# Patient Record
Sex: FEMALE | Race: WHITE | NOT HISPANIC OR LATINO | Employment: OTHER | URBAN - METROPOLITAN AREA
[De-identification: names, ages, dates, MRNs, and addresses within clinical notes are randomized per-mention and may not be internally consistent; named-entity substitution may affect disease eponyms.]

---

## 2023-01-01 ENCOUNTER — APPOINTMENT (OUTPATIENT)
Dept: CARDIOLOGY | Facility: HOSPITAL | Age: 88
DRG: 233 | End: 2023-01-01
Payer: MEDICARE

## 2023-01-01 ENCOUNTER — APPOINTMENT (OUTPATIENT)
Dept: GENERAL RADIOLOGY | Facility: HOSPITAL | Age: 88
DRG: 233 | End: 2023-01-01
Payer: MEDICARE

## 2023-01-01 ENCOUNTER — OFFICE VISIT (OUTPATIENT)
Dept: PERIOP | Facility: HOSPITAL | Age: 88
DRG: 233 | End: 2023-01-01

## 2023-01-01 ENCOUNTER — HOSPITAL ENCOUNTER (INPATIENT)
Facility: HOSPITAL | Age: 88
LOS: 4 days | DRG: 233 | End: 2023-07-15
Attending: EMERGENCY MEDICINE | Admitting: STUDENT IN AN ORGANIZED HEALTH CARE EDUCATION/TRAINING PROGRAM
Payer: MEDICARE

## 2023-01-01 ENCOUNTER — APPOINTMENT (OUTPATIENT)
Dept: GENERAL RADIOLOGY | Facility: HOSPITAL | Age: 88
DRG: 233 | End: 2023-01-01

## 2023-01-01 VITALS
HEART RATE: 70 BPM | RESPIRATION RATE: 23 BRPM | BODY MASS INDEX: 29.25 KG/M2 | SYSTOLIC BLOOD PRESSURE: 116 MMHG | DIASTOLIC BLOOD PRESSURE: 79 MMHG | TEMPERATURE: 97.2 F | WEIGHT: 149 LBS | OXYGEN SATURATION: 90 % | HEIGHT: 60 IN

## 2023-01-01 DIAGNOSIS — I20.0 UNSTABLE ANGINA: ICD-10-CM

## 2023-01-01 DIAGNOSIS — I21.4 NSTEMI (NON-ST ELEVATED MYOCARDIAL INFARCTION): Primary | ICD-10-CM

## 2023-01-01 DIAGNOSIS — Z95.820 STATUS POST ANGIOPLASTY WITH STENT: ICD-10-CM

## 2023-01-01 DIAGNOSIS — I25.110 CORONARY ARTERY DISEASE INVOLVING NATIVE CORONARY ARTERY OF NATIVE HEART WITH UNSTABLE ANGINA PECTORIS: ICD-10-CM

## 2023-01-01 DIAGNOSIS — J95.821 ACUTE POSTOPERATIVE RESPIRATORY FAILURE: ICD-10-CM

## 2023-01-01 LAB
ABO GROUP BLD: NORMAL
ACT BLD: 125 SECONDS (ref 89–137)
ACT BLD: 131 SECONDS (ref 89–137)
ACT BLD: 137 SECONDS (ref 89–137)
ACT BLD: 299 SECONDS (ref 89–137)
ACT BLD: 347 SECONDS (ref 89–137)
ACT BLD: 377 SECONDS (ref 89–137)
ALBUMIN SERPL-MCNC: 3.3 G/DL (ref 3.5–5.2)
ALBUMIN SERPL-MCNC: 3.4 G/DL (ref 3.5–5.2)
ALBUMIN SERPL-MCNC: 3.9 G/DL (ref 3.5–5.2)
ALBUMIN SERPL-MCNC: 4 G/DL (ref 3.5–5.2)
ALBUMIN SERPL-MCNC: 4 G/DL (ref 3.5–5.2)
ALBUMIN/GLOB SERPL: 1.5 G/DL
ALBUMIN/GLOB SERPL: 3.5 G/DL
ALP SERPL-CCNC: 66 U/L (ref 39–117)
ALP SERPL-CCNC: 75 U/L (ref 39–117)
ALT SERPL W P-5'-P-CCNC: 11 U/L (ref 1–33)
ALT SERPL W P-5'-P-CCNC: 49 U/L (ref 1–33)
ANION GAP SERPL CALCULATED.3IONS-SCNC: 11 MMOL/L (ref 5–15)
ANION GAP SERPL CALCULATED.3IONS-SCNC: 12 MMOL/L (ref 5–15)
ANION GAP SERPL CALCULATED.3IONS-SCNC: 14 MMOL/L (ref 5–15)
ANION GAP SERPL CALCULATED.3IONS-SCNC: 14 MMOL/L (ref 5–15)
ANION GAP SERPL CALCULATED.3IONS-SCNC: 16 MMOL/L (ref 5–15)
ANION GAP SERPL CALCULATED.3IONS-SCNC: 9 MMOL/L (ref 5–15)
ANISOCYTOSIS BLD QL: ABNORMAL
APTT PPP: 24.2 SECONDS (ref 61–76.5)
APTT PPP: 27.9 SECONDS (ref 61–76.5)
APTT PPP: 31.6 SECONDS (ref 61–76.5)
APTT PPP: 34.6 SECONDS (ref 24–31)
APTT PPP: 40 SECONDS (ref 24–31)
APTT PPP: 54.4 SECONDS (ref 61–76.5)
APTT PPP: 54.7 SECONDS (ref 61–76.5)
APTT PPP: 69.3 SECONDS (ref 61–76.5)
APTT PPP: 81.6 SECONDS (ref 61–76.5)
ARTERIAL PATENCY WRIST A: ABNORMAL
ARTERIAL PATENCY WRIST A: POSITIVE
AST SERPL-CCNC: 171 U/L (ref 1–32)
AST SERPL-CCNC: 23 U/L (ref 1–32)
ATMOSPHERIC PRESS: ABNORMAL MM[HG]
BACTERIA SPEC AEROBE CULT: NO GROWTH
BACTERIA UR QL AUTO: ABNORMAL /HPF
BASE DEFICIT: -3.8 MEQ/LITER
BASE DEFICIT: -5.6 MEQ/LITER
BASE DEFICIT: -5.7 MEQ/LITER
BASE DEFICIT: -6.7 MEQ/LITER
BASE DEFICIT: ABNORMAL
BASE EXCESS BLDA CALC-SCNC: -0.8 MMOL/L (ref 0–3)
BASE EXCESS BLDA CALC-SCNC: -10.4 MMOL/L (ref 0–3)
BASE EXCESS BLDA CALC-SCNC: -2.9 MMOL/L (ref 0–3)
BASE EXCESS BLDA CALC-SCNC: -3.5 MMOL/L (ref 0–3)
BASE EXCESS BLDA CALC-SCNC: -4.1 MMOL/L (ref 0–3)
BASE EXCESS BLDA CALC-SCNC: -4.7 MMOL/L (ref 0–3)
BASE EXCESS BLDA CALC-SCNC: -4.7 MMOL/L (ref 0–3)
BASE EXCESS BLDA CALC-SCNC: -5.1 MMOL/L (ref 0–3)
BASE EXCESS BLDA CALC-SCNC: -5.1 MMOL/L (ref 0–3)
BASE EXCESS BLDA CALC-SCNC: -5.2 MMOL/L (ref 0–3)
BASE EXCESS BLDA CALC-SCNC: -5.3 MMOL/L (ref 0–3)
BASE EXCESS BLDA CALC-SCNC: -5.7 MMOL/L (ref 0–3)
BASE EXCESS BLDA CALC-SCNC: -6.3 MMOL/L (ref 0–3)
BASE EXCESS BLDA CALC-SCNC: -6.8 MMOL/L (ref 0–3)
BASE EXCESS BLDA CALC-SCNC: -7.3 MMOL/L (ref 0–3)
BASE EXCESS BLDA CALC-SCNC: 2.1 MMOL/L (ref 0–3)
BASE EXCESS BLDA CALC-SCNC: <0 MMOL/L (ref 0–3)
BASOPHILS # BLD AUTO: 0 10*3/MM3 (ref 0–0.2)
BASOPHILS NFR BLD AUTO: 0 % (ref 0–1.5)
BASOPHILS NFR BLD AUTO: 0.1 % (ref 0–1.5)
BASOPHILS NFR BLD AUTO: 0.1 % (ref 0–1.5)
BASOPHILS NFR BLD AUTO: 0.2 % (ref 0–1.5)
BASOPHILS NFR BLD AUTO: 0.2 % (ref 0–1.5)
BDY SITE: ABNORMAL
BH BB BLOOD EXPIRATION DATE: NORMAL
BH BB BLOOD TYPE BARCODE: 5100
BH BB BLOOD TYPE BARCODE: 6200
BH BB BLOOD TYPE BARCODE: 8400
BH BB BLOOD TYPE BARCODE: 8400
BH BB DISPENSE STATUS: NORMAL
BH BB PRODUCT CODE: NORMAL
BH BB UNIT NUMBER: NORMAL
BH CV ECHO MEAS - ACS: 0.8 CM
BH CV ECHO MEAS - AO MAX PG: 25.8 MMHG
BH CV ECHO MEAS - AO MEAN PG: 16 MMHG
BH CV ECHO MEAS - AO V2 MAX: 254 CM/SEC
BH CV ECHO MEAS - AO V2 VTI: 59.4 CM
BH CV ECHO MEAS - AVA(I,D): 0.45 CM2
BH CV ECHO MEAS - EDV(CUBED): 103.8 ML
BH CV ECHO MEAS - EDV(MOD-SP4): 66.5 ML
BH CV ECHO MEAS - EDV(MOD-SP4): 72 ML
BH CV ECHO MEAS - EF(MOD-SP4): 22.2 %
BH CV ECHO MEAS - EF(MOD-SP4): 38.5 %
BH CV ECHO MEAS - ESV(CUBED): 35.9 ML
BH CV ECHO MEAS - ESV(MOD-SP4): 40.9 ML
BH CV ECHO MEAS - ESV(MOD-SP4): 56 ML
BH CV ECHO MEAS - FS: 29.8 %
BH CV ECHO MEAS - IVS/LVPW: 1.22 CM
BH CV ECHO MEAS - IVSD: 1.1 CM
BH CV ECHO MEAS - LA DIMENSION: 4.4 CM
BH CV ECHO MEAS - LAT PEAK E' VEL: 5.7 CM/SEC
BH CV ECHO MEAS - LV DIASTOLIC VOL/BSA (35-75): 43.5 CM2
BH CV ECHO MEAS - LV DIASTOLIC VOL/BSA (35-75): 43.7 CM2
BH CV ECHO MEAS - LV MASS(C)D: 164.5 GRAMS
BH CV ECHO MEAS - LV MAX PG: 1.61 MMHG
BH CV ECHO MEAS - LV MEAN PG: 1 MMHG
BH CV ECHO MEAS - LV SYSTOLIC VOL/BSA (12-30): 26.8 CM2
BH CV ECHO MEAS - LV SYSTOLIC VOL/BSA (12-30): 34 CM2
BH CV ECHO MEAS - LV V1 MAX: 63.4 CM/SEC
BH CV ECHO MEAS - LV V1 VTI: 12.5 CM
BH CV ECHO MEAS - LVIDD: 4.7 CM
BH CV ECHO MEAS - LVIDS: 3.3 CM
BH CV ECHO MEAS - LVOT AREA: 2.14 CM2
BH CV ECHO MEAS - LVOT DIAM: 1.65 CM
BH CV ECHO MEAS - LVPWD: 0.9 CM
BH CV ECHO MEAS - MED PEAK E' VEL: 7.4 CM/SEC
BH CV ECHO MEAS - MR MAX PG: 48.2 MMHG
BH CV ECHO MEAS - MR MAX VEL: 347 CM/SEC
BH CV ECHO MEAS - MV A MAX VEL: 124 CM/SEC
BH CV ECHO MEAS - MV DEC SLOPE: 606 CM/SEC2
BH CV ECHO MEAS - MV DEC TIME: 0.17 MSEC
BH CV ECHO MEAS - MV E MAX VEL: 68.7 CM/SEC
BH CV ECHO MEAS - MV E/A: 0.55
BH CV ECHO MEAS - MV MAX PG: 5.9 MMHG
BH CV ECHO MEAS - MV MEAN PG: 2 MMHG
BH CV ECHO MEAS - MV P1/2T: 49.3 MSEC
BH CV ECHO MEAS - MV V2 VTI: 24.5 CM
BH CV ECHO MEAS - MVA(P1/2T): 4.5 CM2
BH CV ECHO MEAS - MVA(VTI): 1.09 CM2
BH CV ECHO MEAS - PA V2 MAX: 150 CM/SEC
BH CV ECHO MEAS - QP/QS: 1.1
BH CV ECHO MEAS - RAP SYSTOLE: 3 MMHG
BH CV ECHO MEAS - RV MAX PG: 3.2 MMHG
BH CV ECHO MEAS - RV V1 MAX: 89.4 CM/SEC
BH CV ECHO MEAS - RV V1 VTI: 19.1 CM
BH CV ECHO MEAS - RVOT DIAM: 1.4 CM
BH CV ECHO MEAS - RVSP: 39.7 MMHG
BH CV ECHO MEAS - SI(MOD-SP4): 16.7 ML/M2
BH CV ECHO MEAS - SI(MOD-SP4): 9.7 ML/M2
BH CV ECHO MEAS - SV(LVOT): 26.7 ML
BH CV ECHO MEAS - SV(MOD-SP4): 16 ML
BH CV ECHO MEAS - SV(MOD-SP4): 25.6 ML
BH CV ECHO MEAS - SV(RVOT): 29.4 ML
BH CV ECHO MEAS - TAPSE (>1.6): 2.25 CM
BH CV ECHO MEAS - TR MAX PG: 36.7 MMHG
BH CV ECHO MEAS - TR MAX VEL: 303 CM/SEC
BH CV ECHO MEASUREMENTS AVERAGE E/E' RATIO: 10.49
BH CV XLRA MEAS - DIST GSV CALF DIST LEFT: 0.21 CM
BH CV XLRA MEAS - DIST GSV CALF DIST RIGHT: 0.17 CM
BH CV XLRA MEAS - DIST GSV THIGH DIST LEFT: 0.31 CM
BH CV XLRA MEAS - DIST GSV THIGH DIST RIGHT: 0.34 CM
BH CV XLRA MEAS - MID GSV CALF LEFT: 0.22 CM
BH CV XLRA MEAS - MID GSV CALF RIGHT: 0.17 CM
BH CV XLRA MEAS - MID GSV THIGH  LEFT: 0.34 CM
BH CV XLRA MEAS - MID GSV THIGH  RIGHT: 0.35 CM
BH CV XLRA MEAS - PROX GSV CALF DIST LEFT: 0.24 CM
BH CV XLRA MEAS - PROX GSV CALF DIST RIGHT: 0.27 CM
BH CV XLRA MEAS - PROX GSV THIGH  LEFT: 0.42 CM
BH CV XLRA MEAS - PROX GSV THIGH  RIGHT: 0.41 CM
BH CV XLRA MEAS LEFT DIST CCA EDV: 19.3 CM/SEC
BH CV XLRA MEAS LEFT DIST CCA PSV: 70.7 CM/SEC
BH CV XLRA MEAS LEFT DIST ICA EDV: -27.3 CM/SEC
BH CV XLRA MEAS LEFT DIST ICA PSV: -102 CM/SEC
BH CV XLRA MEAS LEFT ICA/CCA RATIO: -1.44
BH CV XLRA MEAS LEFT PROX CCA EDV: 10.8 CM/SEC
BH CV XLRA MEAS LEFT PROX CCA PSV: 67.8 CM/SEC
BH CV XLRA MEAS LEFT PROX ECA PSV: -65.9 CM/SEC
BH CV XLRA MEAS LEFT PROX ICA EDV: -19.3 CM/SEC
BH CV XLRA MEAS LEFT PROX ICA PSV: -63.2 CM/SEC
BH CV XLRA MEAS LEFT PROX SCLA PSV: 87.8 CM/SEC
BH CV XLRA MEAS LEFT VERTEBRAL A PSV: 39.6 CM/SEC
BH CV XLRA MEAS RIGHT DIST CCA EDV: 12.6 CM/SEC
BH CV XLRA MEAS RIGHT DIST CCA PSV: 75.2 CM/SEC
BH CV XLRA MEAS RIGHT DIST ICA EDV: -29.2 CM/SEC
BH CV XLRA MEAS RIGHT DIST ICA PSV: -95.1 CM/SEC
BH CV XLRA MEAS RIGHT ICA/CCA RATIO: -1.26
BH CV XLRA MEAS RIGHT PROX CCA EDV: 18.7 CM/SEC
BH CV XLRA MEAS RIGHT PROX CCA PSV: 56 CM/SEC
BH CV XLRA MEAS RIGHT PROX ECA PSV: -102 CM/SEC
BH CV XLRA MEAS RIGHT PROX ICA EDV: -16.7 CM/SEC
BH CV XLRA MEAS RIGHT PROX ICA PSV: -70.3 CM/SEC
BH CV XLRA MEAS RIGHT PROX SCLA PSV: 77.6 CM/SEC
BH CV XLRA MEAS RIGHT VERTEBRAL A PSV: -39.2 CM/SEC
BILIRUB SERPL-MCNC: 0.2 MG/DL (ref 0–1.2)
BILIRUB SERPL-MCNC: 0.8 MG/DL (ref 0–1.2)
BILIRUB UR QL STRIP: NEGATIVE
BLD GP AB SCN SERPL QL: NEGATIVE
BUN SERPL-MCNC: 15 MG/DL (ref 8–23)
BUN SERPL-MCNC: 17 MG/DL (ref 8–23)
BUN SERPL-MCNC: 17 MG/DL (ref 8–23)
BUN SERPL-MCNC: 18 MG/DL (ref 8–23)
BUN SERPL-MCNC: 19 MG/DL (ref 8–23)
BUN SERPL-MCNC: 20 MG/DL (ref 8–23)
BUN SERPL-MCNC: 32 MG/DL (ref 8–23)
BUN SERPL-MCNC: 36 MG/DL (ref 8–23)
BUN/CREAT SERPL: 19.8 (ref 7–25)
BUN/CREAT SERPL: 20.2 (ref 7–25)
BUN/CREAT SERPL: 21.7 (ref 7–25)
BUN/CREAT SERPL: 22.4 (ref 7–25)
BUN/CREAT SERPL: 22.4 (ref 7–25)
BUN/CREAT SERPL: 23.9 (ref 7–25)
BUN/CREAT SERPL: 24.1 (ref 7–25)
BUN/CREAT SERPL: 24.4 (ref 7–25)
BUN/CREAT SERPL: 26 (ref 7–25)
BUN/CREAT SERPL: 40 (ref 7–25)
BUN/CREAT SERPL: 41.9 (ref 7–25)
CA-I BLDA-SCNC: 1 MMOL/L (ref 1.12–1.32)
CA-I BLDA-SCNC: 1.01 MMOL/L (ref 1.12–1.32)
CA-I BLDA-SCNC: 1.05 MMOL/L (ref 1.12–1.32)
CA-I BLDA-SCNC: 1.06 MMOL/L (ref 1.12–1.32)
CA-I BLDA-SCNC: 1.16 MMOL/L (ref 1.12–1.32)
CA-I BLDA-SCNC: 1.24 MMOL/L (ref 1.15–1.33)
CA-I BLDA-SCNC: 1.25 MMOL/L (ref 1.15–1.33)
CA-I BLDA-SCNC: 1.26 MMOL/L (ref 1.15–1.33)
CA-I BLDA-SCNC: 1.29 MMOL/L (ref 1.15–1.33)
CA-I BLDA-SCNC: 1.32 MMOL/L (ref 1.15–1.33)
CA-I BLDA-SCNC: 1.32 MMOL/L (ref 1.15–1.33)
CA-I BLDA-SCNC: 1.36 MMOL/L (ref 1.15–1.33)
CA-I BLDA-SCNC: 1.41 MMOL/L (ref 1.15–1.33)
CA-I SERPL ISE-MCNC: 1.15 MMOL/L (ref 1.2–1.3)
CA-I SERPL ISE-MCNC: 1.22 MMOL/L (ref 1.2–1.3)
CA-I SERPL ISE-MCNC: 1.25 MMOL/L (ref 1.2–1.3)
CA-I SERPL ISE-MCNC: 1.26 MMOL/L (ref 1.2–1.3)
CA-I SERPL ISE-MCNC: 1.28 MMOL/L (ref 1.2–1.3)
CA-I SERPL ISE-MCNC: 1.31 MMOL/L (ref 1.2–1.3)
CALCIUM SPEC-SCNC: 8.3 MG/DL (ref 8.6–10.5)
CALCIUM SPEC-SCNC: 8.4 MG/DL (ref 8.6–10.5)
CALCIUM SPEC-SCNC: 8.5 MG/DL (ref 8.6–10.5)
CALCIUM SPEC-SCNC: 8.5 MG/DL (ref 8.6–10.5)
CALCIUM SPEC-SCNC: 8.7 MG/DL (ref 8.6–10.5)
CALCIUM SPEC-SCNC: 8.8 MG/DL (ref 8.6–10.5)
CALCIUM SPEC-SCNC: 9.1 MG/DL (ref 8.6–10.5)
CALCIUM SPEC-SCNC: 9.3 MG/DL (ref 8.6–10.5)
CHLORIDE SERPL-SCNC: 103 MMOL/L (ref 98–107)
CHLORIDE SERPL-SCNC: 106 MMOL/L (ref 98–107)
CHLORIDE SERPL-SCNC: 115 MMOL/L (ref 98–107)
CHLORIDE SERPL-SCNC: 115 MMOL/L (ref 98–107)
CHLORIDE SERPL-SCNC: 116 MMOL/L (ref 98–107)
CHLORIDE SERPL-SCNC: 117 MMOL/L (ref 98–107)
CHLORIDE SERPL-SCNC: 117 MMOL/L (ref 98–107)
CHOLEST SERPL-MCNC: 194 MG/DL (ref 0–200)
CLARITY UR: CLEAR
CLOSE TME COLL+ADP + EPINEP PNL BLD: 96 % (ref 86–100)
CLOSE TME COLL+ADP + EPINEP PNL BLD: 97 % (ref 86–100)
CO2 BLDA-SCNC: 17 MMOL/L (ref 23–27)
CO2 BLDA-SCNC: 19.3 MMOL/L (ref 22–29)
CO2 BLDA-SCNC: 19.6 MMOL/L (ref 22–29)
CO2 BLDA-SCNC: 19.7 MMOL/L (ref 22–29)
CO2 BLDA-SCNC: 20 MMOL/L (ref 23–27)
CO2 BLDA-SCNC: 20.3 MMOL/L (ref 22–29)
CO2 BLDA-SCNC: 20.8 MMOL/L (ref 22–29)
CO2 BLDA-SCNC: 21 MMOL/L (ref 23–27)
CO2 BLDA-SCNC: 21.1 MMOL/L (ref 22–29)
CO2 BLDA-SCNC: 21.1 MMOL/L (ref 22–29)
CO2 BLDA-SCNC: 21.2 MMOL/L (ref 22–29)
CO2 BLDA-SCNC: 21.3 MMOL/L (ref 22–29)
CO2 BLDA-SCNC: 22 MMOL/L (ref 23–27)
CO2 BLDA-SCNC: 22.2 MMOL/L (ref 22–29)
CO2 BLDA-SCNC: 23.3 MMOL/L (ref 22–29)
CO2 BLDA-SCNC: 26 MMOL/L (ref 23–27)
CO2 BLDA-SCNC: 26.1 MMOL/L (ref 22–29)
CO2 SERPL-SCNC: 17 MMOL/L (ref 22–29)
CO2 SERPL-SCNC: 18 MMOL/L (ref 22–29)
CO2 SERPL-SCNC: 19 MMOL/L (ref 22–29)
CO2 SERPL-SCNC: 19 MMOL/L (ref 22–29)
CO2 SERPL-SCNC: 21 MMOL/L (ref 22–29)
CO2 SERPL-SCNC: 22 MMOL/L (ref 22–29)
CO2 SERPL-SCNC: 22 MMOL/L (ref 22–29)
CO2 SERPL-SCNC: 23 MMOL/L (ref 22–29)
CO2 SERPL-SCNC: 25 MMOL/L (ref 22–29)
COLOR UR: YELLOW
CREAT SERPL-MCNC: 0.69 MG/DL (ref 0.57–1)
CREAT SERPL-MCNC: 0.71 MG/DL (ref 0.57–1)
CREAT SERPL-MCNC: 0.73 MG/DL (ref 0.57–1)
CREAT SERPL-MCNC: 0.76 MG/DL (ref 0.57–1)
CREAT SERPL-MCNC: 0.79 MG/DL (ref 0.57–1)
CREAT SERPL-MCNC: 0.8 MG/DL (ref 0.57–1)
CREAT SERPL-MCNC: 0.82 MG/DL (ref 0.57–1)
CREAT SERPL-MCNC: 0.85 MG/DL (ref 0.57–1)
CREAT SERPL-MCNC: 0.86 MG/DL (ref 0.57–1)
CREAT SERPL-MCNC: 0.91 MG/DL (ref 0.57–1)
CREAT SERPL-MCNC: 0.94 MG/DL (ref 0.57–1)
CROSSMATCH INTERPRETATION: NORMAL
D DIMER PPP FEU-MCNC: 0.77 MG/L (FEU) (ref 0–0.87)
D-LACTATE SERPL-SCNC: 1.8 MMOL/L (ref 0.5–2)
D-LACTATE SERPL-SCNC: 2.2 MMOL/L (ref 0.5–2)
D-LACTATE SERPL-SCNC: 2.4 MMOL/L (ref 0.5–2)
D-LACTATE SERPL-SCNC: 3 MMOL/L (ref 0.5–2)
D-LACTATE SERPL-SCNC: 3 MMOL/L (ref 0.5–2)
D-LACTATE SERPL-SCNC: 3.4 MMOL/L (ref 0.5–2)
D-LACTATE SERPL-SCNC: 3.6 MMOL/L (ref 0.2–2)
DEPRECATED RDW RBC AUTO: 42 FL (ref 37–54)
DEPRECATED RDW RBC AUTO: 42.9 FL (ref 37–54)
DEPRECATED RDW RBC AUTO: 43.8 FL (ref 37–54)
DEPRECATED RDW RBC AUTO: 44.6 FL (ref 37–54)
DEPRECATED RDW RBC AUTO: 44.6 FL (ref 37–54)
DEPRECATED RDW RBC AUTO: 45.1 FL (ref 37–54)
DEPRECATED RDW RBC AUTO: 45.9 FL (ref 37–54)
DEPRECATED RDW RBC AUTO: 46.8 FL (ref 37–54)
DEPRECATED RDW RBC AUTO: 47.3 FL (ref 37–54)
DEPRECATED RDW RBC AUTO: 47.7 FL (ref 37–54)
DEPRECATED RDW RBC AUTO: 48.6 FL (ref 37–54)
DEPRECATED RDW RBC AUTO: 48.6 FL (ref 37–54)
DEPRECATED RDW RBC AUTO: 49 FL (ref 37–54)
DEPRECATED RDW RBC AUTO: 55.1 FL (ref 37–54)
EGFRCR SERPLBLD CKD-EPI 2021: 58.8 ML/MIN/1.73
EGFRCR SERPLBLD CKD-EPI 2021: 61.2 ML/MIN/1.73
EGFRCR SERPLBLD CKD-EPI 2021: 65.5 ML/MIN/1.73
EGFRCR SERPLBLD CKD-EPI 2021: 66.4 ML/MIN/1.73
EGFRCR SERPLBLD CKD-EPI 2021: 69.3 ML/MIN/1.73
EGFRCR SERPLBLD CKD-EPI 2021: 71.4 ML/MIN/1.73
EGFRCR SERPLBLD CKD-EPI 2021: 72.5 ML/MIN/1.73
EGFRCR SERPLBLD CKD-EPI 2021: 75.9 ML/MIN/1.73
EGFRCR SERPLBLD CKD-EPI 2021: 79.7 ML/MIN/1.73
EGFRCR SERPLBLD CKD-EPI 2021: 82.4 ML/MIN/1.73
EGFRCR SERPLBLD CKD-EPI 2021: 84.1 ML/MIN/1.73
EOSINOPHIL # BLD AUTO: 0 10*3/MM3 (ref 0–0.4)
EOSINOPHIL # BLD AUTO: 0.1 10*3/MM3 (ref 0–0.4)
EOSINOPHIL # BLD AUTO: 0.1 10*3/MM3 (ref 0–0.4)
EOSINOPHIL NFR BLD AUTO: 0 % (ref 0.3–6.2)
EOSINOPHIL NFR BLD AUTO: 0.7 % (ref 0.3–6.2)
EOSINOPHIL NFR BLD AUTO: 1.1 % (ref 0.3–6.2)
ERYTHROCYTE [DISTWIDTH] IN BLOOD BY AUTOMATED COUNT: 13.1 % (ref 12.3–15.4)
ERYTHROCYTE [DISTWIDTH] IN BLOOD BY AUTOMATED COUNT: 13.1 % (ref 12.3–15.4)
ERYTHROCYTE [DISTWIDTH] IN BLOOD BY AUTOMATED COUNT: 13.2 % (ref 12.3–15.4)
ERYTHROCYTE [DISTWIDTH] IN BLOOD BY AUTOMATED COUNT: 13.2 % (ref 12.3–15.4)
ERYTHROCYTE [DISTWIDTH] IN BLOOD BY AUTOMATED COUNT: 13.4 % (ref 12.3–15.4)
ERYTHROCYTE [DISTWIDTH] IN BLOOD BY AUTOMATED COUNT: 14 % (ref 12.3–15.4)
ERYTHROCYTE [DISTWIDTH] IN BLOOD BY AUTOMATED COUNT: 14.2 % (ref 12.3–15.4)
ERYTHROCYTE [DISTWIDTH] IN BLOOD BY AUTOMATED COUNT: 14.4 % (ref 12.3–15.4)
ERYTHROCYTE [DISTWIDTH] IN BLOOD BY AUTOMATED COUNT: 14.6 % (ref 12.3–15.4)
ERYTHROCYTE [DISTWIDTH] IN BLOOD BY AUTOMATED COUNT: 14.9 % (ref 12.3–15.4)
ERYTHROCYTE [DISTWIDTH] IN BLOOD BY AUTOMATED COUNT: 14.9 % (ref 12.3–15.4)
ERYTHROCYTE [DISTWIDTH] IN BLOOD BY AUTOMATED COUNT: 15 % (ref 12.3–15.4)
ERYTHROCYTE [DISTWIDTH] IN BLOOD BY AUTOMATED COUNT: 15.2 % (ref 12.3–15.4)
ERYTHROCYTE [DISTWIDTH] IN BLOOD BY AUTOMATED COUNT: 15.9 % (ref 12.3–15.4)
FIBRINOGEN PPP-MCNC: 167 MG/DL (ref 210–450)
FIBRINOGEN PPP-MCNC: 188 MG/DL (ref 210–450)
GEN 5 2HR TROPONIN T REFLEX: 285 NG/L
GLOBULIN UR ELPH-MCNC: 1.1 GM/DL
GLOBULIN UR ELPH-MCNC: 2.3 GM/DL
GLUCOSE BLDC GLUCOMTR-MCNC: 107 MG/DL (ref 74–100)
GLUCOSE BLDC GLUCOMTR-MCNC: 117 MG/DL (ref 70–105)
GLUCOSE BLDC GLUCOMTR-MCNC: 121 MG/DL (ref 70–105)
GLUCOSE BLDC GLUCOMTR-MCNC: 124 MG/DL (ref 74–100)
GLUCOSE BLDC GLUCOMTR-MCNC: 124 MG/DL (ref 74–100)
GLUCOSE BLDC GLUCOMTR-MCNC: 125 MG/DL (ref 74–100)
GLUCOSE BLDC GLUCOMTR-MCNC: 125 MG/DL (ref 74–100)
GLUCOSE BLDC GLUCOMTR-MCNC: 128 MG/DL (ref 70–105)
GLUCOSE BLDC GLUCOMTR-MCNC: 128 MG/DL (ref 74–100)
GLUCOSE BLDC GLUCOMTR-MCNC: 128 MG/DL (ref 74–100)
GLUCOSE BLDC GLUCOMTR-MCNC: 129 MG/DL (ref 70–105)
GLUCOSE BLDC GLUCOMTR-MCNC: 135 MG/DL (ref 70–105)
GLUCOSE BLDC GLUCOMTR-MCNC: 137 MG/DL (ref 74–100)
GLUCOSE BLDC GLUCOMTR-MCNC: 137 MG/DL (ref 74–100)
GLUCOSE BLDC GLUCOMTR-MCNC: 143 MG/DL (ref 74–100)
GLUCOSE BLDC GLUCOMTR-MCNC: 143 MG/DL (ref 74–100)
GLUCOSE BLDC GLUCOMTR-MCNC: 146 MG/DL (ref 70–105)
GLUCOSE BLDC GLUCOMTR-MCNC: 149 MG/DL (ref 70–105)
GLUCOSE BLDC GLUCOMTR-MCNC: 150 MG/DL (ref 74–100)
GLUCOSE BLDC GLUCOMTR-MCNC: 150 MG/DL (ref 74–100)
GLUCOSE BLDC GLUCOMTR-MCNC: 157 MG/DL (ref 70–105)
GLUCOSE BLDC GLUCOMTR-MCNC: 158 MG/DL (ref 74–100)
GLUCOSE BLDC GLUCOMTR-MCNC: 159 MG/DL (ref 70–105)
GLUCOSE BLDC GLUCOMTR-MCNC: 160 MG/DL (ref 70–105)
GLUCOSE BLDC GLUCOMTR-MCNC: 160 MG/DL (ref 70–105)
GLUCOSE BLDC GLUCOMTR-MCNC: 164 MG/DL (ref 70–105)
GLUCOSE BLDC GLUCOMTR-MCNC: 167 MG/DL (ref 74–100)
GLUCOSE BLDC GLUCOMTR-MCNC: 167 MG/DL (ref 74–100)
GLUCOSE BLDC GLUCOMTR-MCNC: 175 MG/DL (ref 70–105)
GLUCOSE BLDC GLUCOMTR-MCNC: 185 MG/DL (ref 70–105)
GLUCOSE BLDC GLUCOMTR-MCNC: 214 MG/DL (ref 70–105)
GLUCOSE BLDC GLUCOMTR-MCNC: 99 MG/DL (ref 70–105)
GLUCOSE SERPL-MCNC: 118 MG/DL (ref 65–99)
GLUCOSE SERPL-MCNC: 133 MG/DL (ref 65–99)
GLUCOSE SERPL-MCNC: 135 MG/DL (ref 65–99)
GLUCOSE SERPL-MCNC: 137 MG/DL (ref 65–99)
GLUCOSE SERPL-MCNC: 144 MG/DL (ref 65–99)
GLUCOSE SERPL-MCNC: 152 MG/DL (ref 65–99)
GLUCOSE SERPL-MCNC: 158 MG/DL (ref 65–99)
GLUCOSE SERPL-MCNC: 167 MG/DL (ref 65–99)
GLUCOSE SERPL-MCNC: 169 MG/DL (ref 65–99)
GLUCOSE SERPL-MCNC: 170 MG/DL (ref 65–99)
GLUCOSE SERPL-MCNC: 230 MG/DL (ref 65–99)
GLUCOSE UR STRIP-MCNC: NEGATIVE MG/DL
HBA1C MFR BLD: 5.8 % (ref 4.8–5.6)
HCO3 BLDA-SCNC: 15.8 MMOL/L (ref 22–26)
HCO3 BLDA-SCNC: 18.2 MMOL/L (ref 21–28)
HCO3 BLDA-SCNC: 18.3 MMOL/L (ref 21–28)
HCO3 BLDA-SCNC: 18.6 MMOL/L (ref 21–28)
HCO3 BLDA-SCNC: 18.6 MMOL/L (ref 22–26)
HCO3 BLDA-SCNC: 18.7 MMOL/L (ref 21–28)
HCO3 BLDA-SCNC: 18.7 MMOL/L (ref 21–28)
HCO3 BLDA-SCNC: 19.3 MMOL/L (ref 21–28)
HCO3 BLDA-SCNC: 19.5 MMOL/L (ref 22–26)
HCO3 BLDA-SCNC: 19.7 MMOL/L (ref 21–28)
HCO3 BLDA-SCNC: 20.1 MMOL/L (ref 21–28)
HCO3 BLDA-SCNC: 20.2 MMOL/L (ref 21–28)
HCO3 BLDA-SCNC: 20.2 MMOL/L (ref 21–28)
HCO3 BLDA-SCNC: 20.5 MMOL/L (ref 22–26)
HCO3 BLDA-SCNC: 21.4 MMOL/L (ref 21–28)
HCO3 BLDA-SCNC: 24.6 MMOL/L (ref 22–26)
HCO3 BLDA-SCNC: 25 MMOL/L (ref 21–28)
HCO3 MIXED: 19.8 MMOL/L (ref 21–29)
HCO3 MIXED: 20.1 MMOL/L (ref 21–29)
HCO3 MIXED: 20.1 MMOL/L (ref 21–29)
HCO3 MIXED: 22 MMOL/L (ref 21–29)
HCT VFR BLD AUTO: 17.1 % (ref 34–46.6)
HCT VFR BLD AUTO: 24.3 % (ref 34–46.6)
HCT VFR BLD AUTO: 27.1 % (ref 34–46.6)
HCT VFR BLD AUTO: 31.2 % (ref 34–46.6)
HCT VFR BLD AUTO: 31.9 % (ref 34–46.6)
HCT VFR BLD AUTO: 32.1 % (ref 34–46.6)
HCT VFR BLD AUTO: 32.7 % (ref 34–46.6)
HCT VFR BLD AUTO: 34.7 % (ref 34–46.6)
HCT VFR BLD AUTO: 36.1 % (ref 34–46.6)
HCT VFR BLD AUTO: 37.8 % (ref 34–46.6)
HCT VFR BLD AUTO: 38.8 % (ref 34–46.6)
HCT VFR BLD AUTO: 39.7 % (ref 34–46.6)
HCT VFR BLD AUTO: 40.2 % (ref 34–46.6)
HCT VFR BLD AUTO: 40.9 % (ref 34–46.6)
HCT VFR BLDA CALC: 17 % (ref 38–51)
HCT VFR BLDA CALC: 24 % (ref 38–51)
HCT VFR BLDA CALC: 24 % (ref 38–51)
HCT VFR BLDA CALC: 26 % (ref 38–51)
HCT VFR BLDA CALC: 27 % (ref 38–51)
HCT VFR BLDA CALC: 29 % (ref 38–51)
HCT VFR BLDA CALC: 30 % (ref 38–51)
HCT VFR BLDA CALC: 31 % (ref 38–51)
HCT VFR BLDA CALC: 34 % (ref 38–51)
HDLC SERPL-MCNC: 54 MG/DL (ref 40–60)
HEMODILUTION: NO
HGB BLD-MCNC: 10.2 G/DL (ref 12–15.9)
HGB BLD-MCNC: 10.2 G/DL (ref 12–15.9)
HGB BLD-MCNC: 10.6 G/DL (ref 12–15.9)
HGB BLD-MCNC: 10.8 G/DL (ref 12–15.9)
HGB BLD-MCNC: 11.2 G/DL (ref 12–15.9)
HGB BLD-MCNC: 11.7 G/DL (ref 12–15.9)
HGB BLD-MCNC: 12.3 G/DL (ref 12–15.9)
HGB BLD-MCNC: 12.8 G/DL (ref 12–15.9)
HGB BLD-MCNC: 13.1 G/DL (ref 12–15.9)
HGB BLD-MCNC: 13.2 G/DL (ref 12–15.9)
HGB BLD-MCNC: 13.5 G/DL (ref 12–15.9)
HGB BLD-MCNC: 5.2 G/DL (ref 12–15.9)
HGB BLD-MCNC: 7.8 G/DL (ref 12–15.9)
HGB BLD-MCNC: 8.6 G/DL (ref 12–15.9)
HGB BLDA-MCNC: 10.1 G/DL (ref 12–17)
HGB BLDA-MCNC: 10.1 G/DL (ref 12–17)
HGB BLDA-MCNC: 10.2 G/DL (ref 12–17)
HGB BLDA-MCNC: 10.2 G/DL (ref 12–17)
HGB BLDA-MCNC: 10.3 G/DL (ref 12–17)
HGB BLDA-MCNC: 10.5 G/DL (ref 12–17)
HGB BLDA-MCNC: 11.5 G/DL (ref 12–17)
HGB BLDA-MCNC: 5.9 G/DL (ref 12–17)
HGB BLDA-MCNC: 8.2 G/DL (ref 12–17)
HGB BLDA-MCNC: 8.2 G/DL (ref 12–17)
HGB BLDA-MCNC: 8.8 G/DL (ref 12–17)
HGB BLDA-MCNC: 9.2 G/DL (ref 12–17)
HGB BLDA-MCNC: 9.9 G/DL (ref 12–17)
HGB UR QL STRIP.AUTO: NEGATIVE
HYALINE CASTS UR QL AUTO: ABNORMAL /LPF
INHALED O2 CONCENTRATION: 21 %
INHALED O2 CONCENTRATION: 32 %
INHALED O2 CONCENTRATION: 32 %
INHALED O2 CONCENTRATION: 40 %
INHALED O2 CONCENTRATION: 70 %
INR PPP: 0.97 (ref 0.93–1.1)
INR PPP: 1 (ref 0.93–1.1)
INR PPP: 1.19 (ref 0.93–1.1)
INR PPP: 1.23 (ref 0.93–1.1)
INR PPP: 1.4 (ref 0.93–1.1)
KETONES UR QL STRIP: NEGATIVE
LDLC SERPL CALC-MCNC: 123 MG/DL (ref 0–100)
LDLC/HDLC SERPL: 2.24 {RATIO}
LEFT ATRIUM VOLUME INDEX: 24.9 ML/M2
LEUKOCYTE ESTERASE UR QL STRIP.AUTO: ABNORMAL
LYMPHOCYTES # BLD AUTO: 0.8 10*3/MM3 (ref 0.7–3.1)
LYMPHOCYTES # BLD AUTO: 1.1 10*3/MM3 (ref 0.7–3.1)
LYMPHOCYTES # BLD AUTO: 1.2 10*3/MM3 (ref 0.7–3.1)
LYMPHOCYTES # BLD AUTO: 1.3 10*3/MM3 (ref 0.7–3.1)
LYMPHOCYTES # BLD AUTO: 1.3 10*3/MM3 (ref 0.7–3.1)
LYMPHOCYTES # BLD MANUAL: 1.2 10*3/MM3 (ref 0.7–3.1)
LYMPHOCYTES NFR BLD AUTO: 13.4 % (ref 19.6–45.3)
LYMPHOCYTES NFR BLD AUTO: 5.6 % (ref 19.6–45.3)
LYMPHOCYTES NFR BLD AUTO: 6 % (ref 19.6–45.3)
LYMPHOCYTES NFR BLD AUTO: 6.1 % (ref 19.6–45.3)
LYMPHOCYTES NFR BLD AUTO: 6.3 % (ref 19.6–45.3)
LYMPHOCYTES NFR BLD MANUAL: 3 % (ref 5–12)
MAGNESIUM SERPL-MCNC: 1.4 MG/DL (ref 1.6–2.4)
MAGNESIUM SERPL-MCNC: 1.8 MG/DL (ref 1.6–2.4)
MAGNESIUM SERPL-MCNC: 1.8 MG/DL (ref 1.6–2.4)
MAGNESIUM SERPL-MCNC: 1.9 MG/DL (ref 1.6–2.4)
MAGNESIUM SERPL-MCNC: 2 MG/DL (ref 1.6–2.4)
MAGNESIUM SERPL-MCNC: 2.2 MG/DL (ref 1.6–2.4)
MAGNESIUM SERPL-MCNC: 2.3 MG/DL (ref 1.6–2.4)
MAGNESIUM SERPL-MCNC: 2.3 MG/DL (ref 1.6–2.4)
MAGNESIUM SERPL-MCNC: 3 MG/DL (ref 1.6–2.4)
MCH RBC QN AUTO: 28.4 PG (ref 26.6–33)
MCH RBC QN AUTO: 28.5 PG (ref 26.6–33)
MCH RBC QN AUTO: 28.5 PG (ref 26.6–33)
MCH RBC QN AUTO: 28.9 PG (ref 26.6–33)
MCH RBC QN AUTO: 29.1 PG (ref 26.6–33)
MCH RBC QN AUTO: 29.1 PG (ref 26.6–33)
MCH RBC QN AUTO: 29.2 PG (ref 26.6–33)
MCH RBC QN AUTO: 29.2 PG (ref 26.6–33)
MCH RBC QN AUTO: 29.5 PG (ref 26.6–33)
MCH RBC QN AUTO: 29.8 PG (ref 26.6–33)
MCH RBC QN AUTO: 29.8 PG (ref 26.6–33)
MCH RBC QN AUTO: 30 PG (ref 26.6–33)
MCH RBC QN AUTO: 30.1 PG (ref 26.6–33)
MCH RBC QN AUTO: 30.1 PG (ref 26.6–33)
MCHC RBC AUTO-ENTMCNC: 30.6 G/DL (ref 31.5–35.7)
MCHC RBC AUTO-ENTMCNC: 31.5 G/DL (ref 31.5–35.7)
MCHC RBC AUTO-ENTMCNC: 32 G/DL (ref 31.5–35.7)
MCHC RBC AUTO-ENTMCNC: 32.1 G/DL (ref 31.5–35.7)
MCHC RBC AUTO-ENTMCNC: 32.3 G/DL (ref 31.5–35.7)
MCHC RBC AUTO-ENTMCNC: 32.4 G/DL (ref 31.5–35.7)
MCHC RBC AUTO-ENTMCNC: 32.5 G/DL (ref 31.5–35.7)
MCHC RBC AUTO-ENTMCNC: 32.6 G/DL (ref 31.5–35.7)
MCHC RBC AUTO-ENTMCNC: 32.8 G/DL (ref 31.5–35.7)
MCHC RBC AUTO-ENTMCNC: 32.9 G/DL (ref 31.5–35.7)
MCHC RBC AUTO-ENTMCNC: 33 G/DL (ref 31.5–35.7)
MCHC RBC AUTO-ENTMCNC: 33 G/DL (ref 31.5–35.7)
MCHC RBC AUTO-ENTMCNC: 33.1 G/DL (ref 31.5–35.7)
MCHC RBC AUTO-ENTMCNC: 33.2 G/DL (ref 31.5–35.7)
MCV RBC AUTO: 88.5 FL (ref 79–97)
MCV RBC AUTO: 89.1 FL (ref 79–97)
MCV RBC AUTO: 89.9 FL (ref 79–97)
MCV RBC AUTO: 90.1 FL (ref 79–97)
MCV RBC AUTO: 90.2 FL (ref 79–97)
MCV RBC AUTO: 90.3 FL (ref 79–97)
MCV RBC AUTO: 90.7 FL (ref 79–97)
MCV RBC AUTO: 90.9 FL (ref 79–97)
MCV RBC AUTO: 91.2 FL (ref 79–97)
MCV RBC AUTO: 91.8 FL (ref 79–97)
MCV RBC AUTO: 91.9 FL (ref 79–97)
MCV RBC AUTO: 93.1 FL (ref 79–97)
MODALITY: ABNORMAL
MONOCYTES # BLD AUTO: 0.6 10*3/MM3 (ref 0.1–0.9)
MONOCYTES # BLD AUTO: 0.7 10*3/MM3 (ref 0.1–0.9)
MONOCYTES # BLD AUTO: 1.1 10*3/MM3 (ref 0.1–0.9)
MONOCYTES # BLD AUTO: 1.5 10*3/MM3 (ref 0.1–0.9)
MONOCYTES # BLD AUTO: 1.7 10*3/MM3 (ref 0.1–0.9)
MONOCYTES # BLD: 0.3 10*3/MM3 (ref 0.1–0.9)
MONOCYTES NFR BLD AUTO: 4.7 % (ref 5–12)
MONOCYTES NFR BLD AUTO: 6 % (ref 5–12)
MONOCYTES NFR BLD AUTO: 7 % (ref 5–12)
MONOCYTES NFR BLD AUTO: 7.2 % (ref 5–12)
MONOCYTES NFR BLD AUTO: 8 % (ref 5–12)
MRSA DNA SPEC QL NAA+PROBE: NORMAL
NEUTROPHILS # BLD AUTO: 8.5 10*3/MM3 (ref 1.7–7)
NEUTROPHILS NFR BLD AUTO: 11.7 10*3/MM3 (ref 1.7–7)
NEUTROPHILS NFR BLD AUTO: 16.2 10*3/MM3 (ref 1.7–7)
NEUTROPHILS NFR BLD AUTO: 18.2 10*3/MM3 (ref 1.7–7)
NEUTROPHILS NFR BLD AUTO: 18.6 10*3/MM3 (ref 1.7–7)
NEUTROPHILS NFR BLD AUTO: 7.4 10*3/MM3 (ref 1.7–7)
NEUTROPHILS NFR BLD AUTO: 78.3 % (ref 42.7–76)
NEUTROPHILS NFR BLD AUTO: 85.8 % (ref 42.7–76)
NEUTROPHILS NFR BLD AUTO: 87.1 % (ref 42.7–76)
NEUTROPHILS NFR BLD AUTO: 88 % (ref 42.7–76)
NEUTROPHILS NFR BLD AUTO: 88.1 % (ref 42.7–76)
NEUTROPHILS NFR BLD MANUAL: 79 % (ref 42.7–76)
NEUTS BAND NFR BLD MANUAL: 6 % (ref 0–5)
NITRITE UR QL STRIP: NEGATIVE
NRBC BLD AUTO-RTO: 0 /100 WBC (ref 0–0.2)
NRBC BLD AUTO-RTO: 0.1 /100 WBC (ref 0–0.2)
NRBC BLD AUTO-RTO: 0.2 /100 WBC (ref 0–0.2)
NRBC SPEC MANUAL: 3 /100 WBC (ref 0–0.2)
NT-PROBNP SERPL-MCNC: ABNORMAL PG/ML (ref 0–1800)
O2 SATURATION MIXED: 67.5 %
O2 SATURATION MIXED: 74.6 %
O2 SATURATION MIXED: 75.8 %
O2 SATURATION MIXED: 79.2 %
OSMOLALITY UR: 493 MOSM/KG (ref 300–800)
PCO2 BLDA: 26.7 MM HG (ref 35–48)
PCO2 BLDA: 28.9 MM HG (ref 35–48)
PCO2 BLDA: 29.8 MM HG (ref 35–48)
PCO2 BLDA: 31.2 MM HG (ref 35–48)
PCO2 BLDA: 31.9 MM HG (ref 35–48)
PCO2 BLDA: 32.7 MM HG (ref 35–48)
PCO2 BLDA: 33.1 MM HG (ref 35–48)
PCO2 BLDA: 34.8 MM HG (ref 35–48)
PCO2 BLDA: 35.3 MM HG (ref 35–48)
PCO2 BLDA: 35.7 MM HG (ref 35–48)
PCO2 BLDA: 35.8 MM HG (ref 35–48)
PCO2 BLDA: 37.2 MM HG (ref 35–45)
PCO2 BLDA: 41.8 MM HG (ref 35–45)
PCO2 BLDA: 44.5 MM HG (ref 35–45)
PCO2 BLDA: 45.2 MM HG (ref 35–45)
PCO2 BLDA: 46.6 MM HG (ref 35–45)
PCO2 BLDA: 62.7 MM HG (ref 35–48)
PCO2 MIXED: 37.1 MMHG (ref 35–51)
PCO2 MIXED: 37.2 MMHG (ref 35–51)
PCO2 MIXED: 40.8 MMHG (ref 35–51)
PCO2 MIXED: 41.1 MMHG (ref 35–51)
PEEP RESPIRATORY: 5 CM[H2O]
PEEP RESPIRATORY: 8 CM[H2O]
PH BLDA: 7.08 PH UNITS (ref 7.35–7.45)
PH BLDA: 7.24 PH UNITS (ref 7.35–7.45)
PH BLDA: 7.24 PH UNITS (ref 7.35–7.45)
PH BLDA: 7.25 PH UNITS (ref 7.35–7.45)
PH BLDA: 7.26 PH UNITS (ref 7.35–7.45)
PH BLDA: 7.31 PH UNITS (ref 7.35–7.45)
PH BLDA: 7.33 PH UNITS (ref 7.35–7.45)
PH BLDA: 7.35 PH UNITS (ref 7.35–7.45)
PH BLDA: 7.35 PH UNITS (ref 7.35–7.45)
PH BLDA: 7.36 PH UNITS (ref 7.35–7.45)
PH BLDA: 7.38 PH UNITS (ref 7.35–7.45)
PH BLDA: 7.39 PH UNITS (ref 7.35–7.45)
PH BLDA: 7.4 PH UNITS (ref 7.35–7.45)
PH BLDA: 7.41 PH UNITS (ref 7.35–7.45)
PH BLDA: 7.45 PH UNITS (ref 7.35–7.45)
PH BLDA: 7.49 PH UNITS (ref 7.35–7.45)
PH BLDA: 7.51 PH UNITS (ref 7.35–7.45)
PH MIXED: 7.29 PH UNITS (ref 7.32–7.45)
PH MIXED: 7.34 PH UNITS (ref 7.32–7.45)
PH UR STRIP.AUTO: 5.5 [PH] (ref 5–8)
PHOSPHATE SERPL-MCNC: 2.9 MG/DL (ref 2.5–4.5)
PHOSPHATE SERPL-MCNC: 3.2 MG/DL (ref 2.5–4.5)
PHOSPHATE SERPL-MCNC: 3.3 MG/DL (ref 2.5–4.5)
PHOSPHATE SERPL-MCNC: 3.4 MG/DL (ref 2.5–4.5)
PLATELET # BLD AUTO: 128 10*3/MM3 (ref 140–450)
PLATELET # BLD AUTO: 138 10*3/MM3 (ref 140–450)
PLATELET # BLD AUTO: 138 10*3/MM3 (ref 140–450)
PLATELET # BLD AUTO: 148 10*3/MM3 (ref 140–450)
PLATELET # BLD AUTO: 163 10*3/MM3 (ref 140–450)
PLATELET # BLD AUTO: 177 10*3/MM3 (ref 140–450)
PLATELET # BLD AUTO: 181 10*3/MM3 (ref 140–450)
PLATELET # BLD AUTO: 187 10*3/MM3 (ref 140–450)
PLATELET # BLD AUTO: 187 10*3/MM3 (ref 140–450)
PLATELET # BLD AUTO: 195 10*3/MM3 (ref 140–450)
PLATELET # BLD AUTO: 201 10*3/MM3 (ref 140–450)
PLATELET # BLD AUTO: 215 10*3/MM3 (ref 140–450)
PLATELET # BLD AUTO: 63 10*3/MM3 (ref 140–450)
PLATELET # BLD AUTO: 67 10*3/MM3 (ref 140–450)
PMV BLD AUTO: 7.6 FL (ref 6–12)
PMV BLD AUTO: 7.7 FL (ref 6–12)
PMV BLD AUTO: 7.7 FL (ref 6–12)
PMV BLD AUTO: 8 FL (ref 6–12)
PMV BLD AUTO: 8 FL (ref 6–12)
PMV BLD AUTO: 8.1 FL (ref 6–12)
PMV BLD AUTO: 8.2 FL (ref 6–12)
PMV BLD AUTO: 8.2 FL (ref 6–12)
PMV BLD AUTO: 8.5 FL (ref 6–12)
PMV BLD AUTO: 8.5 FL (ref 6–12)
PMV BLD AUTO: 8.7 FL (ref 6–12)
PMV BLD AUTO: 9.1 FL (ref 6–12)
PMV BLD AUTO: 9.2 FL (ref 6–12)
PMV BLD AUTO: 9.3 FL (ref 6–12)
PO2 BLDA: 101 MM HG (ref 83–108)
PO2 BLDA: 116 MM HG (ref 83–108)
PO2 BLDA: 118.6 MM HG (ref 83–108)
PO2 BLDA: 118.6 MM HG (ref 83–108)
PO2 BLDA: 120.7 MM HG (ref 83–108)
PO2 BLDA: 122.1 MM HG (ref 83–108)
PO2 BLDA: 123.7 MM HG (ref 83–108)
PO2 BLDA: 125.9 MM HG (ref 83–108)
PO2 BLDA: 130.2 MM HG (ref 83–108)
PO2 BLDA: 132.2 MM HG (ref 83–108)
PO2 BLDA: 262 MM HG (ref 80–105)
PO2 BLDA: 272.5 MM HG (ref 83–108)
PO2 BLDA: 295 MM HG (ref 80–105)
PO2 BLDA: 327 MM HG (ref 80–105)
PO2 BLDA: 334 MM HG (ref 80–105)
PO2 BLDA: 529 MM HG (ref 80–105)
PO2 BLDA: 85.4 MM HG (ref 83–108)
PO2 MIXED: 36.9 MMHG
PO2 MIXED: 41.7 MMHG
PO2 MIXED: 43.1 MMHG
PO2 MIXED: 48.3 MMHG
POTASSIUM BLDA-SCNC: 3 MMOL/L (ref 3.5–4.9)
POTASSIUM BLDA-SCNC: 3.1 MMOL/L (ref 3.5–4.5)
POTASSIUM BLDA-SCNC: 3.2 MMOL/L (ref 3.5–4.9)
POTASSIUM BLDA-SCNC: 3.4 MMOL/L (ref 3.5–4.9)
POTASSIUM BLDA-SCNC: 3.9 MMOL/L (ref 3.5–4.5)
POTASSIUM BLDA-SCNC: 3.9 MMOL/L (ref 3.5–4.9)
POTASSIUM BLDA-SCNC: 3.9 MMOL/L (ref 3.5–4.9)
POTASSIUM BLDA-SCNC: 4 MMOL/L (ref 3.5–4.5)
POTASSIUM BLDA-SCNC: 4 MMOL/L (ref 3.5–4.5)
POTASSIUM BLDA-SCNC: 4.1 MMOL/L (ref 3.5–4.5)
POTASSIUM BLDA-SCNC: 4.1 MMOL/L (ref 3.5–4.5)
POTASSIUM BLDA-SCNC: 4.5 MMOL/L (ref 3.5–4.5)
POTASSIUM BLDA-SCNC: 4.6 MMOL/L (ref 3.5–4.5)
POTASSIUM SERPL-SCNC: 3.2 MMOL/L (ref 3.5–5.2)
POTASSIUM SERPL-SCNC: 3.2 MMOL/L (ref 3.5–5.2)
POTASSIUM SERPL-SCNC: 3.6 MMOL/L (ref 3.5–5.2)
POTASSIUM SERPL-SCNC: 3.9 MMOL/L (ref 3.5–5.2)
POTASSIUM SERPL-SCNC: 4 MMOL/L (ref 3.5–5.2)
POTASSIUM SERPL-SCNC: 4 MMOL/L (ref 3.5–5.2)
POTASSIUM SERPL-SCNC: 4.2 MMOL/L (ref 3.5–5.2)
POTASSIUM SERPL-SCNC: 4.2 MMOL/L (ref 3.5–5.2)
POTASSIUM SERPL-SCNC: 4.3 MMOL/L (ref 3.5–5.2)
POTASSIUM SERPL-SCNC: 4.4 MMOL/L (ref 3.5–5.2)
PROT SERPL-MCNC: 5 G/DL (ref 6–8.5)
PROT SERPL-MCNC: 5.7 G/DL (ref 6–8.5)
PROT UR QL STRIP: NEGATIVE
PROTHROMBIN TIME: 10.4 SECONDS (ref 9.6–11.7)
PROTHROMBIN TIME: 10.7 SECONDS (ref 9.6–11.7)
PROTHROMBIN TIME: 12.6 SECONDS (ref 9.6–11.7)
PROTHROMBIN TIME: 13 SECONDS (ref 9.6–11.7)
PROTHROMBIN TIME: 14.7 SECONDS (ref 9.6–11.7)
PSV: 7 CMH2O
PSV: 7 CMH2O
QT INTERVAL: 362 MS
QT INTERVAL: 429 MS
QT INTERVAL: 449 MS
QT INTERVAL: 460 MS
RBC # BLD AUTO: 1.84 10*6/MM3 (ref 3.77–5.28)
RBC # BLD AUTO: 2.67 10*6/MM3 (ref 3.77–5.28)
RBC # BLD AUTO: 3.01 10*6/MM3 (ref 3.77–5.28)
RBC # BLD AUTO: 3.47 10*6/MM3 (ref 3.77–5.28)
RBC # BLD AUTO: 3.6 10*6/MM3 (ref 3.77–5.28)
RBC # BLD AUTO: 3.63 10*6/MM3 (ref 3.77–5.28)
RBC # BLD AUTO: 3.69 10*6/MM3 (ref 3.77–5.28)
RBC # BLD AUTO: 3.89 10*6/MM3 (ref 3.77–5.28)
RBC # BLD AUTO: 4 10*6/MM3 (ref 3.77–5.28)
RBC # BLD AUTO: 4.12 10*6/MM3 (ref 3.77–5.28)
RBC # BLD AUTO: 4.31 10*6/MM3 (ref 3.77–5.28)
RBC # BLD AUTO: 4.38 10*6/MM3 (ref 3.77–5.28)
RBC # BLD AUTO: 4.38 10*6/MM3 (ref 3.77–5.28)
RBC # BLD AUTO: 4.48 10*6/MM3 (ref 3.77–5.28)
RBC # UR STRIP: ABNORMAL /HPF
REF LAB TEST METHOD: ABNORMAL
RESPIRATORY RATE: 12
RESPIRATORY RATE: 14
RESPIRATORY RATE: 14
RH BLD: POSITIVE
SAO2 % BLDCOA: 100 % (ref 95–98)
SAO2 % BLDCOA: 94.3 % (ref 94–98)
SAO2 % BLDCOA: 97.4 % (ref 94–98)
SAO2 % BLDCOA: 98.3 % (ref 94–98)
SAO2 % BLDCOA: 98.4 % (ref 94–98)
SAO2 % BLDCOA: 98.6 % (ref 94–98)
SAO2 % BLDCOA: 98.7 % (ref 94–98)
SAO2 % BLDCOA: 98.9 % (ref 94–98)
SAO2 % BLDCOA: 98.9 % (ref 94–98)
SAO2 % BLDCOA: 99 % (ref 94–98)
SAO2 % BLDCOA: 99 % (ref 94–98)
SAO2 % BLDCOA: 99.2 % (ref 94–98)
SAO2 % BLDCOA: 99.9 % (ref 94–98)
SARS-COV-2 RNA RESP QL NAA+PROBE: NOT DETECTED
SCAN SLIDE: NORMAL
SET MECH RESP RATE: 12
SET MECH RESP RATE: 12
SET MECH RESP RATE: 14
SINUS: 2.7 CM
SMALL PLATELETS BLD QL SMEAR: ABNORMAL
SODIUM BLD-SCNC: 141 MMOL/L (ref 138–146)
SODIUM BLD-SCNC: 142 MMOL/L (ref 138–146)
SODIUM BLD-SCNC: 142 MMOL/L (ref 138–146)
SODIUM BLD-SCNC: 143 MMOL/L (ref 138–146)
SODIUM BLD-SCNC: 143 MMOL/L (ref 138–146)
SODIUM BLD-SCNC: 144 MMOL/L (ref 138–146)
SODIUM BLD-SCNC: 145 MMOL/L (ref 138–146)
SODIUM BLD-SCNC: 146 MMOL/L (ref 138–146)
SODIUM BLD-SCNC: 147 MMOL/L (ref 138–146)
SODIUM BLD-SCNC: 147 MMOL/L (ref 138–146)
SODIUM BLD-SCNC: 148 MMOL/L (ref 138–146)
SODIUM SERPL-SCNC: 137 MMOL/L (ref 136–145)
SODIUM SERPL-SCNC: 140 MMOL/L (ref 136–145)
SODIUM SERPL-SCNC: 144 MMOL/L (ref 136–145)
SODIUM SERPL-SCNC: 146 MMOL/L (ref 136–145)
SODIUM SERPL-SCNC: 147 MMOL/L (ref 136–145)
SODIUM SERPL-SCNC: 148 MMOL/L (ref 136–145)
SODIUM UR-SCNC: 76 MMOL/L
SP GR UR STRIP: 1.02 (ref 1–1.03)
SQUAMOUS #/AREA URNS HPF: ABNORMAL /HPF
T&S EXPIRATION DATE: NORMAL
TOTAL RATE: 25 BREATHS/MINUTE
TOTAL RATE: 25 BREATHS/MINUTE
TOTAL RATE: 30 BREATHS/MINUTE
TRIGL SERPL-MCNC: 95 MG/DL (ref 0–150)
TROPONIN T DELTA: 106 NG/L
TROPONIN T SERPL HS-MCNC: 179 NG/L
TSH SERPL DL<=0.05 MIU/L-ACNC: 1.76 UIU/ML (ref 0.27–4.2)
TSH SERPL DL<=0.05 MIU/L-ACNC: 2.67 UIU/ML (ref 0.27–4.2)
UNIT  ABO: NORMAL
UNIT  RH: NORMAL
UROBILINOGEN UR QL STRIP: ABNORMAL
VARIANT LYMPHS NFR BLD MANUAL: 12 % (ref 19.6–45.3)
VENTILATOR MODE: ABNORMAL
VLDLC SERPL-MCNC: 17 MG/DL (ref 5–40)
VT ON VENT VENT: 400 ML
VT ON VENT VENT: 500 ML
VT ON VENT VENT: 500 ML
VT ON VENT VENT: 550 ML
VT ON VENT VENT: 550 ML
VT ON VENT VENT: 600 ML
VT ON VENT VENT: 650 ML
WBC # UR STRIP: ABNORMAL /HPF
WBC MORPH BLD: NORMAL
WBC NRBC COR # BLD: 10 10*3/MM3 (ref 3.4–10.8)
WBC NRBC COR # BLD: 10.1 10*3/MM3 (ref 3.4–10.8)
WBC NRBC COR # BLD: 12.4 10*3/MM3 (ref 3.4–10.8)
WBC NRBC COR # BLD: 12.8 10*3/MM3 (ref 3.4–10.8)
WBC NRBC COR # BLD: 13.3 10*3/MM3 (ref 3.4–10.8)
WBC NRBC COR # BLD: 13.9 10*3/MM3 (ref 3.4–10.8)
WBC NRBC COR # BLD: 18 10*3/MM3 (ref 3.4–10.8)
WBC NRBC COR # BLD: 18.4 10*3/MM3 (ref 3.4–10.8)
WBC NRBC COR # BLD: 20.8 10*3/MM3 (ref 3.4–10.8)
WBC NRBC COR # BLD: 21.7 10*3/MM3 (ref 3.4–10.8)
WBC NRBC COR # BLD: 7.3 10*3/MM3 (ref 3.4–10.8)
WBC NRBC COR # BLD: 8.3 10*3/MM3 (ref 3.4–10.8)
WBC NRBC COR # BLD: 8.5 10*3/MM3 (ref 3.4–10.8)
WBC NRBC COR # BLD: 9.4 10*3/MM3 (ref 3.4–10.8)

## 2023-01-01 PROCEDURE — 80048 BASIC METABOLIC PNL TOTAL CA: CPT | Performed by: INTERNAL MEDICINE

## 2023-01-01 PROCEDURE — 25510000001 IOPAMIDOL PER 1 ML: Performed by: INTERNAL MEDICINE

## 2023-01-01 PROCEDURE — 86900 BLOOD TYPING SEROLOGIC ABO: CPT

## 2023-01-01 PROCEDURE — 93970 EXTREMITY STUDY: CPT

## 2023-01-01 PROCEDURE — 85730 THROMBOPLASTIN TIME PARTIAL: CPT

## 2023-01-01 PROCEDURE — 97166 OT EVAL MOD COMPLEX 45 MIN: CPT

## 2023-01-01 PROCEDURE — 94002 VENT MGMT INPAT INIT DAY: CPT

## 2023-01-01 PROCEDURE — 85025 COMPLETE CBC W/AUTO DIFF WBC: CPT | Performed by: INTERNAL MEDICINE

## 2023-01-01 PROCEDURE — 93005 ELECTROCARDIOGRAM TRACING: CPT

## 2023-01-01 PROCEDURE — 85027 COMPLETE CBC AUTOMATED: CPT | Performed by: INTERNAL MEDICINE

## 2023-01-01 PROCEDURE — 93005 ELECTROCARDIOGRAM TRACING: CPT | Performed by: INTERNAL MEDICINE

## 2023-01-01 PROCEDURE — B2151ZZ FLUOROSCOPY OF LEFT HEART USING LOW OSMOLAR CONTRAST: ICD-10-PCS | Performed by: INTERNAL MEDICINE

## 2023-01-01 PROCEDURE — 85018 HEMOGLOBIN: CPT

## 2023-01-01 PROCEDURE — 82803 BLOOD GASES ANY COMBINATION: CPT

## 2023-01-01 PROCEDURE — 97162 PT EVAL MOD COMPLEX 30 MIN: CPT | Performed by: PHYSICAL THERAPIST

## 2023-01-01 PROCEDURE — P9041 ALBUMIN (HUMAN),5%, 50ML: HCPCS

## 2023-01-01 PROCEDURE — C1713 ANCHOR/SCREW BN/BN,TIS/BN: HCPCS

## 2023-01-01 PROCEDURE — 84100 ASSAY OF PHOSPHORUS: CPT

## 2023-01-01 PROCEDURE — 94799 UNLISTED PULMONARY SVC/PX: CPT

## 2023-01-01 PROCEDURE — C1887 CATHETER, GUIDING: HCPCS

## 2023-01-01 PROCEDURE — 80051 ELECTROLYTE PANEL: CPT

## 2023-01-01 PROCEDURE — 99285 EMERGENCY DEPT VISIT HI MDM: CPT

## 2023-01-01 PROCEDURE — 82330 ASSAY OF CALCIUM: CPT | Performed by: INTERNAL MEDICINE

## 2023-01-01 PROCEDURE — 25010000002 ATROPINE SULFATE

## 2023-01-01 PROCEDURE — 97168 OT RE-EVAL EST PLAN CARE: CPT

## 2023-01-01 PROCEDURE — 87641 MR-STAPH DNA AMP PROBE: CPT

## 2023-01-01 PROCEDURE — 83735 ASSAY OF MAGNESIUM: CPT | Performed by: INTERNAL MEDICINE

## 2023-01-01 PROCEDURE — 97530 THERAPEUTIC ACTIVITIES: CPT

## 2023-01-01 PROCEDURE — 82330 ASSAY OF CALCIUM: CPT | Performed by: STUDENT IN AN ORGANIZED HEALTH CARE EDUCATION/TRAINING PROGRAM

## 2023-01-01 PROCEDURE — C1751 CATH, INF, PER/CENT/MIDLINE: HCPCS

## 2023-01-01 PROCEDURE — 25010000002 MAGNESIUM SULFATE IN D5W 1G/100ML (PREMIX) 1-5 GM/100ML-% SOLUTION

## 2023-01-01 PROCEDURE — 85027 COMPLETE CBC AUTOMATED: CPT | Performed by: STUDENT IN AN ORGANIZED HEALTH CARE EDUCATION/TRAINING PROGRAM

## 2023-01-01 PROCEDURE — 5A1935Z RESPIRATORY VENTILATION, LESS THAN 24 CONSECUTIVE HOURS: ICD-10-PCS

## 2023-01-01 PROCEDURE — 83735 ASSAY OF MAGNESIUM: CPT

## 2023-01-01 PROCEDURE — 93308 TTE F-UP OR LMTD: CPT

## 2023-01-01 PROCEDURE — 99255 IP/OBS CONSLTJ NEW/EST HI 80: CPT | Performed by: INTERNAL MEDICINE

## 2023-01-01 PROCEDURE — 36430 TRANSFUSION BLD/BLD COMPNT: CPT

## 2023-01-01 PROCEDURE — 86850 RBC ANTIBODY SCREEN: CPT

## 2023-01-01 PROCEDURE — 85025 COMPLETE CBC W/AUTO DIFF WBC: CPT | Performed by: EMERGENCY MEDICINE

## 2023-01-01 PROCEDURE — 84295 ASSAY OF SERUM SODIUM: CPT

## 2023-01-01 PROCEDURE — 86901 BLOOD TYPING SEROLOGIC RH(D): CPT

## 2023-01-01 PROCEDURE — 94003 VENT MGMT INPAT SUBQ DAY: CPT

## 2023-01-01 PROCEDURE — 33533 CABG ARTERIAL SINGLE: CPT

## 2023-01-01 PROCEDURE — 85379 FIBRIN DEGRADATION QUANT: CPT | Performed by: EMERGENCY MEDICINE

## 2023-01-01 PROCEDURE — 25010000002 HEPARIN (PORCINE) PER 1000 UNITS

## 2023-01-01 PROCEDURE — 82310 ASSAY OF CALCIUM: CPT

## 2023-01-01 PROCEDURE — 02100Z9 BYPASS CORONARY ARTERY, ONE ARTERY FROM LEFT INTERNAL MAMMARY, OPEN APPROACH: ICD-10-PCS

## 2023-01-01 PROCEDURE — C1729 CATH, DRAINAGE: HCPCS

## 2023-01-01 PROCEDURE — 0 LIDOCAINE 1 % SOLUTION: Performed by: INTERNAL MEDICINE

## 2023-01-01 PROCEDURE — 93010 ELECTROCARDIOGRAM REPORT: CPT | Performed by: INTERNAL MEDICINE

## 2023-01-01 PROCEDURE — 93458 L HRT ARTERY/VENTRICLE ANGIO: CPT | Performed by: INTERNAL MEDICINE

## 2023-01-01 PROCEDURE — 82330 ASSAY OF CALCIUM: CPT

## 2023-01-01 PROCEDURE — 85025 COMPLETE CBC W/AUTO DIFF WBC: CPT

## 2023-01-01 PROCEDURE — 85576 BLOOD PLATELET AGGREGATION: CPT

## 2023-01-01 PROCEDURE — 99152 MOD SED SAME PHYS/QHP 5/>YRS: CPT | Performed by: INTERNAL MEDICINE

## 2023-01-01 PROCEDURE — 93306 TTE W/DOPPLER COMPLETE: CPT

## 2023-01-01 PROCEDURE — 0 POTASSIUM CHLORIDE PER 2 MEQ

## 2023-01-01 PROCEDURE — 80053 COMPREHEN METABOLIC PANEL: CPT

## 2023-01-01 PROCEDURE — 84300 ASSAY OF URINE SODIUM: CPT | Performed by: INTERNAL MEDICINE

## 2023-01-01 PROCEDURE — 36600 WITHDRAWAL OF ARTERIAL BLOOD: CPT

## 2023-01-01 PROCEDURE — 36415 COLL VENOUS BLD VENIPUNCTURE: CPT | Performed by: STUDENT IN AN ORGANIZED HEALTH CARE EDUCATION/TRAINING PROGRAM

## 2023-01-01 PROCEDURE — 85347 COAGULATION TIME ACTIVATED: CPT

## 2023-01-01 PROCEDURE — 25010000002 ALBUMIN HUMAN 5% PER 50 ML

## 2023-01-01 PROCEDURE — 85610 PROTHROMBIN TIME: CPT

## 2023-01-01 PROCEDURE — 25010000002 HEPARIN (PORCINE) 25000-0.45 UT/250ML-% SOLUTION: Performed by: STUDENT IN AN ORGANIZED HEALTH CARE EDUCATION/TRAINING PROGRAM

## 2023-01-01 PROCEDURE — 87635 SARS-COV-2 COVID-19 AMP PRB: CPT

## 2023-01-01 PROCEDURE — 0 MILRINONE LACTATE IN DEXTROSE 20-5 MG/100ML-% SOLUTION

## 2023-01-01 PROCEDURE — 94761 N-INVAS EAR/PLS OXIMETRY MLT: CPT

## 2023-01-01 PROCEDURE — 84100 ASSAY OF PHOSPHORUS: CPT | Performed by: INTERNAL MEDICINE

## 2023-01-01 PROCEDURE — 33508 ENDOSCOPIC VEIN HARVEST: CPT

## 2023-01-01 PROCEDURE — 83605 ASSAY OF LACTIC ACID: CPT

## 2023-01-01 PROCEDURE — 06BP4ZZ EXCISION OF RIGHT SAPHENOUS VEIN, PERCUTANEOUS ENDOSCOPIC APPROACH: ICD-10-PCS

## 2023-01-01 PROCEDURE — 25010000002 MAGNESIUM SULFATE 2 GM/50ML SOLUTION

## 2023-01-01 PROCEDURE — 97535 SELF CARE MNGMENT TRAINING: CPT

## 2023-01-01 PROCEDURE — 99233 SBSQ HOSP IP/OBS HIGH 50: CPT | Performed by: INTERNAL MEDICINE

## 2023-01-01 PROCEDURE — 84443 ASSAY THYROID STIM HORMONE: CPT | Performed by: STUDENT IN AN ORGANIZED HEALTH CARE EDUCATION/TRAINING PROGRAM

## 2023-01-01 PROCEDURE — 93880 EXTRACRANIAL BILAT STUDY: CPT

## 2023-01-01 PROCEDURE — 97164 PT RE-EVAL EST PLAN CARE: CPT

## 2023-01-01 PROCEDURE — 83935 ASSAY OF URINE OSMOLALITY: CPT | Performed by: INTERNAL MEDICINE

## 2023-01-01 PROCEDURE — A4648 IMPLANTABLE TISSUE MARKER: HCPCS

## 2023-01-01 PROCEDURE — 82948 REAGENT STRIP/BLOOD GLUCOSE: CPT

## 2023-01-01 PROCEDURE — 86923 COMPATIBILITY TEST ELECTRIC: CPT

## 2023-01-01 PROCEDURE — 36415 COLL VENOUS BLD VENIPUNCTURE: CPT | Performed by: INTERNAL MEDICINE

## 2023-01-01 PROCEDURE — 84132 ASSAY OF SERUM POTASSIUM: CPT

## 2023-01-01 PROCEDURE — 0 VASOPRESSIN 0.2 UNIT/ML SOLUTION

## 2023-01-01 PROCEDURE — 82947 ASSAY GLUCOSE BLOOD QUANT: CPT

## 2023-01-01 PROCEDURE — 25010000002 HEPARIN (PORCINE) 25000-0.45 UT/250ML-% SOLUTION: Performed by: INTERNAL MEDICINE

## 2023-01-01 PROCEDURE — 81001 URINALYSIS AUTO W/SCOPE: CPT

## 2023-01-01 PROCEDURE — 85730 THROMBOPLASTIN TIME PARTIAL: CPT | Performed by: INTERNAL MEDICINE

## 2023-01-01 PROCEDURE — P9035 PLATELET PHERES LEUKOREDUCED: HCPCS

## 2023-01-01 PROCEDURE — 0 POTASSIUM CHLORIDE PER 2 MEQ: Performed by: NURSE PRACTITIONER

## 2023-01-01 PROCEDURE — 85384 FIBRINOGEN ACTIVITY: CPT

## 2023-01-01 PROCEDURE — 25010000002 PAPAVERINE PER 60 MG

## 2023-01-01 PROCEDURE — 25010000002 MIDAZOLAM PER 1 MG: Performed by: INTERNAL MEDICINE

## 2023-01-01 PROCEDURE — 25010000002 ACETAMINOPHEN 10 MG/ML SOLUTION: Performed by: NURSE PRACTITIONER

## 2023-01-01 PROCEDURE — 33508 ENDOSCOPIC VEIN HARVEST: CPT | Performed by: PHYSICIAN ASSISTANT

## 2023-01-01 PROCEDURE — 25010000002 ACETAMINOPHEN 10 MG/ML SOLUTION

## 2023-01-01 PROCEDURE — 85027 COMPLETE CBC AUTOMATED: CPT

## 2023-01-01 PROCEDURE — 84443 ASSAY THYROID STIM HORMONE: CPT | Performed by: INTERNAL MEDICINE

## 2023-01-01 PROCEDURE — 85007 BL SMEAR W/DIFF WBC COUNT: CPT

## 2023-01-01 PROCEDURE — 80061 LIPID PANEL: CPT | Performed by: STUDENT IN AN ORGANIZED HEALTH CARE EDUCATION/TRAINING PROGRAM

## 2023-01-01 PROCEDURE — 93308 TTE F-UP OR LMTD: CPT | Performed by: INTERNAL MEDICINE

## 2023-01-01 PROCEDURE — 25010000002 CALCIUM GLUCONATE 2-0.675 GM/100ML-% SOLUTION: Performed by: NURSE PRACTITIONER

## 2023-01-01 PROCEDURE — 25010000002 LORAZEPAM PER 2 MG

## 2023-01-01 PROCEDURE — 31500 INSERT EMERGENCY AIRWAY: CPT | Performed by: NURSE PRACTITIONER

## 2023-01-01 PROCEDURE — 85730 THROMBOPLASTIN TIME PARTIAL: CPT | Performed by: EMERGENCY MEDICINE

## 2023-01-01 PROCEDURE — P9100 PATHOGEN TEST FOR PLATELETS: HCPCS

## 2023-01-01 PROCEDURE — 86927 PLASMA FRESH FROZEN: CPT

## 2023-01-01 PROCEDURE — 25010000002 MORPHINE PER 10 MG: Performed by: NURSE PRACTITIONER

## 2023-01-01 PROCEDURE — 71045 X-RAY EXAM CHEST 1 VIEW: CPT

## 2023-01-01 PROCEDURE — 25010000002 FUROSEMIDE PER 20 MG: Performed by: INTERNAL MEDICINE

## 2023-01-01 PROCEDURE — 85610 PROTHROMBIN TIME: CPT | Performed by: INTERNAL MEDICINE

## 2023-01-01 PROCEDURE — P9016 RBC LEUKOCYTES REDUCED: HCPCS

## 2023-01-01 PROCEDURE — 25010000002 MORPHINE PER 10 MG

## 2023-01-01 PROCEDURE — 25010000002 ONDANSETRON PER 1 MG

## 2023-01-01 PROCEDURE — B2111ZZ FLUOROSCOPY OF MULTIPLE CORONARY ARTERIES USING LOW OSMOLAR CONTRAST: ICD-10-PCS | Performed by: INTERNAL MEDICINE

## 2023-01-01 PROCEDURE — 25010000002 PROPOFOL 10 MG/ML EMULSION

## 2023-01-01 PROCEDURE — 85610 PROTHROMBIN TIME: CPT | Performed by: EMERGENCY MEDICINE

## 2023-01-01 PROCEDURE — C1894 INTRO/SHEATH, NON-LASER: HCPCS | Performed by: INTERNAL MEDICINE

## 2023-01-01 PROCEDURE — 33518 CABG ARTERY-VEIN TWO: CPT

## 2023-01-01 PROCEDURE — C1769 GUIDE WIRE: HCPCS | Performed by: INTERNAL MEDICINE

## 2023-01-01 PROCEDURE — P9012 CRYOPRECIPITATE EACH UNIT: HCPCS

## 2023-01-01 PROCEDURE — 25010000002 KETOROLAC TROMETHAMINE PER 15 MG

## 2023-01-01 PROCEDURE — 25010000002 CEFAZOLIN PER 500 MG

## 2023-01-01 PROCEDURE — 33518 CABG ARTERY-VEIN TWO: CPT | Performed by: PHYSICIAN ASSISTANT

## 2023-01-01 PROCEDURE — 25010000002 CALCIUM GLUCONATE 2-0.675 GM/100ML-% SOLUTION

## 2023-01-01 PROCEDURE — 25010000002 ENOXAPARIN PER 10 MG

## 2023-01-01 PROCEDURE — 99024 POSTOP FOLLOW-UP VISIT: CPT | Performed by: THORACIC SURGERY (CARDIOTHORACIC VASCULAR SURGERY)

## 2023-01-01 PROCEDURE — 25010000002 CALCIUM GLUCONATE-NACL 1-0.675 GM/50ML-% SOLUTION

## 2023-01-01 PROCEDURE — 25010000002 FUROSEMIDE PER 20 MG

## 2023-01-01 PROCEDURE — 25010000002 EPINEPHRINE 1 MG/ML SOLUTION 30 ML VIAL

## 2023-01-01 PROCEDURE — 83735 ASSAY OF MAGNESIUM: CPT | Performed by: STUDENT IN AN ORGANIZED HEALTH CARE EDUCATION/TRAINING PROGRAM

## 2023-01-01 PROCEDURE — 83880 ASSAY OF NATRIURETIC PEPTIDE: CPT

## 2023-01-01 PROCEDURE — 87086 URINE CULTURE/COLONY COUNT: CPT

## 2023-01-01 PROCEDURE — 80048 BASIC METABOLIC PNL TOTAL CA: CPT

## 2023-01-01 PROCEDURE — 4A023N7 MEASUREMENT OF CARDIAC SAMPLING AND PRESSURE, LEFT HEART, PERCUTANEOUS APPROACH: ICD-10-PCS | Performed by: INTERNAL MEDICINE

## 2023-01-01 PROCEDURE — 85730 THROMBOPLASTIN TIME PARTIAL: CPT | Performed by: STUDENT IN AN ORGANIZED HEALTH CARE EDUCATION/TRAINING PROGRAM

## 2023-01-01 PROCEDURE — 021109W BYPASS CORONARY ARTERY, TWO ARTERIES FROM AORTA WITH AUTOLOGOUS VENOUS TISSUE, OPEN APPROACH: ICD-10-PCS

## 2023-01-01 PROCEDURE — 80069 RENAL FUNCTION PANEL: CPT

## 2023-01-01 PROCEDURE — 25010000002 AMIODARONE IN DEXTROSE 5% 150-4.21 MG/100ML-% SOLUTION

## 2023-01-01 PROCEDURE — P9017 PLASMA 1 DONOR FRZ W/IN 8 HR: HCPCS

## 2023-01-01 PROCEDURE — 93306 TTE W/DOPPLER COMPLETE: CPT | Performed by: INTERNAL MEDICINE

## 2023-01-01 PROCEDURE — 84484 ASSAY OF TROPONIN QUANT: CPT | Performed by: EMERGENCY MEDICINE

## 2023-01-01 PROCEDURE — 85014 HEMATOCRIT: CPT

## 2023-01-01 PROCEDURE — 84484 ASSAY OF TROPONIN QUANT: CPT | Performed by: STUDENT IN AN ORGANIZED HEALTH CARE EDUCATION/TRAINING PROGRAM

## 2023-01-01 PROCEDURE — 0BH17EZ INSERTION OF ENDOTRACHEAL AIRWAY INTO TRACHEA, VIA NATURAL OR ARTIFICIAL OPENING: ICD-10-PCS

## 2023-01-01 PROCEDURE — 33533 CABG ARTERIAL SINGLE: CPT | Performed by: PHYSICIAN ASSISTANT

## 2023-01-01 PROCEDURE — 83036 HEMOGLOBIN GLYCOSYLATED A1C: CPT | Performed by: STUDENT IN AN ORGANIZED HEALTH CARE EDUCATION/TRAINING PROGRAM

## 2023-01-01 PROCEDURE — 80053 COMPREHEN METABOLIC PANEL: CPT | Performed by: STUDENT IN AN ORGANIZED HEALTH CARE EDUCATION/TRAINING PROGRAM

## 2023-01-01 RX ORDER — SODIUM CHLORIDE 9 MG/ML
40 INJECTION, SOLUTION INTRAVENOUS AS NEEDED
Status: DISCONTINUED | OUTPATIENT
Start: 2023-01-01 | End: 2023-01-01

## 2023-01-01 RX ORDER — METOPROLOL SUCCINATE 25 MG/1
12.5 TABLET, EXTENDED RELEASE ORAL
Status: DISCONTINUED | OUTPATIENT
Start: 2023-01-01 | End: 2023-01-01

## 2023-01-01 RX ORDER — LORAZEPAM 0.5 MG/1
0.5 TABLET ORAL
Status: DISCONTINUED | OUTPATIENT
Start: 2023-01-01 | End: 2023-01-01 | Stop reason: HOSPADM

## 2023-01-01 RX ORDER — MAGNESIUM SULFATE HEPTAHYDRATE 40 MG/ML
INJECTION, SOLUTION INTRAVENOUS
Status: COMPLETED
Start: 2023-01-01 | End: 2023-01-01

## 2023-01-01 RX ORDER — ONDANSETRON 2 MG/ML
4 INJECTION INTRAMUSCULAR; INTRAVENOUS EVERY 6 HOURS PRN
Status: DISCONTINUED | OUTPATIENT
Start: 2023-01-01 | End: 2023-01-01

## 2023-01-01 RX ORDER — ACETAMINOPHEN 10 MG/ML
1000 INJECTION, SOLUTION INTRAVENOUS ONCE
Status: COMPLETED | OUTPATIENT
Start: 2023-01-01 | End: 2023-01-01

## 2023-01-01 RX ORDER — ACETAMINOPHEN 160 MG/5ML
650 SOLUTION ORAL EVERY 4 HOURS PRN
Status: DISCONTINUED | OUTPATIENT
Start: 2023-01-01 | End: 2023-01-01

## 2023-01-01 RX ORDER — LORAZEPAM 2 MG/ML
1 INJECTION INTRAMUSCULAR
Status: DISCONTINUED | OUTPATIENT
Start: 2023-01-01 | End: 2023-01-01 | Stop reason: HOSPADM

## 2023-01-01 RX ORDER — METOPROLOL SUCCINATE 25 MG/1
12.5 TABLET, EXTENDED RELEASE ORAL ONCE
Status: COMPLETED | OUTPATIENT
Start: 2023-01-01 | End: 2023-01-01

## 2023-01-01 RX ORDER — LORAZEPAM 2 MG/ML
1 CONCENTRATE ORAL
Status: DISCONTINUED | OUTPATIENT
Start: 2023-01-01 | End: 2023-01-01 | Stop reason: HOSPADM

## 2023-01-01 RX ORDER — ONDANSETRON 4 MG/1
4 TABLET, FILM COATED ORAL EVERY 6 HOURS PRN
Status: DISCONTINUED | OUTPATIENT
Start: 2023-01-01 | End: 2023-01-01 | Stop reason: SDUPTHER

## 2023-01-01 RX ORDER — CHLORHEXIDINE GLUCONATE 500 MG/1
1 CLOTH TOPICAL EVERY 12 HOURS PRN
Status: DISCONTINUED | OUTPATIENT
Start: 2023-01-01 | End: 2023-01-01

## 2023-01-01 RX ORDER — LORAZEPAM 1 MG/1
2 TABLET ORAL
Status: DISCONTINUED | OUTPATIENT
Start: 2023-01-01 | End: 2023-01-01 | Stop reason: HOSPADM

## 2023-01-01 RX ORDER — LORAZEPAM 1 MG/1
1 TABLET ORAL
Status: DISCONTINUED | OUTPATIENT
Start: 2023-01-01 | End: 2023-01-01 | Stop reason: HOSPADM

## 2023-01-01 RX ORDER — SODIUM CHLORIDE 450 MG/100ML
75 INJECTION, SOLUTION INTRAVENOUS CONTINUOUS
Status: DISCONTINUED | OUTPATIENT
Start: 2023-01-01 | End: 2023-01-01

## 2023-01-01 RX ORDER — BISACODYL 5 MG/1
10 TABLET, DELAYED RELEASE ORAL DAILY PRN
Status: DISCONTINUED | OUTPATIENT
Start: 2023-01-01 | End: 2023-01-01

## 2023-01-01 RX ORDER — ALBUMIN, HUMAN INJ 5% 5 %
1500 SOLUTION INTRAVENOUS AS NEEDED
Status: DISCONTINUED | OUTPATIENT
Start: 2023-01-01 | End: 2023-01-01

## 2023-01-01 RX ORDER — ACETAMINOPHEN 160 MG/5ML
650 SOLUTION ORAL EVERY 4 HOURS
Status: DISCONTINUED | OUTPATIENT
Start: 2023-01-01 | End: 2023-01-01

## 2023-01-01 RX ORDER — CALCIUM GLUCONATE 20 MG/ML
2000 INJECTION, SOLUTION INTRAVENOUS ONCE
Status: COMPLETED | OUTPATIENT
Start: 2023-01-01 | End: 2023-01-01

## 2023-01-01 RX ORDER — SODIUM BICARBONATE 650 MG/1
650 TABLET ORAL 3 TIMES DAILY
Status: DISCONTINUED | OUTPATIENT
Start: 2023-01-01 | End: 2023-01-01

## 2023-01-01 RX ORDER — LORAZEPAM 2 MG/ML
0.5 INJECTION INTRAMUSCULAR
Status: DISCONTINUED | OUTPATIENT
Start: 2023-01-01 | End: 2023-01-01 | Stop reason: HOSPADM

## 2023-01-01 RX ORDER — ALBUMIN (HUMAN) 12.5 G/50ML
12.5 SOLUTION INTRAVENOUS ONCE
Status: DISCONTINUED | OUTPATIENT
Start: 2023-01-01 | End: 2023-01-01

## 2023-01-01 RX ORDER — MILRINONE LACTATE 0.2 MG/ML
0.25 INJECTION, SOLUTION INTRAVENOUS
Status: DISCONTINUED | OUTPATIENT
Start: 2023-01-01 | End: 2023-01-01

## 2023-01-01 RX ORDER — LORAZEPAM 2 MG/ML
0.5 CONCENTRATE ORAL
Status: DISCONTINUED | OUTPATIENT
Start: 2023-01-01 | End: 2023-01-01 | Stop reason: HOSPADM

## 2023-01-01 RX ORDER — ACETAMINOPHEN 650 MG/1
650 SUPPOSITORY RECTAL EVERY 4 HOURS PRN
Status: DISCONTINUED | OUTPATIENT
Start: 2023-01-01 | End: 2023-01-01

## 2023-01-01 RX ORDER — ACETAMINOPHEN 325 MG/1
650 TABLET ORAL EVERY 4 HOURS PRN
Status: DISCONTINUED | OUTPATIENT
Start: 2023-01-01 | End: 2023-01-01

## 2023-01-01 RX ORDER — LORAZEPAM 2 MG/ML
2 CONCENTRATE ORAL
Status: DISCONTINUED | OUTPATIENT
Start: 2023-01-01 | End: 2023-01-01 | Stop reason: HOSPADM

## 2023-01-01 RX ORDER — ACETAMINOPHEN 160 MG/5ML
650 SOLUTION ORAL EVERY 4 HOURS PRN
Status: DISCONTINUED | OUTPATIENT
Start: 2023-01-01 | End: 2023-01-01 | Stop reason: HOSPADM

## 2023-01-01 RX ORDER — NALOXONE HCL 0.4 MG/ML
0.4 VIAL (ML) INJECTION
Status: DISCONTINUED | OUTPATIENT
Start: 2023-01-01 | End: 2023-01-01

## 2023-01-01 RX ORDER — METOPROLOL SUCCINATE 25 MG/1
25 TABLET, EXTENDED RELEASE ORAL
Status: DISCONTINUED | OUTPATIENT
Start: 2023-01-01 | End: 2023-01-01

## 2023-01-01 RX ORDER — LORAZEPAM 2 MG/ML
INJECTION INTRAMUSCULAR
Status: COMPLETED
Start: 2023-01-01 | End: 2023-01-01

## 2023-01-01 RX ORDER — OXYCODONE HYDROCHLORIDE 5 MG/1
10 TABLET ORAL EVERY 4 HOURS PRN
Status: DISCONTINUED | OUTPATIENT
Start: 2023-01-01 | End: 2023-01-01

## 2023-01-01 RX ORDER — SODIUM CHLORIDE 0.9 % (FLUSH) 0.9 %
10 SYRINGE (ML) INJECTION EVERY 12 HOURS SCHEDULED
Status: DISCONTINUED | OUTPATIENT
Start: 2023-01-01 | End: 2023-01-01

## 2023-01-01 RX ORDER — NITROGLYCERIN 20 MG/100ML
10-50 INJECTION INTRAVENOUS
Status: DISCONTINUED | OUTPATIENT
Start: 2023-01-01 | End: 2023-01-01

## 2023-01-01 RX ORDER — MEPERIDINE HYDROCHLORIDE 25 MG/ML
25 INJECTION INTRAMUSCULAR; INTRAVENOUS; SUBCUTANEOUS EVERY 4 HOURS PRN
Status: DISCONTINUED | OUTPATIENT
Start: 2023-01-01 | End: 2023-01-01

## 2023-01-01 RX ORDER — KETOROLAC TROMETHAMINE 15 MG/ML
15 INJECTION, SOLUTION INTRAMUSCULAR; INTRAVENOUS ONCE
Status: COMPLETED | OUTPATIENT
Start: 2023-01-01 | End: 2023-01-01

## 2023-01-01 RX ORDER — LORAZEPAM 2 MG/ML
2 INJECTION INTRAMUSCULAR
Status: DISCONTINUED | OUTPATIENT
Start: 2023-01-01 | End: 2023-01-01 | Stop reason: HOSPADM

## 2023-01-01 RX ORDER — CALCIUM GLUCONATE 20 MG/ML
1000 INJECTION, SOLUTION INTRAVENOUS ONCE
Status: COMPLETED | OUTPATIENT
Start: 2023-01-01 | End: 2023-01-01

## 2023-01-01 RX ORDER — SODIUM CHLORIDE 9 MG/ML
250 INJECTION, SOLUTION INTRAVENOUS ONCE AS NEEDED
Status: DISCONTINUED | OUTPATIENT
Start: 2023-01-01 | End: 2023-01-01

## 2023-01-01 RX ORDER — ATORVASTATIN CALCIUM 40 MG/1
80 TABLET, FILM COATED ORAL DAILY
Status: DISCONTINUED | OUTPATIENT
Start: 2023-01-01 | End: 2023-01-01

## 2023-01-01 RX ORDER — ENOXAPARIN SODIUM 100 MG/ML
40 INJECTION SUBCUTANEOUS DAILY
Status: DISCONTINUED | OUTPATIENT
Start: 2023-01-01 | End: 2023-01-01

## 2023-01-01 RX ORDER — FUROSEMIDE 10 MG/ML
20 INJECTION INTRAMUSCULAR; INTRAVENOUS ONCE
Status: COMPLETED | OUTPATIENT
Start: 2023-01-01 | End: 2023-01-01

## 2023-01-01 RX ORDER — CHLORHEXIDINE GLUCONATE 0.12 MG/ML
15 RINSE ORAL EVERY 12 HOURS
Status: DISCONTINUED | OUTPATIENT
Start: 2023-01-01 | End: 2023-01-01

## 2023-01-01 RX ORDER — POTASSIUM CHLORIDE 29.8 MG/ML
20 INJECTION INTRAVENOUS ONCE
Status: COMPLETED | OUTPATIENT
Start: 2023-01-01 | End: 2023-01-01

## 2023-01-01 RX ORDER — FUROSEMIDE 10 MG/ML
40 INJECTION INTRAMUSCULAR; INTRAVENOUS ONCE
Status: CANCELLED | OUTPATIENT
Start: 2023-01-01

## 2023-01-01 RX ORDER — LIDOCAINE HYDROCHLORIDE 10 MG/ML
INJECTION, SOLUTION INFILTRATION; PERINEURAL
Status: DISCONTINUED | OUTPATIENT
Start: 2023-01-01 | End: 2023-01-01 | Stop reason: HOSPADM

## 2023-01-01 RX ORDER — FUROSEMIDE 10 MG/ML
40 INJECTION INTRAMUSCULAR; INTRAVENOUS ONCE
Status: COMPLETED | OUTPATIENT
Start: 2023-01-01 | End: 2023-01-01

## 2023-01-01 RX ORDER — ALBUMIN, HUMAN INJ 5% 5 %
250 SOLUTION INTRAVENOUS ONCE
Status: COMPLETED | OUTPATIENT
Start: 2023-01-01 | End: 2023-01-01

## 2023-01-01 RX ORDER — SODIUM CHLORIDE, SODIUM LACTATE, POTASSIUM CHLORIDE, CALCIUM CHLORIDE 600; 310; 30; 20 MG/100ML; MG/100ML; MG/100ML; MG/100ML
50 INJECTION, SOLUTION INTRAVENOUS CONTINUOUS
Status: DISCONTINUED | OUTPATIENT
Start: 2023-01-01 | End: 2023-01-01

## 2023-01-01 RX ORDER — DEXMEDETOMIDINE HYDROCHLORIDE 4 UG/ML
.2-1.5 INJECTION, SOLUTION INTRAVENOUS
Status: DISCONTINUED | OUTPATIENT
Start: 2023-01-01 | End: 2023-01-01

## 2023-01-01 RX ORDER — PANTOPRAZOLE SODIUM 40 MG/1
40 TABLET, DELAYED RELEASE ORAL
Status: DISCONTINUED | OUTPATIENT
Start: 2023-01-01 | End: 2023-01-01

## 2023-01-01 RX ORDER — DEXTROSE MONOHYDRATE 25 G/50ML
10-50 INJECTION, SOLUTION INTRAVENOUS
Status: DISCONTINUED | OUTPATIENT
Start: 2023-01-01 | End: 2023-01-01

## 2023-01-01 RX ORDER — POTASSIUM CHLORIDE 29.8 MG/ML
INJECTION INTRAVENOUS
Status: COMPLETED
Start: 2023-01-01 | End: 2023-01-01

## 2023-01-01 RX ORDER — MIDAZOLAM HYDROCHLORIDE 1 MG/ML
INJECTION INTRAMUSCULAR; INTRAVENOUS
Status: DISCONTINUED | OUTPATIENT
Start: 2023-01-01 | End: 2023-01-01 | Stop reason: HOSPADM

## 2023-01-01 RX ORDER — MAGNESIUM HYDROXIDE 1200 MG/15ML
LIQUID ORAL AS NEEDED
Status: DISCONTINUED | OUTPATIENT
Start: 2023-01-01 | End: 2023-01-01 | Stop reason: HOSPADM

## 2023-01-01 RX ORDER — ONDANSETRON 4 MG/1
4 TABLET, FILM COATED ORAL EVERY 6 HOURS PRN
Status: DISCONTINUED | OUTPATIENT
Start: 2023-01-01 | End: 2023-01-01

## 2023-01-01 RX ORDER — NICOTINE POLACRILEX 4 MG
15 LOZENGE BUCCAL
Status: DISCONTINUED | OUTPATIENT
Start: 2023-01-01 | End: 2023-01-01

## 2023-01-01 RX ORDER — ONDANSETRON 2 MG/ML
4 INJECTION INTRAMUSCULAR; INTRAVENOUS EVERY 6 HOURS PRN
Status: DISCONTINUED | OUTPATIENT
Start: 2023-01-01 | End: 2023-01-01 | Stop reason: SDUPTHER

## 2023-01-01 RX ORDER — ASPIRIN 81 MG/1
81 TABLET ORAL DAILY
Status: DISCONTINUED | OUTPATIENT
Start: 2023-01-01 | End: 2023-01-01

## 2023-01-01 RX ORDER — SODIUM CHLORIDE 0.9 % (FLUSH) 0.9 %
10 SYRINGE (ML) INJECTION AS NEEDED
Status: DISCONTINUED | OUTPATIENT
Start: 2023-01-01 | End: 2023-01-01

## 2023-01-01 RX ORDER — NOREPINEPHRINE BITARTRATE 0.03 MG/ML
.02-.3 INJECTION, SOLUTION INTRAVENOUS CONTINUOUS PRN
Status: DISCONTINUED | OUTPATIENT
Start: 2023-01-01 | End: 2023-01-01

## 2023-01-01 RX ORDER — TRAMADOL HYDROCHLORIDE 50 MG/1
25 TABLET ORAL EVERY 6 HOURS PRN
Status: DISCONTINUED | OUTPATIENT
Start: 2023-01-01 | End: 2023-01-01

## 2023-01-01 RX ORDER — ACETAMINOPHEN 650 MG/1
650 SUPPOSITORY RECTAL EVERY 4 HOURS PRN
Status: DISCONTINUED | OUTPATIENT
Start: 2023-01-01 | End: 2023-01-01 | Stop reason: HOSPADM

## 2023-01-01 RX ORDER — MAGNESIUM SULFATE HEPTAHYDRATE 40 MG/ML
2 INJECTION, SOLUTION INTRAVENOUS
Status: DISPENSED | OUTPATIENT
Start: 2023-01-01 | End: 2023-01-01

## 2023-01-01 RX ORDER — ASPIRIN 81 MG/1
81 TABLET, CHEWABLE ORAL DAILY
Status: DISCONTINUED | OUTPATIENT
Start: 2023-01-01 | End: 2023-01-01

## 2023-01-01 RX ORDER — CYCLOBENZAPRINE HCL 10 MG
10 TABLET ORAL EVERY 8 HOURS PRN
Status: DISCONTINUED | OUTPATIENT
Start: 2023-01-01 | End: 2023-01-01

## 2023-01-01 RX ORDER — ALBUMIN, HUMAN INJ 5% 5 %
12.5 SOLUTION INTRAVENOUS AS NEEDED
Status: COMPLETED | OUTPATIENT
Start: 2023-01-01 | End: 2023-01-01

## 2023-01-01 RX ORDER — ATORVASTATIN CALCIUM 40 MG/1
40 TABLET, FILM COATED ORAL NIGHTLY
Status: DISCONTINUED | OUTPATIENT
Start: 2023-01-01 | End: 2023-01-01

## 2023-01-01 RX ORDER — NITROGLYCERIN 0.4 MG/1
0.4 TABLET SUBLINGUAL
Status: DISCONTINUED | OUTPATIENT
Start: 2023-01-01 | End: 2023-01-01

## 2023-01-01 RX ORDER — SODIUM CHLORIDE 0.9 % (FLUSH) 0.9 %
3-10 SYRINGE (ML) INJECTION AS NEEDED
Status: DISCONTINUED | OUTPATIENT
Start: 2023-01-01 | End: 2023-01-01

## 2023-01-01 RX ORDER — SODIUM CHLORIDE 0.9 % (FLUSH) 0.9 %
3 SYRINGE (ML) INJECTION EVERY 12 HOURS SCHEDULED
Status: DISCONTINUED | OUTPATIENT
Start: 2023-01-01 | End: 2023-01-01

## 2023-01-01 RX ORDER — AMOXICILLIN 250 MG
2 CAPSULE ORAL NIGHTLY
Status: DISCONTINUED | OUTPATIENT
Start: 2023-01-01 | End: 2023-01-01

## 2023-01-01 RX ORDER — CARBOXYMETHYLCELLULOSE SODIUM 10 MG/ML
1 GEL OPHTHALMIC
Status: DISCONTINUED | OUTPATIENT
Start: 2023-01-01 | End: 2023-01-01 | Stop reason: HOSPADM

## 2023-01-01 RX ORDER — PANTOPRAZOLE SODIUM 40 MG/10ML
40 INJECTION, POWDER, LYOPHILIZED, FOR SOLUTION INTRAVENOUS ONCE
Status: COMPLETED | OUTPATIENT
Start: 2023-01-01 | End: 2023-01-01

## 2023-01-01 RX ORDER — DIPHENHYDRAMINE HCL 25 MG
25 CAPSULE ORAL EVERY 6 HOURS PRN
Status: DISCONTINUED | OUTPATIENT
Start: 2023-01-01 | End: 2023-01-01

## 2023-01-01 RX ORDER — ALBUMIN, HUMAN INJ 5% 5 %
250 SOLUTION INTRAVENOUS ONCE
Status: DISCONTINUED | OUTPATIENT
Start: 2023-01-01 | End: 2023-01-01

## 2023-01-01 RX ORDER — MAGNESIUM SULFATE 1 G/100ML
1 INJECTION INTRAVENOUS EVERY 8 HOURS
Status: DISPENSED | OUTPATIENT
Start: 2023-01-01 | End: 2023-01-01

## 2023-01-01 RX ORDER — ACETAMINOPHEN 325 MG/1
650 TABLET ORAL EVERY 6 HOURS PRN
COMMUNITY

## 2023-01-01 RX ORDER — DIPHENOXYLATE HYDROCHLORIDE AND ATROPINE SULFATE 2.5; .025 MG/1; MG/1
1 TABLET ORAL
Status: DISCONTINUED | OUTPATIENT
Start: 2023-01-01 | End: 2023-01-01 | Stop reason: HOSPADM

## 2023-01-01 RX ORDER — ALBUMIN (HUMAN) 12.5 G/50ML
50 SOLUTION INTRAVENOUS ONCE
Status: DISCONTINUED | OUTPATIENT
Start: 2023-01-01 | End: 2023-01-01

## 2023-01-01 RX ORDER — LORAZEPAM 2 MG/ML
1 INJECTION INTRAMUSCULAR ONCE
Status: COMPLETED | OUTPATIENT
Start: 2023-01-01 | End: 2023-01-01

## 2023-01-01 RX ORDER — DIPHENHYDRAMINE HCL 25 MG
25 CAPSULE ORAL NIGHTLY PRN
Status: DISCONTINUED | OUTPATIENT
Start: 2023-01-01 | End: 2023-01-01

## 2023-01-01 RX ORDER — BISACODYL 10 MG
10 SUPPOSITORY, RECTAL RECTAL DAILY PRN
Status: DISCONTINUED | OUTPATIENT
Start: 2023-01-01 | End: 2023-01-01

## 2023-01-01 RX ORDER — IPRATROPIUM BROMIDE AND ALBUTEROL SULFATE 2.5; .5 MG/3ML; MG/3ML
3 SOLUTION RESPIRATORY (INHALATION) EVERY 4 HOURS PRN
Status: DISCONTINUED | OUTPATIENT
Start: 2023-01-01 | End: 2023-01-01

## 2023-01-01 RX ORDER — LORAZEPAM 2 MG/ML
1 INJECTION INTRAMUSCULAR ONCE
Status: DISCONTINUED | OUTPATIENT
Start: 2023-01-01 | End: 2023-01-01

## 2023-01-01 RX ORDER — HEPARIN SODIUM 10000 [USP'U]/100ML
12 INJECTION, SOLUTION INTRAVENOUS
Status: DISCONTINUED | OUTPATIENT
Start: 2023-01-01 | End: 2023-01-01

## 2023-01-01 RX ORDER — MORPHINE SULFATE 2 MG/ML
1 INJECTION, SOLUTION INTRAMUSCULAR; INTRAVENOUS EVERY 4 HOURS PRN
Status: DISCONTINUED | OUTPATIENT
Start: 2023-01-01 | End: 2023-01-01

## 2023-01-01 RX ORDER — HYDROCODONE BITARTRATE AND ACETAMINOPHEN 10; 325 MG/1; MG/1
1 TABLET ORAL EVERY 4 HOURS PRN
Status: DISCONTINUED | OUTPATIENT
Start: 2023-01-01 | End: 2023-01-01

## 2023-01-01 RX ORDER — ASPIRIN 325 MG
325 TABLET ORAL ONCE
Status: COMPLETED | OUTPATIENT
Start: 2023-01-01 | End: 2023-01-01

## 2023-01-01 RX ORDER — POLYETHYLENE GLYCOL 3350 17 G/17G
17 POWDER, FOR SOLUTION ORAL DAILY PRN
Status: DISCONTINUED | OUTPATIENT
Start: 2023-01-01 | End: 2023-01-01

## 2023-01-01 RX ORDER — ACETAMINOPHEN 325 MG/1
650 TABLET ORAL EVERY 4 HOURS
Status: DISCONTINUED | OUTPATIENT
Start: 2023-01-01 | End: 2023-01-01

## 2023-01-01 RX ORDER — MORPHINE SULFATE 20 MG/ML
10 SOLUTION ORAL
Status: DISCONTINUED | OUTPATIENT
Start: 2023-01-01 | End: 2023-01-01 | Stop reason: HOSPADM

## 2023-01-01 RX ORDER — ACETAMINOPHEN 10 MG/ML
1000 INJECTION, SOLUTION INTRAVENOUS EVERY 8 HOURS
Status: COMPLETED | OUTPATIENT
Start: 2023-01-01 | End: 2023-01-01

## 2023-01-01 RX ORDER — SODIUM CHLORIDE 9 MG/ML
125 INJECTION, SOLUTION INTRAVENOUS CONTINUOUS
Status: DISCONTINUED | OUTPATIENT
Start: 2023-01-01 | End: 2023-01-01

## 2023-01-01 RX ORDER — ACETAMINOPHEN 325 MG/1
650 TABLET ORAL EVERY 4 HOURS PRN
Status: DISCONTINUED | OUTPATIENT
Start: 2023-01-01 | End: 2023-01-01 | Stop reason: HOSPADM

## 2023-01-01 RX ORDER — CHLORHEXIDINE GLUCONATE 0.12 MG/ML
15 RINSE ORAL ONCE
Status: COMPLETED | OUTPATIENT
Start: 2023-01-01 | End: 2023-01-01

## 2023-01-01 RX ORDER — SODIUM CHLORIDE 9 MG/ML
INJECTION, SOLUTION INTRAVENOUS AS NEEDED
Status: DISCONTINUED | OUTPATIENT
Start: 2023-01-01 | End: 2023-01-01 | Stop reason: HOSPADM

## 2023-01-01 RX ORDER — SODIUM BICARBONATE 42 MG/ML
5 INJECTION, SOLUTION INTRAVENOUS ONCE
Status: DISCONTINUED | OUTPATIENT
Start: 2023-01-01 | End: 2023-01-01

## 2023-01-01 RX ORDER — ACETAMINOPHEN 650 MG/1
650 SUPPOSITORY RECTAL EVERY 4 HOURS
Status: DISCONTINUED | OUTPATIENT
Start: 2023-01-01 | End: 2023-01-01

## 2023-01-01 RX ORDER — IBUPROFEN 600 MG/1
1 TABLET ORAL
Status: DISCONTINUED | OUTPATIENT
Start: 2023-01-01 | End: 2023-01-01

## 2023-01-01 RX ADMIN — CALCIUM GLUCONATE 1000 MG: 20 INJECTION, SOLUTION INTRAVENOUS at 09:26

## 2023-01-01 RX ADMIN — DEXMEDETOMIDINE HYDROCHLORIDE 1.5 MCG/KG/HR: 4 INJECTION, SOLUTION INTRAVENOUS at 03:50

## 2023-01-01 RX ADMIN — SODIUM CHLORIDE, POTASSIUM CHLORIDE, SODIUM LACTATE AND CALCIUM CHLORIDE 50 ML/HR: 600; 310; 30; 20 INJECTION, SOLUTION INTRAVENOUS at 03:50

## 2023-01-01 RX ADMIN — MAGNESIUM SULFATE HEPTAHYDRATE 2 G: 2 INJECTION, SOLUTION INTRAVENOUS at 14:08

## 2023-01-01 RX ADMIN — ALBUMIN (HUMAN) 12.5 G: 12.5 INJECTION, SOLUTION INTRAVENOUS at 14:41

## 2023-01-01 RX ADMIN — Medication 10 ML: at 20:07

## 2023-01-01 RX ADMIN — ALBUMIN (HUMAN) 250 ML: 12.5 INJECTION, SOLUTION INTRAVENOUS at 22:27

## 2023-01-01 RX ADMIN — TRAMADOL HYDROCHLORIDE 25 MG: 50 TABLET, COATED ORAL at 14:27

## 2023-01-01 RX ADMIN — CALCIUM GLUCONATE 2000 MG: 20 INJECTION, SOLUTION INTRAVENOUS at 16:13

## 2023-01-01 RX ADMIN — Medication 50 MEQ: at 17:29

## 2023-01-01 RX ADMIN — HEPARIN SODIUM 12 UNITS/KG/HR: 10000 INJECTION, SOLUTION INTRAVENOUS at 03:51

## 2023-01-01 RX ADMIN — EPINEPHRINE 0.02 MCG/KG/MIN: 1 INJECTION INTRAMUSCULAR; INTRAVENOUS; SUBCUTANEOUS at 19:31

## 2023-01-01 RX ADMIN — SODIUM BICARBONATE 50 MEQ: 84 INJECTION, SOLUTION INTRAVENOUS at 15:24

## 2023-01-01 RX ADMIN — METOPROLOL TARTRATE 12.5 MG: 25 TABLET, FILM COATED ORAL at 05:19

## 2023-01-01 RX ADMIN — ACETAMINOPHEN 650 MG: 325 TABLET, FILM COATED ORAL at 18:27

## 2023-01-01 RX ADMIN — SODIUM CHLORIDE, POTASSIUM CHLORIDE, SODIUM LACTATE AND CALCIUM CHLORIDE 50 ML/HR: 600; 310; 30; 20 INJECTION, SOLUTION INTRAVENOUS at 19:03

## 2023-01-01 RX ADMIN — DEXMEDETOMIDINE HYDROCHLORIDE 0.2 MCG/KG/HR: 4 INJECTION, SOLUTION INTRAVENOUS at 22:55

## 2023-01-01 RX ADMIN — SODIUM BICARBONATE 650 MG: 650 TABLET ORAL at 16:11

## 2023-01-01 RX ADMIN — SODIUM BICARBONATE 50 MEQ: 84 INJECTION, SOLUTION INTRAVENOUS at 17:29

## 2023-01-01 RX ADMIN — VASOPRESSIN 0.04 UNITS/MIN: 0.2 INJECTION INTRAVENOUS at 23:44

## 2023-01-01 RX ADMIN — CHLORHEXIDINE GLUCONATE 15 ML: 1.2 RINSE ORAL at 20:30

## 2023-01-01 RX ADMIN — ONDANSETRON 4 MG: 2 INJECTION INTRAMUSCULAR; INTRAVENOUS at 11:43

## 2023-01-01 RX ADMIN — SODIUM CHLORIDE, POTASSIUM CHLORIDE, SODIUM LACTATE AND CALCIUM CHLORIDE 50 ML/HR: 600; 310; 30; 20 INJECTION, SOLUTION INTRAVENOUS at 20:50

## 2023-01-01 RX ADMIN — ALBUMIN (HUMAN) 12.5 G: 12.5 INJECTION, SOLUTION INTRAVENOUS at 16:07

## 2023-01-01 RX ADMIN — ONDANSETRON 4 MG: 2 INJECTION INTRAMUSCULAR; INTRAVENOUS at 19:15

## 2023-01-01 RX ADMIN — ATROPINE SULFATE: 0.1 INJECTION PARENTERAL at 22:30

## 2023-01-01 RX ADMIN — ALBUMIN (HUMAN) 250 ML: 12.5 INJECTION, SOLUTION INTRAVENOUS at 19:28

## 2023-01-01 RX ADMIN — MUPIROCIN: 20 OINTMENT TOPICAL at 22:27

## 2023-01-01 RX ADMIN — POTASSIUM CHLORIDE 20 MEQ: 29.8 INJECTION, SOLUTION INTRAVENOUS at 01:32

## 2023-01-01 RX ADMIN — Medication 10 ML: at 18:00

## 2023-01-01 RX ADMIN — CHLORHEXIDINE GLUCONATE 15 ML: 1.2 SOLUTION ORAL at 05:19

## 2023-01-01 RX ADMIN — METOPROLOL SUCCINATE 12.5 MG: 25 TABLET, EXTENDED RELEASE ORAL at 08:26

## 2023-01-01 RX ADMIN — MUPIROCIN: 20 OINTMENT TOPICAL at 09:26

## 2023-01-01 RX ADMIN — MAGNESIUM SULFATE IN DEXTROSE 1 G: 10 INJECTION, SOLUTION INTRAVENOUS at 00:22

## 2023-01-01 RX ADMIN — MAGNESIUM SULFATE IN DEXTROSE 1 G: 10 INJECTION, SOLUTION INTRAVENOUS at 06:32

## 2023-01-01 RX ADMIN — LORAZEPAM 1 MG: 2 INJECTION INTRAMUSCULAR at 22:55

## 2023-01-01 RX ADMIN — VASOPRESSIN 0.04 UNITS/MIN: 0.2 INJECTION INTRAVENOUS at 15:50

## 2023-01-01 RX ADMIN — SENNOSIDES AND DOCUSATE SODIUM 2 TABLET: 50; 8.6 TABLET ORAL at 20:58

## 2023-01-01 RX ADMIN — LORAZEPAM 1 MG: 2 INJECTION, SOLUTION INTRAMUSCULAR; INTRAVENOUS at 00:15

## 2023-01-01 RX ADMIN — ASPIRIN 325 MG ORAL TABLET 325 MG: 325 PILL ORAL at 20:30

## 2023-01-01 RX ADMIN — METOPROLOL SUCCINATE 25 MG: 25 TABLET, EXTENDED RELEASE ORAL at 09:27

## 2023-01-01 RX ADMIN — MAGNESIUM SULFATE HEPTAHYDRATE 2 G: 2 INJECTION, SOLUTION INTRAVENOUS at 13:37

## 2023-01-01 RX ADMIN — NITROGLYCERIN 1 INCH: 20 OINTMENT TOPICAL at 02:07

## 2023-01-01 RX ADMIN — ALBUMIN (HUMAN) 12.5 G: 12.5 INJECTION, SOLUTION INTRAVENOUS at 13:08

## 2023-01-01 RX ADMIN — METOPROLOL SUCCINATE 12.5 MG: 25 TABLET, EXTENDED RELEASE ORAL at 03:51

## 2023-01-01 RX ADMIN — ASPIRIN 81 MG CHEWABLE TABLET 81 MG: 81 TABLET CHEWABLE at 09:28

## 2023-01-01 RX ADMIN — ATORVASTATIN CALCIUM 80 MG: 40 TABLET, FILM COATED ORAL at 09:28

## 2023-01-01 RX ADMIN — TRAMADOL HYDROCHLORIDE 25 MG: 50 TABLET, COATED ORAL at 20:58

## 2023-01-01 RX ADMIN — SODIUM CHLORIDE 1000 ML: 9 INJECTION, SOLUTION INTRAVENOUS at 19:00

## 2023-01-01 RX ADMIN — MORPHINE SULFATE 4 MG: 4 INJECTION, SOLUTION INTRAMUSCULAR; INTRAVENOUS at 15:38

## 2023-01-01 RX ADMIN — MORPHINE SULFATE 1 MG: 4 INJECTION, SOLUTION INTRAMUSCULAR; INTRAVENOUS at 20:30

## 2023-01-01 RX ADMIN — MILRINONE LACTATE 0.38 MCG/KG/MIN: 0.2 INJECTION, SOLUTION INTRAVENOUS at 09:51

## 2023-01-01 RX ADMIN — LORAZEPAM 2 MG: 2 INJECTION INTRAMUSCULAR; INTRAVENOUS at 23:25

## 2023-01-01 RX ADMIN — CEFAZOLIN 2 G: 2 INJECTION, POWDER, FOR SOLUTION INTRAMUSCULAR; INTRAVENOUS at 02:32

## 2023-01-01 RX ADMIN — Medication 10 ML: at 21:33

## 2023-01-01 RX ADMIN — POTASSIUM CHLORIDE 20 MEQ: 29.8 INJECTION, SOLUTION INTRAVENOUS at 00:22

## 2023-01-01 RX ADMIN — DEXMEDETOMIDINE HYDROCHLORIDE 1.5 MCG/KG/HR: 4 INJECTION, SOLUTION INTRAVENOUS at 22:59

## 2023-01-01 RX ADMIN — CEFAZOLIN 2 G: 2 INJECTION, POWDER, FOR SOLUTION INTRAMUSCULAR; INTRAVENOUS at 19:50

## 2023-01-01 RX ADMIN — VASOPRESSIN 0.04 UNITS/MIN: 0.2 INJECTION INTRAVENOUS at 05:44

## 2023-01-01 RX ADMIN — LORAZEPAM 2 MG: 2 INJECTION INTRAMUSCULAR at 23:25

## 2023-01-01 RX ADMIN — ALBUMIN (HUMAN) 250 ML: 12.5 INJECTION, SOLUTION INTRAVENOUS at 22:45

## 2023-01-01 RX ADMIN — Medication 0.3 MCG/KG/MIN: at 22:45

## 2023-01-01 RX ADMIN — POTASSIUM CHLORIDE 20 MEQ: 29.8 INJECTION, SOLUTION INTRAVENOUS at 13:14

## 2023-01-01 RX ADMIN — LORAZEPAM 1 MG: 2 INJECTION INTRAMUSCULAR; INTRAVENOUS at 22:55

## 2023-01-01 RX ADMIN — CHLORHEXIDINE GLUCONATE 15 ML: 1.2 RINSE ORAL at 20:59

## 2023-01-01 RX ADMIN — CEFAZOLIN 2 G: 2 INJECTION, POWDER, FOR SOLUTION INTRAMUSCULAR; INTRAVENOUS at 11:12

## 2023-01-01 RX ADMIN — MUPIROCIN 1 APPLICATION: 20 OINTMENT TOPICAL at 19:33

## 2023-01-01 RX ADMIN — POTASSIUM CHLORIDE 20 MEQ: 29.8 INJECTION, SOLUTION INTRAVENOUS at 11:13

## 2023-01-01 RX ADMIN — AMIODARONE HYDROCHLORIDE 150 MG: 1.5 INJECTION, SOLUTION INTRAVENOUS at 22:15

## 2023-01-01 RX ADMIN — CEFAZOLIN 2 G: 2 INJECTION, POWDER, FOR SOLUTION INTRAMUSCULAR; INTRAVENOUS at 18:27

## 2023-01-01 RX ADMIN — PANTOPRAZOLE SODIUM 40 MG: 40 INJECTION, POWDER, LYOPHILIZED, FOR SOLUTION INTRAVENOUS at 17:39

## 2023-01-01 RX ADMIN — FUROSEMIDE 20 MG: 20 INJECTION, SOLUTION INTRAMUSCULAR; INTRAVENOUS at 01:31

## 2023-01-01 RX ADMIN — SODIUM BICARBONATE 50 MEQ: 84 INJECTION, SOLUTION INTRAVENOUS at 09:26

## 2023-01-01 RX ADMIN — MUPIROCIN 1 APPLICATION: 20 OINTMENT TOPICAL at 05:19

## 2023-01-01 RX ADMIN — ENOXAPARIN SODIUM 40 MG: 100 INJECTION SUBCUTANEOUS at 16:11

## 2023-01-01 RX ADMIN — POTASSIUM CHLORIDE 20 MEQ: 29.8 INJECTION, SOLUTION INTRAVENOUS at 20:59

## 2023-01-01 RX ADMIN — ASPIRIN 81 MG CHEWABLE TABLET 81 MG: 81 TABLET CHEWABLE at 03:51

## 2023-01-01 RX ADMIN — ATORVASTATIN CALCIUM 40 MG: 40 TABLET, FILM COATED ORAL at 20:58

## 2023-01-01 RX ADMIN — SODIUM CHLORIDE 125 ML/HR: 9 INJECTION, SOLUTION INTRAVENOUS at 03:35

## 2023-01-01 RX ADMIN — ATORVASTATIN CALCIUM 80 MG: 40 TABLET, FILM COATED ORAL at 03:51

## 2023-01-01 RX ADMIN — SODIUM BICARBONATE 650 MG: 650 TABLET ORAL at 20:58

## 2023-01-01 RX ADMIN — Medication 10 ML: at 08:28

## 2023-01-01 RX ADMIN — ALBUMIN (HUMAN) 250 ML: 12.5 INJECTION, SOLUTION INTRAVENOUS at 13:26

## 2023-01-01 RX ADMIN — VASOPRESSIN 0.04 UNITS/MIN: 0.2 INJECTION INTRAVENOUS at 22:10

## 2023-01-01 RX ADMIN — HEPARIN SODIUM 12 UNITS/KG/HR: 10000 INJECTION, SOLUTION INTRAVENOUS at 21:56

## 2023-01-01 RX ADMIN — Medication 10 ML: at 20:05

## 2023-01-01 RX ADMIN — ASPIRIN 81 MG: 81 TABLET, COATED ORAL at 09:26

## 2023-01-01 RX ADMIN — ALBUMIN (HUMAN) 12.5 G: 12.5 INJECTION, SOLUTION INTRAVENOUS at 15:17

## 2023-01-01 RX ADMIN — FUROSEMIDE 40 MG: 10 INJECTION, SOLUTION INTRAMUSCULAR; INTRAVENOUS at 16:59

## 2023-01-01 RX ADMIN — MORPHINE SULFATE 4 MG: 4 INJECTION INTRAVENOUS at 01:01

## 2023-01-01 RX ADMIN — HEPARIN SODIUM 16 UNITS/KG/HR: 10000 INJECTION, SOLUTION INTRAVENOUS at 12:08

## 2023-01-01 RX ADMIN — HYDROCODONE BITARTRATE AND ACETAMINOPHEN 1 TABLET: 10; 325 TABLET ORAL at 20:30

## 2023-01-01 RX ADMIN — KETOROLAC TROMETHAMINE 15 MG: 15 INJECTION, SOLUTION INTRAMUSCULAR; INTRAVENOUS at 16:27

## 2023-01-01 RX ADMIN — CHLORHEXIDINE GLUCONATE 15 ML: 1.2 RINSE ORAL at 09:26

## 2023-01-01 RX ADMIN — VASOPRESSIN 0.04 UNITS/MIN: 0.2 INJECTION INTRAVENOUS at 15:52

## 2023-01-01 RX ADMIN — POTASSIUM CHLORIDE 20 MEQ: 29.8 INJECTION, SOLUTION INTRAVENOUS at 16:08

## 2023-01-01 RX ADMIN — MUPIROCIN 1 APPLICATION: 20 OINTMENT TOPICAL at 21:11

## 2023-01-01 RX ADMIN — ACETAMINOPHEN 1000 MG: 1000 INJECTION INTRAVENOUS at 02:32

## 2023-01-01 RX ADMIN — ACETAMINOPHEN 1000 MG: 1000 INJECTION INTRAVENOUS at 20:57

## 2023-01-01 RX ADMIN — PROPOFOL INJECTABLE EMULSION 5 MCG/KG/MIN: 10 INJECTION, EMULSION INTRAVENOUS at 23:57

## 2023-01-01 RX ADMIN — CALCIUM GLUCONATE 2000 MG: 20 INJECTION, SOLUTION INTRAVENOUS at 06:32

## 2023-01-01 RX ADMIN — ACETAMINOPHEN 1000 MG: 1000 INJECTION INTRAVENOUS at 10:36

## 2023-01-01 RX ADMIN — POTASSIUM CHLORIDE 20 MEQ: 29.8 INJECTION INTRAVENOUS at 13:14

## 2023-01-01 RX ADMIN — SODIUM BICARBONATE 50 MEQ: 84 INJECTION, SOLUTION INTRAVENOUS at 13:26

## 2023-01-01 RX ADMIN — CALCIUM GLUCONATE 2000 MG: 20 INJECTION, SOLUTION INTRAVENOUS at 13:26

## 2023-01-01 RX ADMIN — SODIUM CHLORIDE 125 ML/HR: 4.5 INJECTION, SOLUTION INTRAVENOUS at 07:01

## 2023-01-01 RX ADMIN — SODIUM CHLORIDE 100 ML/HR: 9 INJECTION, SOLUTION INTRAVENOUS at 17:23

## 2023-01-01 RX ADMIN — ACETAMINOPHEN 1000 MG: 1000 INJECTION INTRAVENOUS at 17:56

## 2023-07-11 PROBLEM — I21.4 NSTEMI (NON-ST ELEVATED MYOCARDIAL INFARCTION): Status: ACTIVE | Noted: 2023-01-01

## 2023-07-11 PROBLEM — Z95.820 STATUS POST ANGIOPLASTY WITH STENT: Status: ACTIVE | Noted: 2023-01-01

## 2023-07-11 PROBLEM — I20.0 UNSTABLE ANGINA: Status: ACTIVE | Noted: 2023-01-01

## 2023-07-11 NOTE — SIGNIFICANT NOTE
07/11/23 1322   OTHER   Discipline occupational therapist   Rehab Time/Intention   Session Not Performed unable to evaluate, medical status change  (awaiting full cardiology workup and stabilization before safe to mobiolize with therapy.)   Recommendation   OT - Next Appointment 07/12/23        CHIEF COMPLAINT  Surgical Followup (excision of neck lipoma)      Date of Service: 04/19/2022      HISTORY OF PRESENT ILLNESS   Otilio Christianson is a 70 year old male who presents in the clinic today in follow up after excision of 5cm lipoma on right neck performed on 04/05/2022 with Dr. JUAN Rivera.     Patient feeling well.  Denies pain or incisional drainage. No concerns.    I have reviewed the patient's medications and allergies, past medical, surgical, social and family history, updating these as appropriate.  See Histories section of the EMR for a display of this information.    PHYSICAL EXAMINATION   Vitals:   Visit Vitals  /68   Pulse (!) 48   Temp 96.8 °F (36 °C) (Tympanic)   Resp 14   Ht 5' 11\" (1.803 m)   Wt 97 kg (213 lb 13.5 oz)   BMI 29.83 kg/m²     General: He is alert and oriented, in no acute distress.  Psych: normal affect, appropriate decision making  Respiratory:  Non-labored respirations  Incision: clean and dry, no erythema, mild swelling and ecchymosis  ASSESSMENT   1. Follow-up examination, following other surgery        PLAN   1. Pathology was reviewed with the patient which showed consistent with lipoma.    2. Answered all questions.  No need for further follow up.  Patient to call with any questions or concerns.  Patient verbalized understanding and agreement with plan of care.      Micheline WAGGONER, WCC, OMS

## 2023-07-11 NOTE — SIGNIFICANT NOTE
07/11/23 1424   OTHER   Discipline physical therapist   Rehab Time/Intention   Session Not Performed unable to evaluate, medical status change;other (see comments)  (non stemi w/ markedly elevated troponin; cardiac cath pending; pt not yet stable for PT.)   Recommendation   PT - Next Appointment 07/12/23

## 2023-07-11 NOTE — ED PROVIDER NOTES
Subjective   History of Present Illness  87-year-old female with history of previous cardiac stenting in 2011 complains of moderate substernal chest burning/pressure onset at rest at 7 PM accompanied by nausea and dyspnea.  Patient was given aspirin 324 mg p.o. by EMS and 1 sublingual nitroglycerin with significant improvement in pain.  Patient states pain now is negligible.  Patient did not follow-up with anyone after her stent and takes no medicine    Review of Systems   Respiratory:  Positive for shortness of breath.    Cardiovascular:  Positive for chest pain.   Gastrointestinal:  Positive for nausea.   All other systems reviewed and are negative.    No past medical history on file.    No Known Allergies    No past surgical history on file.    No family history on file.    Social History     Socioeconomic History    Marital status:            Objective   Physical Exam  Constitutional:       Appearance: Normal appearance. She is well-developed.   HENT:      Head: Normocephalic and atraumatic.      Mouth/Throat:      Mouth: Mucous membranes are moist.      Pharynx: Oropharynx is clear.   Eyes:      Conjunctiva/sclera: Conjunctivae normal.      Pupils: Pupils are equal, round, and reactive to light.   Cardiovascular:      Rate and Rhythm: Normal rate and regular rhythm.      Heart sounds: Murmur heard.   Pulmonary:      Effort: Pulmonary effort is normal.      Breath sounds: Normal breath sounds.   Abdominal:      General: Bowel sounds are normal. There is no distension.      Palpations: Abdomen is soft.      Tenderness: There is no abdominal tenderness.   Musculoskeletal:         General: Normal range of motion.   Skin:     General: Skin is warm and dry.      Capillary Refill: Capillary refill takes less than 2 seconds.   Neurological:      General: No focal deficit present.      Mental Status: She is alert and oriented to person, place, and time.   Psychiatric:         Mood and Affect: Mood normal.          Behavior: Behavior normal.       Procedures           ED Course                                           Medical Decision Making  87-year-old female pain-free on reevaluation with non-STEMI.  Patient counseled to notify staff if she has any recurrence of her chest pain.  Will place on IV heparin, patient has Nitropaste, and cardiac consultation, case discussed Dr. Galicia agrees with plan.    Problems Addressed:  NSTEMI (non-ST elevated myocardial infarction): complicated acute illness or injury    Amount and/or Complexity of Data Reviewed  Labs: ordered.     Details: Initial troponin 179  Radiology: ordered.  ECG/medicine tests: ordered.     Details: EKG interpretation: Normal sinus rhythm, rate 97, no STEMI seen    Risk  Prescription drug management.  Decision regarding hospitalization.        Final diagnoses:   NSTEMI (non-ST elevated myocardial infarction)       ED Disposition  ED Disposition       ED Disposition   Decision to Admit    Condition   --    Comment   Level of Care: Telemetry [5]   Admitting Physician: ADDIS GALICIA [[id number]]   Bed Request Comments: PCU                 No follow-up provider specified.       Medication List      No changes were made to your prescriptions during this visit.            Fred Jackson MD  07/11/23 0234

## 2023-07-11 NOTE — PROGRESS NOTES
[facility] note  87-year-old female presents with chest pain and non-ST elevation MI.  Troponin were mildly elevated.  Known coronary artery disease as PCI in 2011.  Cardiac cath revealed three-vessel coronary artery disease amenable for bypass.  I had discussed the case with Dr. Vizcarra and we will complete the recommendation tomorrow possible CABG

## 2023-07-11 NOTE — CASE MANAGEMENT/SOCIAL WORK
Discharge Planning Assessment   Jr     Patient Name: Dary Fabian  MRN: [id number]  Today's Date: 7/11/2023    Admit Date: 7/11/2023    Plan: DC Plan: Anticipate Routine Home with daughter. Watch for AC needs.   Discharge Needs Assessment       Row Name 07/11/23 1402       Living Environment    People in Home child(tevin), adult    Name(s) of People in Home Lives with José Manuel Pinto    Current Living Arrangements apartment    Potentially Unsafe Housing Conditions none    Primary Care Provided by self    Provides Primary Care For no one    Family Caregiver if Needed child(tevin), adult    Family Caregiver Names Daughter Millie    Quality of Family Relationships helpful;involved;supportive    Able to Return to Prior Arrangements yes       Resource/Environmental Concerns    Resource/Environmental Concerns none    Transportation Concerns none       Food Insecurity    Within the past 12 months, you worried that your food would run out before you got the money to buy more. Never true    Within the past 12 months, the food you bought just didn't last and you didn't have money to get more. Never true       Transition Planning    Patient/Family Anticipates Transition to home with family    Patient/Family Anticipated Services at Transition none    Transportation Anticipated family or friend will provide       Discharge Needs Assessment    Readmission Within the Last 30 Days no previous admission in last 30 days    Equipment Currently Used at Home wheelchair;rollator;shower chair    Concerns to be Addressed discharge planning    Anticipated Changes Related to Illness none    Equipment Needed After Discharge other (see comments)  CM will continue to monitor for needs    Provided Post Acute Provider List? N/A    Provided Post Acute Provider Quality & Resource List? N/A                   Discharge Plan       Row Name 07/11/23 1404       Plan    Plan DC Plan: Anticipate Routine Home with daughter. Watch for AC needs.     Patient/Family in Agreement with Plan yes    Provided Post Acute Provider List? N/A    Provided Post Acute Provider Quality & Resource List? N/A    Plan Comments CM spoke with patient at bedside to discuss admission assessment and discharge planning. Patient confirms pharmacy. Patient states she does not have a PCP but does not want assistance to obtain one and would prefer to choose her own. Patient confirms she is agreeable to enrolling in meds to bed program. CM enrolled patient and updated pharmacy in [employer]. Patient denies any difficulty affording medications at this time. Patient denies any additional needs for services or DME at this time. Patient daughter asked if there was a Medicare book that offered free services. CM advised patient and daughter to contact Medicare  to inquire about benefits that may be available as this CM does not have those resources. Patient's daughter will drive her home when ready for DC.CM reviewed chart documentation to obtain clinical updates. Plan for Cardiac Cath today 7/11/23. CM will continue to follow for any further needs and adjust discharge plan accordingly. DC Barriers: Pending procedure, pending echocardiogram, and watch for AC plan.                  Continued Care and Services - Admitted Since 7/11/2023    Coordination has not been started for this encounter.       Expected Discharge Date and Time       Expected Discharge Date Expected Discharge Time    Jul 12, 2023            Demographic Summary       Row Name 07/11/23 1401       General Information    Admission Type inpatient    Arrived From emergency department;home    Required Notices Provided Important Message from Medicare    Referral Source admission list    Reason for Consult discharge planning    Preferred Language English       Contact Information    Permission Granted to Share Info With                    Functional Status       Row Name 07/11/23 1402       Functional Status    Usual  Activity Tolerance good    Current Activity Tolerance good       Physical Activity    On average, how many days per week do you engage in moderate to strenuous exercise (like a brisk walk)? 0 days    On average, how many minutes do you engage in exercise at this level? 0 min    Number of minutes of exercise per week 0       Functional Status, IADL    Medications independent    Meal Preparation independent    Housekeeping independent    Laundry independent    Shopping independent       Mental Status    General Appearance WDL WDL       Mental Status Summary    Recent Changes in Mental Status/Cognitive Functioning no changes       Employment/    Employment Status retired    Current or Previous Occupation not applicable           Current or Previous  Service none                Tiffanie Teresa RN     Office Phone: [phone]  Office Cell:     [phone]

## 2023-07-11 NOTE — H&P (VIEW-ONLY)
Referring Provider: Ventura William MD  Reason for Consultation: NSTEMI vs ACS    Patient Care Team:  Provider, No Known as PCP - General    Chief complaint: chest pain    Subjective .     History of present illness:  Dary Fabian is a 87 y.o. female with a history of CAD status post stent 2011 and hypertension who presents with chest pain.  Patient states she began having chest pain yesterday while sitting in her chair reading.  She describes it as a burning sensation with no radiation to the arms or jaws.  She also has associated nausea but denies vomiting.  She reports that the symptoms feel similar to the previous MI she had in 2011 that required a stent.  She denies any current chest pain, shortness of breath, dizziness or lightheadedness.  She states Dr. Matta used to be her cardiologist but she has not seen him in quite some time.    She was started on a heparin drip for elevated troponin of 179 and 285.  EKG shows normal sinus rhythm.  Telemetry reveals sinus rhythm occasionally transitioning into A-fib.    ROS  + Chest pain, nausea    Since I have last seen, the patient has been without any chest discomfort, shortness of breath, palpitations, dizziness or syncope.  Denies having any headache, abdominal pain, vomiting, diarrhea, constipation, loss of weight or loss of appetite.  Denies having any excessive bruising, hematuria or blood in the stool.    Review of all systems negative except as indicated      History  History reviewed. No pertinent past medical history.    No past surgical history on file.    History reviewed. No pertinent family history.          Medications Prior to Admission   Medication Sig Dispense Refill Last Dose    acetaminophen (TYLENOL) 325 MG tablet Take 2 tablets by mouth Every 6 (Six) Hours As Needed for Mild Pain.            Patient has no known allergies.    Scheduled Meds:aspirin, 81 mg, Oral, Daily  atorvastatin, 80 mg, Oral, Daily  [START ON 7/12/2023] metoprolol  "succinate XL, 25 mg, Oral, Q24H  sodium chloride, 10 mL, Intravenous, Q12H      Continuous Infusions:heparin, 12 Units/kg/hr, Last Rate: 13 Units/kg/hr (07/11/23 1148)  lactated ringers, 50 mL/hr, Last Rate: 50 mL/hr (07/11/23 0350)      PRN Meds:.  Calcium Replacement - Follow Nurse / BPA Driven Protocol    heparin    heparin    Magnesium Standard Dose Replacement - Follow Nurse / BPA Driven Protocol    Morphine    nitroglycerin    ondansetron **OR** ondansetron    Phosphorus Replacement - Follow Nurse / BPA Driven Protocol    Potassium Replacement - Follow Nurse / BPA Driven Protocol    sodium chloride    sodium chloride    Objective     VITAL SIGNS  Vitals:    07/11/23 0714 07/11/23 0733 07/11/23 0826 07/11/23 1124   BP: 157/88 157/88 138/80 141/75   BP Location: Left arm   Left arm   Patient Position: Lying   Lying   Pulse: 98  69 69   Resp: 27   15   Temp: 98.1 °F (36.7 °C)   98.7 °F (37.1 °C)   TempSrc: Oral   Oral   SpO2: 96%   98%   Weight:  56.7 kg (125 lb)     Height:  152.4 cm (60\")         Flowsheet Rows      Flowsheet Row First Filed Value   Admission Height 152.4 cm (60\") Documented at 07/11/2023 0053   Admission Weight 56.7 kg (125 lb) Documented at 07/11/2023 0053              Intake/Output Summary (Last 24 hours) at 7/11/2023 1208  Last data filed at 7/11/2023 1124  Gross per 24 hour   Intake 0 ml   Output --   Net 0 ml        TELEMETRY: Sinus rhythm    Physical Exam:  The patient is alert, oriented and in no distress.  Vital signs as noted above.  Head and neck revealed no carotid bruits or jugular venous distention.  No thyromegaly or lymphadenopathy is present  Lungs clear.  No wheezing.  Breath sounds are normal bilaterally.  Heart normal first and second heart sounds.  Positive murmur at right sternal border.  No precordial rub is present.  No gallop is present.  Abdomen soft and nontender.  No organomegaly is present.  Extremities with good peripheral pulses without any pedal edema.  Skin warm " and dry.  Musculoskeletal system is grossly normal  CNS grossly normal    Reviewed and updated.  Results Review:   I reviewed the patient's new clinical results.  Lab Results (last 24 hours)       Procedure Component Value Units Date/Time    aPTT [[id number]]  (Abnormal) Collected: 07/11/23 0959    Specimen: Blood Updated: 07/11/23 1044     PTT 54.4 seconds     High Sensitivity Troponin T 2Hr [[id number]]  (Abnormal) Collected: 07/11/23 0422    Specimen: Blood Updated: 07/11/23 0529     HS Troponin T 285 ng/L      Troponin T Delta 106 ng/L     Narrative:      High Sensitive Troponin T Reference Range:  <10.0 ng/L- Negative Female for AMI  <15.0 ng/L- Negative Male for AMI  >=10 - Abnormal Female indicating possible myocardial injury.  >=15 - Abnormal Male indicating possible myocardial injury.   Clinicians would have to utilize clinical acumen, EKG, Troponin, and serial changes to determine if it is an Acute Myocardial Infarction or myocardial injury due to an underlying chronic condition.         Calcium, Ionized [[id number]]  (Abnormal) Collected: 07/11/23 0422    Specimen: Blood Updated: 07/11/23 0526     Ionized Calcium 1.15 mmol/L     Basic Metabolic Panel [[id number]]  (Abnormal) Collected: 07/11/23 0422    Specimen: Blood Updated: 07/11/23 0458     Glucose 133 mg/dL      BUN 32 mg/dL      Creatinine 0.80 mg/dL      Sodium 140 mmol/L      Potassium 4.3 mmol/L      Comment: Slight hemolysis detected by analyzer. Results may be affected.        Chloride 106 mmol/L      CO2 22.0 mmol/L      Calcium 8.5 mg/dL      BUN/Creatinine Ratio 40.0     Anion Gap 12.0 mmol/L      eGFR 71.4 mL/min/1.73     Narrative:      GFR Normal >60  Chronic Kidney Disease <60  Kidney Failure <15    The GFR formula is only valid for adults with stable renal function between ages 18 and 70.    Hemoglobin A1c [[id number]]  (Abnormal) Collected: 07/11/23 0422    Specimen: Blood Updated: 07/11/23 0456     Hemoglobin A1C 5.80 %     CBC (No Diff)  [[id number]]  (Normal) Collected: 07/11/23 0422    Specimen: Blood Updated: 07/11/23 0428     WBC 10.10 10*3/mm3      RBC 4.38 10*6/mm3      Hemoglobin 13.1 g/dL      Hematocrit 40.2 %      MCV 91.8 fL      MCH 29.8 pg      MCHC 32.5 g/dL      RDW 13.1 %      RDW-SD 42.0 fl      MPV 7.6 fL      Platelets 215 10*3/mm3     TSH [[id number]]  (Normal) Collected: 07/11/23 0128    Specimen: Blood Updated: 07/11/23 0410     TSH 2.670 uIU/mL     Lipid Panel [[id number]]  (Abnormal) Collected: 07/11/23 0128    Specimen: Blood Updated: 07/11/23 0405     Total Cholesterol 194 mg/dL      Triglycerides 95 mg/dL      HDL Cholesterol 54 mg/dL      LDL Cholesterol  123 mg/dL      VLDL Cholesterol 17 mg/dL      LDL/HDL Ratio 2.24    Narrative:      Cholesterol Reference Ranges  (U.S. Department of Health and Human Services ATP III Classifications)    Desirable          <200 mg/dL  Borderline High    200-239 mg/dL  High Risk          >240 mg/dL      Triglyceride Reference Ranges  (U.S. Department of Health and Human Services ATP III Classifications)    Normal           <150 mg/dL  Borderline High  150-199 mg/dL  High             200-499 mg/dL  Very High        >500 mg/dL    HDL Reference Ranges  (U.S. Department of Health and Human Services ATP III Classifications)    Low     <40 mg/dl (major risk factor for CHD)  High    >60 mg/dl ('negative' risk factor for CHD)        LDL Reference Ranges  (U.S. Department of Health and Human Services ATP III Classifications)    Optimal          <100 mg/dL  Near Optimal     100-129 mg/dL  Borderline High  130-159 mg/dL  High             160-189 mg/dL  Very High        >189 mg/dL    Magnesium [[id number]]  (Normal) Collected: 07/11/23 0128    Specimen: Blood Updated: 07/11/23 0405     Magnesium 1.8 mg/dL     High Sensitivity Troponin T [[id number]]  (Abnormal) Collected: 07/11/23 0128    Specimen: Blood Updated: 07/11/23 0204     HS Troponin T 179 ng/L     Narrative:      High Sensitive Troponin T  Reference Range:  <10.0 ng/L- Negative Female for AMI  <15.0 ng/L- Negative Male for AMI  >=10 - Abnormal Female indicating possible myocardial injury.  >=15 - Abnormal Male indicating possible myocardial injury.   Clinicians would have to utilize clinical acumen, EKG, Troponin, and serial changes to determine if it is an Acute Myocardial Infarction or myocardial injury due to an underlying chronic condition.         Comprehensive Metabolic Panel [[id number]]  (Abnormal) Collected: 07/11/23 0128    Specimen: Blood Updated: 07/11/23 0203     Glucose 152 mg/dL      BUN 36 mg/dL      Creatinine 0.86 mg/dL      Sodium 140 mmol/L      Potassium 4.2 mmol/L      Chloride 106 mmol/L      CO2 22.0 mmol/L      Calcium 8.4 mg/dL      Total Protein 5.7 g/dL      Albumin 3.4 g/dL      ALT (SGPT) 11 U/L      AST (SGOT) 23 U/L      Alkaline Phosphatase 66 U/L      Total Bilirubin 0.2 mg/dL      Globulin 2.3 gm/dL      A/G Ratio 1.5 g/dL      BUN/Creatinine Ratio 41.9     Anion Gap 12.0 mmol/L      eGFR 65.5 mL/min/1.73     Narrative:      GFR Normal >60  Chronic Kidney Disease <60  Kidney Failure <15    The GFR formula is only valid for adults with stable renal function between ages 18 and 70.    Protime-INR [[id number]]  (Normal) Collected: 07/11/23 0128    Specimen: Blood Updated: 07/11/23 0145     Protime 10.4 Seconds      INR 0.97    aPTT [[id number]]  (Abnormal) Collected: 07/11/23 0128    Specimen: Blood Updated: 07/11/23 0145     PTT 24.2 seconds     D-dimer, Quantitative [[id number]]  (Normal) Collected: 07/11/23 0128    Specimen: Blood Updated: 07/11/23 0145     D-Dimer, Quantitative 0.77 mg/L (FEU)     Narrative:      According to the assay 's published package insert, a normal (<0.50 mg/L (FEU)) D-dimer result in conjunction with a non-high clinical probability assessment, excludes deep vein thrombosis (DVT) and pulmonary embolism (PE) with high sensitivity.    D-dimer values increase with age and this can make  "VTE exclusion of an older population difficult. To address this, the [employer], based on best available evidence and recent guidelines, recommends that clinicians use age-adjusted D-dimer thresholds in patients greater than 50 years of age with: a) a low probability of PE who do not meet all Pulmonary Embolism Rule Out Criteria, or b) in those with intermediate probability of PE.   The formula for an age-adjusted D-dimer cut-off is \"age/100\".  For example, a 60 year old patient would have an age-adjusted cut-off of 0.60 mg/L (FEU) and an 80 year old 0.80 mg/L (FEU).    CBC & Differential [[id number]]  (Abnormal) Collected: 07/11/23 0128    Specimen: Blood Updated: 07/11/23 0133    Narrative:      The following orders were created for panel order CBC & Differential.  Procedure                               Abnormality         Status                     ---------                               -----------         ------                     CBC Auto Differential[[id number]]        Abnormal            Final result                 Please view results for these tests on the individual orders.    CBC Auto Differential [[id number]]  (Abnormal) Collected: 07/11/23 0128    Specimen: Blood Updated: 07/11/23 0133     WBC 9.40 10*3/mm3      RBC 4.31 10*6/mm3      Hemoglobin 12.8 g/dL      Hematocrit 38.8 %      MCV 89.9 fL      MCH 29.8 pg      MCHC 33.1 g/dL      RDW 13.1 %      RDW-SD 43.8 fl      MPV 7.7 fL      Platelets 201 10*3/mm3      Neutrophil % 78.3 %      Lymphocyte % 13.4 %      Monocyte % 7.0 %      Eosinophil % 1.1 %      Basophil % 0.2 %      Neutrophils, Absolute 7.40 10*3/mm3      Lymphocytes, Absolute 1.30 10*3/mm3      Monocytes, Absolute 0.70 10*3/mm3      Eosinophils, Absolute 0.10 10*3/mm3      Basophils, Absolute 0.00 10*3/mm3      nRBC 0.0 /100 WBC             Imaging Results (Last 24 Hours)       Procedure Component Value Units Date/Time    XR Chest 1 View [[id number]] Collected: " 07/11/23 0708     Updated: 07/11/23 0711    Narrative:      XR CHEST 1 VW    Date of Exam: 7/11/2023 1:32 AM EDT    Indication: cp    Comparison: 12/28/2010  Findings:  Heart and pulmonary vessels appear within normal limits. Lung fields are somewhat hyperinflated. There are no acute lung infiltrates or effusions.      Impression:      Impression:  Mild hyperinflation.      Electronically Signed: Alyssa Beard MD    7/11/2023 7:08 AM EDT    Workstation ID: [id number]        LAB RESULTS (LAST 7 DAYS)    CBC  Results from last 7 days   Lab Units 07/11/23  0422 07/11/23  0128   WBC 10*3/mm3 10.10 9.40   RBC 10*6/mm3 4.38 4.31   HEMOGLOBIN g/dL 13.1 12.8   HEMATOCRIT % 40.2 38.8   MCV fL 91.8 89.9   PLATELETS 10*3/mm3 215 201       BMP  Results from last 7 days   Lab Units 07/11/23  0422 07/11/23  0128   SODIUM mmol/L 140 140   POTASSIUM mmol/L 4.3 4.2   CHLORIDE mmol/L 106 106   CO2 mmol/L 22.0 22.0   BUN mg/dL 32* 36*   CREATININE mg/dL 0.80 0.86   GLUCOSE mg/dL 133* 152*   MAGNESIUM mg/dL  --  1.8       CMP   Results from last 7 days   Lab Units 07/11/23  0422 07/11/23  0128   SODIUM mmol/L 140 140   POTASSIUM mmol/L 4.3 4.2   CHLORIDE mmol/L 106 106   CO2 mmol/L 22.0 22.0   BUN mg/dL 32* 36*   CREATININE mg/dL 0.80 0.86   GLUCOSE mg/dL 133* 152*   ALBUMIN g/dL  --  3.4*   BILIRUBIN mg/dL  --  0.2   ALK PHOS U/L  --  66   AST (SGOT) U/L  --  23   ALT (SGPT) U/L  --  11         BNP        TROPONIN  Results from last 7 days   Lab Units 07/11/23 0422   HSTROP T ng/L 285*       CoAg  Results from last 7 days   Lab Units 07/11/23  0959 07/11/23  0128   INR   --  0.97   APTT seconds 54.4* 24.2*       Creatinine Clearance  Estimated Creatinine Clearance: 39.1 mL/min (by C-G formula based on SCr of 0.8 mg/dL).    ABG        Radiology  XR Chest 1 View    Result Date: 7/11/2023  Impression: Mild hyperinflation. Electronically Signed: Alyssa Beard MD  7/11/2023 7:08 AM EDT  Workstation ID: [id number]       EKG      I  personally viewed and interpreted the patient's EKG/Telemetry data: Sinus rhythm and ST-T wave abnormalities.    ECHOCARDIOGRAM:        STRESS TEST        Cardiolite (Tc-99m sestamibi) stress test    HEART CATHETERIZATION  No results found for this or any previous visit.      OTHER:     Assessment & Plan     Principal Problem:    NSTEMI (non-ST elevated myocardial infarction)     Dary Fabian is a 87 y.o. female with a history of CAD status post stent 2011 and hypertension who presents with chest pain.  Patient states she began having chest pain yesterday while sitting in her chair reading.  She describes it as a burning sensation with no radiation to the arms or jaws.  She also has associated nausea but denies vomiting.  She reports that the symptoms feel similar to the previous MI she had in 2011 that required a stent.  She denies any current chest pain, shortness of breath, dizziness or lightheadedness.  She states Dr. Matta used to be her cardiologist but she has not seen him in quite some time.     She was started on a heparin drip for elevated troponin of 179 and 285.  EKG shows normal sinus rhythm.  Telemetry reveals sinus rhythm occasionally transitioning into A-fib.    CAD status post PCI 2011  Hypertension      Patient presents with typical chest pain symptoms  High-sensitivity troponin 179, repeat 285  EKG shows normal sinus rhythm, rate 97  Telemetry reveals sinus rhythm with occasional conversion to A-fib  Patient on heparin drip  History of CAD status post PCI in 2011 with Dr. Matta  History of hypertension  Patient denies taking any home medications except Tylenol  No known allergies  Echocardiogram pending  Patient n.p.o. for potential left heart catheterization    Further plan and recommendations per Dr. Vizcarra.        ARIES Landon  07/11/23  12:08 EDT  Electronically signed by ARIES Landon, 07/11/23, 12:20 PM EDT.    The patient was seen around 12:30 PM today.  Chart  was reviewed in entirety history and physical and face-to-face evaluation was conducted.  Majority of the MDM was performed by me.  Reviewed and agree with the assessment and plan as documented by nurse practitioner Olive Garcia.  Patient has history of hypertension  Status post stent 2011 by Dr. [facility]no [facility].  Patient presented with unstable angina symptoms.  Elevated troponin level with trending up.  Patient to have cardiac catheterization and coronary arteriography.  Risks and benefits pros and cons of the procedure including infection bleeding blood clot heart attack stroke allergic reaction were discussed.  Echocardiogram will be reviewed.  Further plan will depend on patient's progress.  ]]]]]]]]]]]]]]]]]]]]  Electronically signed by Yoana Vizcarra MD, 07/11/23, 1:07 PM EDT.

## 2023-07-11 NOTE — Clinical Note
Level of Care: Telemetry [5]   Diagnosis: NSTEMI (non-ST elevated myocardial infarction) [152849]   Admitting Physician: ADDIS GALICIA [[id number]]   Attending Physician: ADDIS GALICIA [[id number]]

## 2023-07-11 NOTE — PROGRESS NOTES
A 87 y.o. female with PMH of CAD, hypertension presented to the hospital for chest pain and was admitted with a principal diagnosis of NSTEMI (non-ST elevated myocardial infarction).  Cardiology was consulted, treated with heparin drip.  Troponin peaked at 285.  Also increased metoprolol dose due to poorly controlled blood pressure.  Awaiting echocardiogram.    At this time, patient denies any symptoms.  Wants to go home.    Dr. Ventura William MD MPH  Hospitalist  07/11/23   08:00 EDT

## 2023-07-11 NOTE — CONSULTS
Referring Provider: Ventura William MD  Reason for Consultation: NSTEMI vs ACS    Patient Care Team:  Provider, No Known as PCP - General    Chief complaint: chest pain    Subjective .     History of present illness:  Dary Fabian is a 87 y.o. female with a history of CAD status post stent 2011 and hypertension who presents with chest pain.  Patient states she began having chest pain yesterday while sitting in her chair reading.  She describes it as a burning sensation with no radiation to the arms or jaws.  She also has associated nausea but denies vomiting.  She reports that the symptoms feel similar to the previous MI she had in 2011 that required a stent.  She denies any current chest pain, shortness of breath, dizziness or lightheadedness.  She states Dr. Matta used to be her cardiologist but she has not seen him in quite some time.    She was started on a heparin drip for elevated troponin of 179 and 285.  EKG shows normal sinus rhythm.  Telemetry reveals sinus rhythm occasionally transitioning into A-fib.    ROS  + Chest pain, nausea    Since I have last seen, the patient has been without any chest discomfort, shortness of breath, palpitations, dizziness or syncope.  Denies having any headache, abdominal pain, vomiting, diarrhea, constipation, loss of weight or loss of appetite.  Denies having any excessive bruising, hematuria or blood in the stool.    Review of all systems negative except as indicated      History  History reviewed. No pertinent past medical history.    No past surgical history on file.    History reviewed. No pertinent family history.          Medications Prior to Admission   Medication Sig Dispense Refill Last Dose    acetaminophen (TYLENOL) 325 MG tablet Take 2 tablets by mouth Every 6 (Six) Hours As Needed for Mild Pain.            Patient has no known allergies.    Scheduled Meds:aspirin, 81 mg, Oral, Daily  atorvastatin, 80 mg, Oral, Daily  [START ON 7/12/2023] metoprolol  "succinate XL, 25 mg, Oral, Q24H  sodium chloride, 10 mL, Intravenous, Q12H      Continuous Infusions:heparin, 12 Units/kg/hr, Last Rate: 13 Units/kg/hr (07/11/23 1148)  lactated ringers, 50 mL/hr, Last Rate: 50 mL/hr (07/11/23 0350)      PRN Meds:.  Calcium Replacement - Follow Nurse / BPA Driven Protocol    heparin    heparin    Magnesium Standard Dose Replacement - Follow Nurse / BPA Driven Protocol    Morphine    nitroglycerin    ondansetron **OR** ondansetron    Phosphorus Replacement - Follow Nurse / BPA Driven Protocol    Potassium Replacement - Follow Nurse / BPA Driven Protocol    sodium chloride    sodium chloride    Objective     VITAL SIGNS  Vitals:    07/11/23 0714 07/11/23 0733 07/11/23 0826 07/11/23 1124   BP: 157/88 157/88 138/80 141/75   BP Location: Left arm   Left arm   Patient Position: Lying   Lying   Pulse: 98  69 69   Resp: 27   15   Temp: 98.1 °F (36.7 °C)   98.7 °F (37.1 °C)   TempSrc: Oral   Oral   SpO2: 96%   98%   Weight:  56.7 kg (125 lb)     Height:  152.4 cm (60\")         Flowsheet Rows      Flowsheet Row First Filed Value   Admission Height 152.4 cm (60\") Documented at 07/11/2023 0053   Admission Weight 56.7 kg (125 lb) Documented at 07/11/2023 0053              Intake/Output Summary (Last 24 hours) at 7/11/2023 1208  Last data filed at 7/11/2023 1124  Gross per 24 hour   Intake 0 ml   Output --   Net 0 ml        TELEMETRY: Sinus rhythm    Physical Exam:  The patient is alert, oriented and in no distress.  Vital signs as noted above.  Head and neck revealed no carotid bruits or jugular venous distention.  No thyromegaly or lymphadenopathy is present  Lungs clear.  No wheezing.  Breath sounds are normal bilaterally.  Heart normal first and second heart sounds.  Positive murmur at right sternal border.  No precordial rub is present.  No gallop is present.  Abdomen soft and nontender.  No organomegaly is present.  Extremities with good peripheral pulses without any pedal edema.  Skin warm " and dry.  Musculoskeletal system is grossly normal  CNS grossly normal    Reviewed and updated.  Results Review:   I reviewed the patient's new clinical results.  Lab Results (last 24 hours)       Procedure Component Value Units Date/Time    aPTT [[id number]]  (Abnormal) Collected: 07/11/23 0959    Specimen: Blood Updated: 07/11/23 1044     PTT 54.4 seconds     High Sensitivity Troponin T 2Hr [[id number]]  (Abnormal) Collected: 07/11/23 0422    Specimen: Blood Updated: 07/11/23 0529     HS Troponin T 285 ng/L      Troponin T Delta 106 ng/L     Narrative:      High Sensitive Troponin T Reference Range:  <10.0 ng/L- Negative Female for AMI  <15.0 ng/L- Negative Male for AMI  >=10 - Abnormal Female indicating possible myocardial injury.  >=15 - Abnormal Male indicating possible myocardial injury.   Clinicians would have to utilize clinical acumen, EKG, Troponin, and serial changes to determine if it is an Acute Myocardial Infarction or myocardial injury due to an underlying chronic condition.         Calcium, Ionized [[id number]]  (Abnormal) Collected: 07/11/23 0422    Specimen: Blood Updated: 07/11/23 0526     Ionized Calcium 1.15 mmol/L     Basic Metabolic Panel [[id number]]  (Abnormal) Collected: 07/11/23 0422    Specimen: Blood Updated: 07/11/23 0458     Glucose 133 mg/dL      BUN 32 mg/dL      Creatinine 0.80 mg/dL      Sodium 140 mmol/L      Potassium 4.3 mmol/L      Comment: Slight hemolysis detected by analyzer. Results may be affected.        Chloride 106 mmol/L      CO2 22.0 mmol/L      Calcium 8.5 mg/dL      BUN/Creatinine Ratio 40.0     Anion Gap 12.0 mmol/L      eGFR 71.4 mL/min/1.73     Narrative:      GFR Normal >60  Chronic Kidney Disease <60  Kidney Failure <15    The GFR formula is only valid for adults with stable renal function between ages 18 and 70.    Hemoglobin A1c [[id number]]  (Abnormal) Collected: 07/11/23 0422    Specimen: Blood Updated: 07/11/23 0456     Hemoglobin A1C 5.80 %     CBC (No Diff)  [[id number]]  (Normal) Collected: 07/11/23 0422    Specimen: Blood Updated: 07/11/23 0428     WBC 10.10 10*3/mm3      RBC 4.38 10*6/mm3      Hemoglobin 13.1 g/dL      Hematocrit 40.2 %      MCV 91.8 fL      MCH 29.8 pg      MCHC 32.5 g/dL      RDW 13.1 %      RDW-SD 42.0 fl      MPV 7.6 fL      Platelets 215 10*3/mm3     TSH [[id number]]  (Normal) Collected: 07/11/23 0128    Specimen: Blood Updated: 07/11/23 0410     TSH 2.670 uIU/mL     Lipid Panel [[id number]]  (Abnormal) Collected: 07/11/23 0128    Specimen: Blood Updated: 07/11/23 0405     Total Cholesterol 194 mg/dL      Triglycerides 95 mg/dL      HDL Cholesterol 54 mg/dL      LDL Cholesterol  123 mg/dL      VLDL Cholesterol 17 mg/dL      LDL/HDL Ratio 2.24    Narrative:      Cholesterol Reference Ranges  (U.S. Department of Health and Human Services ATP III Classifications)    Desirable          <200 mg/dL  Borderline High    200-239 mg/dL  High Risk          >240 mg/dL      Triglyceride Reference Ranges  (U.S. Department of Health and Human Services ATP III Classifications)    Normal           <150 mg/dL  Borderline High  150-199 mg/dL  High             200-499 mg/dL  Very High        >500 mg/dL    HDL Reference Ranges  (U.S. Department of Health and Human Services ATP III Classifications)    Low     <40 mg/dl (major risk factor for CHD)  High    >60 mg/dl ('negative' risk factor for CHD)        LDL Reference Ranges  (U.S. Department of Health and Human Services ATP III Classifications)    Optimal          <100 mg/dL  Near Optimal     100-129 mg/dL  Borderline High  130-159 mg/dL  High             160-189 mg/dL  Very High        >189 mg/dL    Magnesium [[id number]]  (Normal) Collected: 07/11/23 0128    Specimen: Blood Updated: 07/11/23 0405     Magnesium 1.8 mg/dL     High Sensitivity Troponin T [[id number]]  (Abnormal) Collected: 07/11/23 0128    Specimen: Blood Updated: 07/11/23 0204     HS Troponin T 179 ng/L     Narrative:      High Sensitive Troponin T  Reference Range:  <10.0 ng/L- Negative Female for AMI  <15.0 ng/L- Negative Male for AMI  >=10 - Abnormal Female indicating possible myocardial injury.  >=15 - Abnormal Male indicating possible myocardial injury.   Clinicians would have to utilize clinical acumen, EKG, Troponin, and serial changes to determine if it is an Acute Myocardial Infarction or myocardial injury due to an underlying chronic condition.         Comprehensive Metabolic Panel [[id number]]  (Abnormal) Collected: 07/11/23 0128    Specimen: Blood Updated: 07/11/23 0203     Glucose 152 mg/dL      BUN 36 mg/dL      Creatinine 0.86 mg/dL      Sodium 140 mmol/L      Potassium 4.2 mmol/L      Chloride 106 mmol/L      CO2 22.0 mmol/L      Calcium 8.4 mg/dL      Total Protein 5.7 g/dL      Albumin 3.4 g/dL      ALT (SGPT) 11 U/L      AST (SGOT) 23 U/L      Alkaline Phosphatase 66 U/L      Total Bilirubin 0.2 mg/dL      Globulin 2.3 gm/dL      A/G Ratio 1.5 g/dL      BUN/Creatinine Ratio 41.9     Anion Gap 12.0 mmol/L      eGFR 65.5 mL/min/1.73     Narrative:      GFR Normal >60  Chronic Kidney Disease <60  Kidney Failure <15    The GFR formula is only valid for adults with stable renal function between ages 18 and 70.    Protime-INR [[id number]]  (Normal) Collected: 07/11/23 0128    Specimen: Blood Updated: 07/11/23 0145     Protime 10.4 Seconds      INR 0.97    aPTT [[id number]]  (Abnormal) Collected: 07/11/23 0128    Specimen: Blood Updated: 07/11/23 0145     PTT 24.2 seconds     D-dimer, Quantitative [[id number]]  (Normal) Collected: 07/11/23 0128    Specimen: Blood Updated: 07/11/23 0145     D-Dimer, Quantitative 0.77 mg/L (FEU)     Narrative:      According to the assay 's published package insert, a normal (<0.50 mg/L (FEU)) D-dimer result in conjunction with a non-high clinical probability assessment, excludes deep vein thrombosis (DVT) and pulmonary embolism (PE) with high sensitivity.    D-dimer values increase with age and this can make  "VTE exclusion of an older population difficult. To address this, the [employer], based on best available evidence and recent guidelines, recommends that clinicians use age-adjusted D-dimer thresholds in patients greater than 50 years of age with: a) a low probability of PE who do not meet all Pulmonary Embolism Rule Out Criteria, or b) in those with intermediate probability of PE.   The formula for an age-adjusted D-dimer cut-off is \"age/100\".  For example, a 60 year old patient would have an age-adjusted cut-off of 0.60 mg/L (FEU) and an 80 year old 0.80 mg/L (FEU).    CBC & Differential [[id number]]  (Abnormal) Collected: 07/11/23 0128    Specimen: Blood Updated: 07/11/23 0133    Narrative:      The following orders were created for panel order CBC & Differential.  Procedure                               Abnormality         Status                     ---------                               -----------         ------                     CBC Auto Differential[[id number]]        Abnormal            Final result                 Please view results for these tests on the individual orders.    CBC Auto Differential [[id number]]  (Abnormal) Collected: 07/11/23 0128    Specimen: Blood Updated: 07/11/23 0133     WBC 9.40 10*3/mm3      RBC 4.31 10*6/mm3      Hemoglobin 12.8 g/dL      Hematocrit 38.8 %      MCV 89.9 fL      MCH 29.8 pg      MCHC 33.1 g/dL      RDW 13.1 %      RDW-SD 43.8 fl      MPV 7.7 fL      Platelets 201 10*3/mm3      Neutrophil % 78.3 %      Lymphocyte % 13.4 %      Monocyte % 7.0 %      Eosinophil % 1.1 %      Basophil % 0.2 %      Neutrophils, Absolute 7.40 10*3/mm3      Lymphocytes, Absolute 1.30 10*3/mm3      Monocytes, Absolute 0.70 10*3/mm3      Eosinophils, Absolute 0.10 10*3/mm3      Basophils, Absolute 0.00 10*3/mm3      nRBC 0.0 /100 WBC             Imaging Results (Last 24 Hours)       Procedure Component Value Units Date/Time    XR Chest 1 View [[id number]] Collected: " 07/11/23 0708     Updated: 07/11/23 0711    Narrative:      XR CHEST 1 VW    Date of Exam: 7/11/2023 1:32 AM EDT    Indication: cp    Comparison: 12/28/2010  Findings:  Heart and pulmonary vessels appear within normal limits. Lung fields are somewhat hyperinflated. There are no acute lung infiltrates or effusions.      Impression:      Impression:  Mild hyperinflation.      Electronically Signed: Alyssa Beard MD    7/11/2023 7:08 AM EDT    Workstation ID: [id number]        LAB RESULTS (LAST 7 DAYS)    CBC  Results from last 7 days   Lab Units 07/11/23  0422 07/11/23  0128   WBC 10*3/mm3 10.10 9.40   RBC 10*6/mm3 4.38 4.31   HEMOGLOBIN g/dL 13.1 12.8   HEMATOCRIT % 40.2 38.8   MCV fL 91.8 89.9   PLATELETS 10*3/mm3 215 201       BMP  Results from last 7 days   Lab Units 07/11/23  0422 07/11/23  0128   SODIUM mmol/L 140 140   POTASSIUM mmol/L 4.3 4.2   CHLORIDE mmol/L 106 106   CO2 mmol/L 22.0 22.0   BUN mg/dL 32* 36*   CREATININE mg/dL 0.80 0.86   GLUCOSE mg/dL 133* 152*   MAGNESIUM mg/dL  --  1.8       CMP   Results from last 7 days   Lab Units 07/11/23  0422 07/11/23  0128   SODIUM mmol/L 140 140   POTASSIUM mmol/L 4.3 4.2   CHLORIDE mmol/L 106 106   CO2 mmol/L 22.0 22.0   BUN mg/dL 32* 36*   CREATININE mg/dL 0.80 0.86   GLUCOSE mg/dL 133* 152*   ALBUMIN g/dL  --  3.4*   BILIRUBIN mg/dL  --  0.2   ALK PHOS U/L  --  66   AST (SGOT) U/L  --  23   ALT (SGPT) U/L  --  11         BNP        TROPONIN  Results from last 7 days   Lab Units 07/11/23 0422   HSTROP T ng/L 285*       CoAg  Results from last 7 days   Lab Units 07/11/23  0959 07/11/23  0128   INR   --  0.97   APTT seconds 54.4* 24.2*       Creatinine Clearance  Estimated Creatinine Clearance: 39.1 mL/min (by C-G formula based on SCr of 0.8 mg/dL).    ABG        Radiology  XR Chest 1 View    Result Date: 7/11/2023  Impression: Mild hyperinflation. Electronically Signed: Alyssa Beard MD  7/11/2023 7:08 AM EDT  Workstation ID: [id number]       EKG      I  personally viewed and interpreted the patient's EKG/Telemetry data: Sinus rhythm and ST-T wave abnormalities.    ECHOCARDIOGRAM:        STRESS TEST        Cardiolite (Tc-99m sestamibi) stress test    HEART CATHETERIZATION  No results found for this or any previous visit.      OTHER:     Assessment & Plan     Principal Problem:    NSTEMI (non-ST elevated myocardial infarction)     Dary Fabian is a 87 y.o. female with a history of CAD status post stent 2011 and hypertension who presents with chest pain.  Patient states she began having chest pain yesterday while sitting in her chair reading.  She describes it as a burning sensation with no radiation to the arms or jaws.  She also has associated nausea but denies vomiting.  She reports that the symptoms feel similar to the previous MI she had in 2011 that required a stent.  She denies any current chest pain, shortness of breath, dizziness or lightheadedness.  She states Dr. Matta used to be her cardiologist but she has not seen him in quite some time.     She was started on a heparin drip for elevated troponin of 179 and 285.  EKG shows normal sinus rhythm.  Telemetry reveals sinus rhythm occasionally transitioning into A-fib.    CAD status post PCI 2011  Hypertension      Patient presents with typical chest pain symptoms  High-sensitivity troponin 179, repeat 285  EKG shows normal sinus rhythm, rate 97  Telemetry reveals sinus rhythm with occasional conversion to A-fib  Patient on heparin drip  History of CAD status post PCI in 2011 with Dr. Matta  History of hypertension  Patient denies taking any home medications except Tylenol  No known allergies  Echocardiogram pending  Patient n.p.o. for potential left heart catheterization    Further plan and recommendations per Dr. Vizcarra.        ARIES Landon  07/11/23  12:08 EDT  Electronically signed by ARIES Landon, 07/11/23, 12:20 PM EDT.    The patient was seen around 12:30 PM today.  Chart  was reviewed in entirety history and physical and face-to-face evaluation was conducted.  Majority of the MDM was performed by me.  Reviewed and agree with the assessment and plan as documented by nurse practitioner Olive Garcia.  Patient has history of hypertension  Status post stent 2011 by Dr. Moreno [facility].  Patient presented with unstable angina symptoms.  Elevated troponin level with trending up.  Patient to have cardiac catheterization and coronary arteriography.  Risks and benefits pros and cons of the procedure including infection bleeding blood clot heart attack stroke allergic reaction were discussed.  Echocardiogram will be reviewed.  Further plan will depend on patient's progress.  ]]]]]]]]]]]]]]]]]]]]  Electronically signed by Yoana Vizcarra MD, 07/11/23, 1:07 PM EDT.     Cardiac catheterization 7/11/2023 revealed    Left main coronary artery has distal 50% disease.  Left anterior descending artery has ostial 99% disease.  Mid segment has 50 to 60% disease.  Diagonal branch is a small caliber vessel that has 50 to 60% disease.  Circumflex coronary artery is a large vessel but nondominant and has proximal 95% disease.  Right coronary artery is a large and dominant vessel and has ostial 99% disease and diffuse 50% disease in the mid and distal segments.  Left ventricular branch and PDA has 70 to 80% disease.    RECOMMENDATIONS:  Patient has severe and critical triple-vessel coronary artery disease.  Patient has left ventricular dysfunction likely due to recent stenting effect from probably transient total occlusion of left anterior descending artery.  Patient would benefit from CABG.  Cardiovascular surgery consultation initiated.  Have discussed with Dr. Hylton.  Restart intravenous heparin after sheaths are pulled and patient is stable without any bleeding.  Have discussed with patient's family daughter and granddaughter.

## 2023-07-11 NOTE — H&P
[facility]      Patient Name: Dary Fabian  : 1935  MRN: [id number]  Primary Care Physician:  Provider, No Known  Date of admission: 2023      Subjective      Chief Complaint: chest pain    History of Present Illness: Dary Fabian is a 87 y.o. female with PMHx of CAD and HTN who presented to [facility] on 2023 complaining of chest pain.  Admits to 7-8/10 substernal chest pain intermittently happending throughout the day but worsened around 1900.  Denies radiation, fevers, chills, dysphagia, vomiting, diarrhea.  Due to concern for ACS, she presented to [facility] for evaluation.    In the ER, vitals 98.3, HR 98, RR 18, /83, 94 RA.  Labs notable for HS troponin 179, BUN 36, glucose 152, Ca 8.4.  CXR without acute abnormality.  EKG NSR with LBBB.  Chest pain relived with SL nitro.  She is to be started on heparin drip and admitted for cardiac workup.    ROS   12 point ROS reviewed and negative except as mentioned above      Personal History     No past medical history on file.    No past surgical history on file.    Family History: family history is not on file. Otherwise pertinent FHx was reviewed and not pertinent to current issue.    Social History:      Home Medications:  Prior to Admission Medications       None              Allergies:  No Known Allergies    Objective      Vitals:   Temp:  [98.3 °F (36.8 °C)] 98.3 °F (36.8 °C)  Heart Rate:  [93-98] 98  Resp:  [17-18] 18  BP: (124-141)/(82-83) 141/83    Physical Exam  Constitutional:       General: She is not in acute distress.     Appearance: Normal appearance. She is not toxic-appearing.   HENT:      Head: Normocephalic and atraumatic.      Nose: Nose normal. No congestion.      Mouth/Throat:      Pharynx: Oropharynx is clear. No oropharyngeal exudate.   Eyes:      General: No scleral icterus.  Cardiovascular:      Rate and Rhythm: Normal rate and regular rhythm.      Heart sounds: Murmur  heard.     No friction rub. No gallop.   Pulmonary:      Effort: No respiratory distress.      Breath sounds: No wheezing or rales.   Abdominal:      General: There is no distension.      Tenderness: There is no abdominal tenderness. There is no guarding.   Musculoskeletal:         General: No swelling or deformity.      Cervical back: Normal range of motion. No rigidity.      Right lower leg: No edema.      Left lower leg: No edema.   Skin:     Coloration: Skin is not jaundiced.      Findings: No bruising or lesion.   Neurological:      General: No focal deficit present.      Mental Status: She is alert and oriented to person, place, and time.      Motor: No weakness.        Result Review    Result Review:  I have personally reviewed the results from the time of this admission to 7/11/2023 02:31 EDT and agree with these findings:  [x]  Laboratory  []  Microbiology  [x]  Radiology  [x]  EKG/Telemetry   []  Cardiology/Vascular   []  Pathology  []  Old records  []  Other:        Assessment & Plan        Active Hospital Problems:  There are no active hospital problems to display for this patient.    Plan:     #NSTEMI   #Unstable Angina    - CXR reviewed    - EKG NSR with LBBB, no gross ischemia    - HS troponin 179, trend    - aspirin    - statin    - SL nitro PRN, chest pain relieved after SL nitro    - heparin drip, monitor for bleeding    - A1c, lipid panel, tsh    - check mag and ionized calcium    - NPO    - LR @ 50/hr    - echo to evaluate heart murmur    - pain control    - cardiology consulted      #CAD    - aspirin    - statin    #HTN    - start Toprol 12.5mg PO daily      DVT prophylaxis: Patient on heparin drip      CODE STATUS:       Admission Status:  I believe this patient meets observation status.    I discussed the patient's findings and my recommendations with patient.    This patient has been examined wearing appropriate Personal Protective Equipment and discussed with  Patient .  07/11/23      Signature: Electronically signed by Libra Caputo DO, 07/11/23, 2:39 AM EDT.

## 2023-07-11 NOTE — Clinical Note
A 6 fr sheath was  inserted into the right femoral artery. Sheath insertion not delayed. Over long wire

## 2023-07-12 PROBLEM — I25.110 CORONARY ARTERY DISEASE INVOLVING NATIVE CORONARY ARTERY OF NATIVE HEART WITH UNSTABLE ANGINA PECTORIS: Status: ACTIVE | Noted: 2023-01-01

## 2023-07-12 NOTE — THERAPY EVALUATION
Patient Name: Dary Fabian  : 1935    MRN: [id number]                              Today's Date: 2023       Admit Date: 2023    Visit Dx:     ICD-10-CM ICD-9-CM   1. NSTEMI (non-ST elevated myocardial infarction)  I21.4 410.70   2. Unstable angina  I20.0 411.1   3. Status post angioplasty with stent  Z95.820 V45.89   4. Coronary artery disease involving native coronary artery of native heart with unstable angina pectoris  I25.110 414.01     411.1     Patient Active Problem List   Diagnosis    NSTEMI (non-ST elevated myocardial infarction)    Unstable angina    Status post angioplasty with stent    Coronary artery disease involving native coronary artery of native heart with unstable angina pectoris     Past Medical History:   Diagnosis Date    Coronary artery disease     Elevated cholesterol     Hypertension      Past Surgical History:   Procedure Laterality Date    CARDIAC CATHETERIZATION N/A 2023    Procedure: Left Heart Cath and coronary angiogram;  Surgeon: Yoana Vizcarra MD;  Location: [facility] CATH INVASIVE LOCATION;  Service: Cardiovascular;  Laterality: N/A;    HYSTERECTOMY        General Information       Row Name 23 1357          Physical Therapy Time and Intention    Document Type evaluation  -EJ     Mode of Treatment physical therapy  -EJ       Row Name 23 4785          General Information    Patient Profile Reviewed yes  -EJ     Prior Level of Function independent:;gait;transfer;bed mobility;min assist:;bathing  Per pt she ambulates with a rollator walker.  She is independent, but does not cook and daugther assists her with bathing.  Pt can dress herself.  -EJ     Existing Precautions/Restrictions fall  Per pt she has had 5-6 falls in the last 6 months.  -EJ     Barriers to Rehab medically complex  -EJ       Row Name 23 0041          Living Environment    People in Home child(tevin), adult  Pt lives with her daughter.  Has supportive granddaughter also.  -EJ      Name(s) of People in Home Millie (daughter)  -       Row Name 07/12/23 1357          Home Main Entrance    Number of Stairs, Main Entrance two  -EJ     Stair Railings, Main Entrance railings safe and in good condition  -       Row Name 07/12/23 1357          Stairs Within Home, Primary    Number of Stairs, Within Home, Primary none  lives in first floor apartment  -       Row Name 07/12/23 1357          Cognition    Orientation Status (Cognition) oriented x 4  -               User Key  (r) = Recorded By, (t) = Taken By, (c) = Cosigned By      Initials Name Provider Type    Alyssa Forman, PT Physical Therapist                   Mobility       Row Name 07/12/23 1403          Bed Mobility    Bed Mobility bed mobility (all) activities  -     All Activities, Andrews (Bed Mobility) other (see comments);not tested  Pt was in restroom and ended in chair.  -       Row Name 07/12/23 1403          Sit-Stand Transfer    Sit-Stand Andrews (Transfers) standby assist;contact guard  -     Assistive Device (Sit-Stand Transfers) walker, front-wheeled  -       Row Name 07/12/23 1403          Gait/Stairs (Locomotion)    Andrews Level (Gait) contact guard;minimum assist (75% patient effort)  -     Assistive Device (Gait) walker, front-wheeled  -     Distance in Feet (Gait) 75 feet  -     Deviations/Abnormal Patterns (Gait) gait speed decreased;stride length decreased  -     Left Sided Gait Deviations forward flexed posture  -               User Key  (r) = Recorded By, (t) = Taken By, (c) = Cosigned By      Initials Name Provider Type    Alyssa Forman, PT Physical Therapist                   Obj/Interventions       Row Name 07/12/23 1405          Range of Motion Comprehensive    General Range of Motion bilateral lower extremity ROM WFL  -       Row Name 07/12/23 1405          Strength Comprehensive (MMT)    Comment, General Manual Muscle Testing (MMT) Assessment BLE strength  grossly 4- to 4/5  -EJ       Row Name 07/12/23 1405          Balance    Balance Assessment sitting static balance;sit to stand dynamic balance;standing static balance;sitting dynamic balance;standing dynamic balance  -EJ     Static Sitting Balance independent  -EJ     Dynamic Sitting Balance independent  -EJ     Position, Sitting Balance sitting in chair  -EJ     Sit to Stand Dynamic Balance standby assist  -EJ     Static Standing Balance standby assist  -EJ     Dynamic Standing Balance contact guard;minimal assist  -EJ     Position/Device Used, Standing Balance walker, front-wheeled  -EJ     Balance Interventions sitting;standing;sit to stand;supported;static;dynamic;minimal challenge  -EJ               User Key  (r) = Recorded By, (t) = Taken By, (c) = Cosigned By      Initials Name Provider Type    EJ Alyssa Logan, PT Physical Therapist                   Goals/Plan       [facility] Name 07/12/23 1414          Bed Mobility Goal 1 (PT)    Activity/Assistive Device (Bed Mobility Goal 1, PT) bed mobility activities, all  -EJ     Braxton Level/Cues Needed (Bed Mobility Goal 1, PT) standby assist;modified independence  -EJ     Time Frame (Bed Mobility Goal 1, PT) long term goal (LTG);2 weeks  -[facility] Name 07/12/23 1414          Transfer Goal 1 (PT)    Activity/Assistive Device (Transfer Goal 1, PT) transfers, all;walker, rolling  -EJ     Braxton Level/Cues Needed (Transfer Goal 1, PT) supervision required  -EJ     Time Frame (Transfer Goal 1, PT) long term goal (LTG);2 weeks  -[facility] Name 07/12/23 1414          Gait Training Goal 1 (PT)    Activity/Assistive Device (Gait Training Goal 1, PT) gait (walking locomotion);walker, rolling  -EJ     Braxton Level (Gait Training Goal 1, PT) standby assist  -EJ     Distance (Gait Training Goal 1, PT) 200 feet  -EJ     Time Frame (Gait Training Goal 1, PT) long term goal (LTG);2 weeks  -[facility] Name 07/12/23 1414          Stairs Goal 1 (PT)     Woodrow Level/Cues Needed (Stairs Goal 1, PT) contact guard required  -EJ     Number of Stairs (Stairs Goal 1, PT) 2  -EJ     Time Frame (Stairs Goal 1, PT) long term goal (LTG);2 weeks  -       Row Name 07/12/23 1419          Therapy Assessment/Plan (PT)    Planned Therapy Interventions (PT) balance training;bed mobility training;gait training;patient/family education;neuromuscular re-education;stair training;strengthening;transfer training;home exercise program  -               User Key  (r) = Recorded By, (t) = Taken By, (c) = Cosigned By      Initials Name Provider Type    EJ Alyssa Logan, PT Physical Therapist                   Clinical Impression       Row Name 07/12/23 140          Pain    Pretreatment Pain Rating 0/10 - no pain  -EJ     Posttreatment Pain Rating 0/10 - no pain  -EJ       Row Name 07/12/23 1404          Plan of Care Review    Plan of Care Reviewed With patient;daughter  -     Outcome Evaluation Patient is an 86 y/o F who was admitted 7/11/23 with c/o moderate substernal chest burning/pressure with nausea and dyspnea. Cardiac cath revealed severe and critical triple-vessel coronary artery disease and pt had non-ST elevation MI.   Pt is candidate for CABG and is to have procedure in near future.  PMHx is also significant for CAD and HTN.  At baseline pt lives with her daughter, she ambulates with a rollator, and is independent with dressing and getting around home.  Pt gets assist with bathing from daughter.  Per pt she has had 5-6 falls within last 6 months.  Pt has 2 steps to enter first floor apartment.  Daughter works 6-2 so pt is home alone for part of day. During today's evaluation pt was SBA with sit to stand using RW, and was CGA to min A with RW.  Pt required assist at times with safe w/c maneuvering.  PT will plan to perform re-eval once pt has CABG, and anticipate home with family and home health therapy. VSS throughout session.  -       Row Name 07/12/23 1660           Therapy Assessment/Plan (PT)    Criteria for Skilled Interventions Met (PT) yes;skilled treatment is necessary  -EJ     Therapy Frequency (PT) 5 times/wk  -EJ     Predicted Duration of Therapy Intervention (PT) until discharge  -       Row Name 07/12/23 1406          Positioning and Restraints    Pre-Treatment Position bathroom  -EJ     Post Treatment Position chair  -EJ     In Chair reclined;call light within reach;encouraged to call for assist;exit alarm on;with family/caregiver;notified nsg  -               User Key  (r) = Recorded By, (t) = Taken By, (c) = Cosigned By      Initials Name Provider Type    Alyssa Forman, PT Physical Therapist                   Outcome Measures       Row Name 07/12/23 1415          How much help from another person do you currently need...    Turning from your back to your side while in flat bed without using bedrails? 3  -EJ     Moving from lying on back to sitting on the side of a flat bed without bedrails? 3  -EJ     Moving to and from a bed to a chair (including a wheelchair)? 3  -EJ     Standing up from a chair using your arms (e.g., wheelchair, bedside chair)? 3  -EJ     Climbing 3-5 steps with a railing? 3  -EJ     To walk in hospital room? 3  -EJ     AM-PAC 6 Clicks Score (PT) 18  -EJ     Highest level of mobility 6 --> Walked 10 steps or more  -       Row Name 07/12/23 1415          Functional Assessment    Outcome Measure Options AM-PAC 6 Clicks Basic Mobility (PT)  -               User Key  (r) = Recorded By, (t) = Taken By, (c) = Cosigned By      Initials Name Provider Type    Alyssa Forman, PT Physical Therapist                                 Physical Therapy Education       Title: PT OT SLP Therapies (In Progress)       Topic: Physical Therapy (In Progress)       Point: Mobility training (Done)       Learning Progress Summary             Patient Acceptance, E, VU by TESFAYE at 7/12/2023 1416                         Point: Home exercise program (Not  Started)       Learner Progress:  Not documented in this visit.              Point: Body mechanics (Done)       Learning Progress Summary             Patient Acceptance, E, VU by EJ at 7/12/2023 1416                         Point: Precautions (Done)       Learning Progress Summary             Patient Acceptance, E, VU by EJ at 7/12/2023 1416                                         User Key       Initials Effective Dates Name Provider Type Discipline     07/11/23 -  Alyssa Logan, PT Physical Therapist PT                  PT Recommendation and Plan  Planned Therapy Interventions (PT): balance training, bed mobility training, gait training, patient/family education, neuromuscular re-education, stair training, strengthening, transfer training, home exercise program  Plan of Care Reviewed With: patient, daughter  Outcome Evaluation: Patient is an 86 y/o F who was admitted 7/11/23 with c/o moderate substernal chest burning/pressure with nausea and dyspnea. Cardiac cath revealed severe and critical triple-vessel coronary artery disease and pt had non-ST elevation MI.   Pt is candidate for CABG and is to have procedure in near future.  PMHx is also significant for CAD and HTN.  At baseline pt lives with her daughter, she ambulates with a rollator, and is independent with dressing and getting around home.  Pt gets assist with bathing from daughter.  Per pt she has had 5-6 falls within last 6 months.  Pt has 2 steps to enter first floor apartment.  Daughter works 6-2 so pt is home alone for part of day. During today's evaluation pt was SBA with sit to stand using RW, and was CGA to min A with RW.  Pt required assist at times with safe w/c maneuvering.  PT will plan to perform re-eval once pt has CABG, and anticipate home with family and home health therapy. VSS throughout session.     Time Calculation:    PT Charges       Row Name 07/12/23 1416             Time Calculation    Start Time 1049  -EJ      Stop Time 1108  -EJ       Time Calculation (min) 19 min  -EJ      PT Received On 07/12/23  -EJ      PT - Next Appointment 07/13/23  -EJ      PT Goal Re-Cert Due Date 07/26/23  -EJ         Time Calculation- PT    Total Timed Code Minutes- PT 0 minute(s)  -EJ                User Key  (r) = Recorded By, (t) = Taken By, (c) = Cosigned By      Initials Name Provider Type     Alyssa Logan, PT Physical Therapist                  Therapy Charges for Today       Code Description Service Date Service Provider Modifiers Qty    89760042921 HC PT EVAL MOD COMPLEXITY 4 7/12/2023 Alyssa Logan, PT GP 1            PT G-Codes  Outcome Measure Options: AM-PAC 6 Clicks Basic Mobility (PT)  AM-PAC 6 Clicks Score (PT): 18  PT Discharge Summary  Anticipated Discharge Disposition (PT): home with assist, home with home health    Alyssa Logan, PT  7/12/2023

## 2023-07-12 NOTE — PROGRESS NOTES
Hospitalist Progress Note   Dary Fabian : 1935 MRN:[id number] LOS:1     Principal Problem: NSTEMI (non-ST elevated myocardial infarction)     Reason for follow up: All the medical problems listed below    Summary     A 87 y.o. female with PMH of CAD, hypertension presented to the hospital for chest pain and was admitted with a principal diagnosis of NSTEMI (non-ST elevated myocardial infarction).  Cardiology was consulted, treated with heparin drip.  Troponin peaked at 285.  Also increased metoprolol dose due to poorly controlled blood pressure.  Subsequently, had a cardiac cath on  which revealed triple-vessel disease.  CTS was consulted for CABG.    Assessment / Plan     Atherosclerotic heart disease:   Cath with triple-vessel disease.  CTS consulted for CABG.  c/w aspirin, metoprolol and statins.  Defer heparin drip to cardiology.    Essential Hypertension: well controlled.   Continue metoprolol.   Titrate medications as needed.    Disposition: Awaiting CABG.    Subjective / Review of systems     Review of Systems   Feels okay, no chest pain.  No shortness of breath.  Awaiting CTS to decide on the timing of CABG.  Objective / Physical Exam   Vital signs:  Temp: 97.4 °F (36.3 °C)  BP: 130/66  Heart Rate: 79  Resp: 16  SpO2: 98 %  Weight: 58.3 kg (128 lb 8.5 oz)    Admission Weight: Weight: 56.7 kg (125 lb)  Current Weight: Weight: 58.3 kg (128 lb 8.5 oz)    Input/Output in last 24 hours:    Intake/Output Summary (Last 24 hours) at 2023 1225  Last data filed at 2023 0600  Gross per 24 hour   Intake 1505 ml   Output 800 ml   Net 705 ml      Physical Exam  Vitals and nursing note reviewed.   Constitutional:       General: She is not in acute distress.     Appearance: Normal appearance.   HENT:      Mouth/Throat:      Mouth: Mucous membranes are moist.      Pharynx: Oropharynx is clear.   Eyes:      General: No scleral icterus.     Extraocular Movements: Extraocular movements intact.       Conjunctiva/sclera: Conjunctivae normal.   Cardiovascular:      Rate and Rhythm: Normal rate.      Heart sounds: No murmur heard.    No gallop.   Pulmonary:      Breath sounds: Normal breath sounds. No wheezing or rales.   Abdominal:      General: Bowel sounds are normal.      Palpations: Abdomen is soft.      Tenderness: There is no abdominal tenderness.   Musculoskeletal:         General: No tenderness.      Right lower leg: No edema.      Left lower leg: No edema.   Neurological:      General: No focal deficit present.      Mental Status: She is alert and oriented to person, place, and time.      Radiology and Labs     Results from last 7 days   Lab Units 07/12/23  1031 07/12/23  0417 07/11/23  0422 07/11/23  0128   WBC 10*3/mm3 8.30 8.50 10.10 9.40   HEMATOCRIT % 39.7 40.9 40.2 38.8   PLATELETS 10*3/mm3 195 187 215 201      Results from last 7 days   Lab Units 07/12/23  1031 07/11/23  0422 07/11/23  0128   SODIUM mmol/L 137 140 140   POTASSIUM mmol/L 3.6 4.3 4.2   CHLORIDE mmol/L 103 106 106   CO2 mmol/L 23.0 22.0 22.0   BUN mg/dL 15 32* 36*   CREATININE mg/dL 0.69 0.80 0.86      Current medications   Scheduled Meds: aspirin, 81 mg, Oral, Daily  atorvastatin, 80 mg, Oral, Daily  metoprolol succinate XL, 25 mg, Oral, Q24H  sodium chloride, 10 mL, Intravenous, Q12H      Continuous Infusions: heparin, 12 Units/kg/hr, Last Rate: 16 Units/kg/hr (07/12/23 1208)  lactated ringers, 50 mL/hr, Last Rate: 50 mL/hr (07/11/23 2050)        Reviewed all other data in the last 24 hours, including but not limited to vitals, labs, microbiology, imaging and pertinent notes from other providers.     Ventura William MD   [facility]  07/12/23   12:25 EDT

## 2023-07-12 NOTE — PROGRESS NOTES
Referring Provider: Ventura William MD    Reason for follow-up:  Non-STEMI  Severe and critical triple-vessel coronary artery disease.  Left ventricular dysfunction  Status post stent     Patient Care Team:  Provider, No Known as PCP - General    Subjective .      ROS    Since I have last seen, the patient has been without any chest discomfort ,shortness of breath, palpitations, dizziness or syncope.  Denies having any headache ,abdominal pain ,nausea, vomiting , diarrhea constipation, loss of weight or loss of appetite.  Denies having any excessive bruising ,hematuria or blood in the stool.    Review of all systems negative except as indicated    History  History reviewed. No pertinent past medical history.    Past Surgical History:   Procedure Laterality Date    CARDIAC CATHETERIZATION N/A 7/11/2023    Procedure: Left Heart Cath and coronary angiogram;  Surgeon: Yoana Vizcarra MD;  Location: [facility] CATH INVASIVE LOCATION;  Service: Cardiovascular;  Laterality: N/A;       History reviewed. No pertinent family history.          Medications Prior to Admission   Medication Sig Dispense Refill Last Dose    acetaminophen (TYLENOL) 325 MG tablet Take 2 tablets by mouth Every 6 (Six) Hours As Needed for Mild Pain.          Allergies  Patient has no known allergies.    Scheduled Meds:aspirin, 81 mg, Oral, Daily  atorvastatin, 80 mg, Oral, Daily  metoprolol succinate XL, 25 mg, Oral, Q24H  sodium chloride, 10 mL, Intravenous, Q12H      Continuous Infusions:heparin, 12 Units/kg/hr, Last Rate: 15 Units/kg/hr (07/12/23 0514)  lactated ringers, 50 mL/hr, Last Rate: 50 mL/hr (07/11/23 2050)      PRN Meds:.  acetaminophen    atropine    Calcium Replacement - Follow Nurse / BPA Driven Protocol    diphenhydrAMINE    heparin    heparin    Magnesium Standard Dose Replacement - Follow Nurse / BPA Driven Protocol    Morphine    nitroglycerin    ondansetron **OR** ondansetron    Phosphorus Replacement - Follow Nurse / BPA Driven  "Protocol    Potassium Replacement - Follow Nurse / BPA Driven Protocol    sodium chloride    sodium chloride    sodium chloride    Objective     VITAL SIGNS  Vitals:    07/12/23 0300 07/12/23 0315 07/12/23 0355 07/12/23 0400   BP:  120/68  112/58   Pulse:  88  71   Resp:       Temp:   98.2 °F (36.8 °C)    TempSrc:   Oral    SpO2:  97%  95%   Weight: 57.8 kg (127 lb 6.4 oz)      Height:           Flowsheet Rows      Flowsheet Row First Filed Value   Admission Height 152.4 cm (60\") Documented at 07/11/2023 0053   Admission Weight 56.7 kg (125 lb) Documented at 07/11/2023 0053              Intake/Output Summary (Last 24 hours) at 7/12/2023 0654  Last data filed at 7/12/2023 0600  Gross per 24 hour   Intake 1505 ml   Output 800 ml   Net 705 ml        TELEMETRY: Sinus rhythm    Physical Exam:  The patient is alert, oriented and in no distress.  Vital signs as noted above.  Head and neck revealed no carotid bruits or jugular venous distention.  No thyromegaly or lymphadenopathy is present  Lungs clear.  No wheezing.  Breath sounds are normal bilaterally.  Heart normal first and second heart sounds.  No murmur. No precordial rub is present.  No gallop is present.  Abdomen soft and nontender.  No organomegaly is present.  Extremities with good peripheral pulses without any pedal edema.  Cardiac catheterization site looks normal.  Skin warm and dry.  Musculoskeletal system is grossly normal  CNS grossly normal    Reviewed and updated.    Results Review:   I reviewed the patient's new clinical results.  Lab Results (last 24 hours)       Procedure Component Value Units Date/Time    TSH [[id number]]  (Normal) Collected: 07/12/23 0417    Specimen: Blood Updated: 07/12/23 0521     TSH 1.760 uIU/mL     Magnesium [[id number]]  (Normal) Collected: 07/12/23 0417    Specimen: Blood Updated: 07/12/23 0518     Magnesium 2.0 mg/dL     Basic Metabolic Panel [[id number]] Updated: 07/12/23 0518    Specimen: Blood     Protime-INR [[id number]]  " (Normal) Collected: 07/12/23 0417    Specimen: Blood Updated: 07/12/23 0505     Protime 10.7 Seconds      INR 1.00    aPTT [[id number]]  (Abnormal) Collected: 07/12/23 0417    Specimen: Blood Updated: 07/12/23 0505     PTT 31.6 seconds     CBC (No Diff) [[id number]]  (Normal) Collected: 07/12/23 0417    Specimen: Blood Updated: 07/12/23 0452     WBC 8.50 10*3/mm3      RBC 4.48 10*6/mm3      Hemoglobin 13.5 g/dL      Hematocrit 40.9 %      MCV 91.2 fL      MCH 30.1 pg      MCHC 33.0 g/dL      RDW 13.2 %      RDW-SD 44.6 fl      MPV 9.3 fL      Platelets 187 10*3/mm3     aPTT [[id number]]  (Abnormal) Collected: 07/11/23 1918    Specimen: Blood Updated: 07/11/23 1949     PTT 27.9 seconds     Magnesium [[id number]]  (Normal) Collected: 07/11/23 1918    Specimen: Blood Updated: 07/11/23 1943     Magnesium 1.9 mg/dL     aPTT [[id number]]  (Abnormal) Collected: 07/11/23 0959    Specimen: Blood Updated: 07/11/23 1044     PTT 54.4 seconds             Imaging Results (Last 24 Hours)       Procedure Component Value Units Date/Time    XR Chest 1 View [[id number]] Collected: 07/11/23 0708     Updated: 07/11/23 0711    Narrative:      XR CHEST 1 VW    Date of Exam: 7/11/2023 1:32 AM EDT    Indication: cp    Comparison: 12/28/2010  Findings:  Heart and pulmonary vessels appear within normal limits. Lung fields are somewhat hyperinflated. There are no acute lung infiltrates or effusions.      Impression:      Impression:  Mild hyperinflation.      Electronically Signed: Alyssa Beard MD    7/11/2023 7:08 AM EDT    Workstation ID: [id number]        LAB RESULTS (LAST 7 DAYS)    CBC  Results from last 7 days   Lab Units 07/12/23 0417 07/11/23 0422 07/11/23 0128   WBC 10*3/mm3 8.50 10.10 9.40   RBC 10*6/mm3 4.48 4.38 4.31   HEMOGLOBIN g/dL 13.5 13.1 12.8   HEMATOCRIT % 40.9 40.2 38.8   MCV fL 91.2 91.8 89.9   PLATELETS 10*3/mm3 187 215 201       BMP  Results from last 7 days   Lab Units 07/12/23  0417 07/11/23  1918 07/11/23  0423  07/11/23  0128   SODIUM mmol/L  --   --  140 140   POTASSIUM mmol/L  --   --  4.3 4.2   CHLORIDE mmol/L  --   --  106 106   CO2 mmol/L  --   --  22.0 22.0   BUN mg/dL  --   --  32* 36*   CREATININE mg/dL  --   --  0.80 0.86   GLUCOSE mg/dL  --   --  133* 152*   MAGNESIUM mg/dL 2.0 1.9  --  1.8       CMP   Results from last 7 days   Lab Units 07/11/23  0422 07/11/23  0128   SODIUM mmol/L 140 140   POTASSIUM mmol/L 4.3 4.2   CHLORIDE mmol/L 106 106   CO2 mmol/L 22.0 22.0   BUN mg/dL 32* 36*   CREATININE mg/dL 0.80 0.86   GLUCOSE mg/dL 133* 152*   ALBUMIN g/dL  --  3.4*   BILIRUBIN mg/dL  --  0.2   ALK PHOS U/L  --  66   AST (SGOT) U/L  --  23   ALT (SGPT) U/L  --  11         BNP        TROPONIN  Results from last 7 days   Lab Units 07/11/23  0422   HSTROP T ng/L 285*       CoAg  Results from last 7 days   Lab Units 07/12/23  0417 07/11/23  1918 07/11/23  0959 07/11/23  0128   INR  1.00  --   --  0.97   APTT seconds 31.6* 27.9* 54.4* 24.2*       Creatinine Clearance  Estimated Creatinine Clearance: 39.4 mL/min (by C-G formula based on SCr of 0.8 mg/dL).    ABG        Radiology  XR Chest 1 View    Result Date: 7/11/2023  Impression: Mild hyperinflation. Electronically Signed: Alyssa Beard MD  7/11/2023 7:08 AM EDT  Workstation ID: [id number]         EKG      I personally viewed and interpreted the patient's EKG/Telemetry data:    ECHOCARDIOGRAM:    Results for orders placed during the hospital encounter of 07/11/23    Adult Transthoracic Echo Complete W/ Cont if Necessary Per Protocol    Interpretation Summary    Left ventricular ejection fraction appears to be 31 - 35%.    Estimated right ventricular systolic pressure from tricuspid regurgitation is mildly elevated (35-45 mmHg).    Indications  Unstable angina  Non-STEMI    Technically satisfactory study.  Mitral valve is structurally normal.  Mild mitral regurgitation is present.  Tricuspid valve is structurally normal.  Aortic valve is thickened with adequate  opening motion.  Pulmonic valve could not be well visualized.  No evidence for tricuspid or aortic regurgitation is seen by Doppler study.  Left atrium is enlarged.  Right atrium is normal in size.  Left ventricle is enlarged with apical and severe periapical hypokinesis with ejection fraction of 30 to 35%.  Right ventricle is normal in size.  Atrial septum is intact.  Aorta is normal.  No pericardial effusion or intracardiac thrombus is seen.    Impression  Mild mitral regurgitation.  Aortic valve is thickened with adequate opening motion.  Left ventricle is enlarged with apical and severe periapical hypokinesis with ejection fraction of 30 to 35%.          STRESS TEST        Cardiolite (Tc-99m sestamibi) stress test    CARDIAC CATHETERIZATION  Results for orders placed during the hospital encounter of 07/11/23    Cardiac Catheterization/Vascular Study    Narrative  CARDIAC CATHETERIZATION REPORT    DATE OF PROCEDURE:  7/11/2023      INDICATION FOR PROCEDURE:  Non-STEMI  Unstable angina  Status post stent      PROCEDURE PERFORMED:    Left heart catheterization, coronary angiography, left ventriculography    @@  Moderate conscious sedation was utilized with intravenous Versed and fentanyl administered by registered nurse with continuous ECG, pulse oximetry and hemodynamic monitoring supervised by myself throughout the entire procedure.  Conscious sedation time   30 minutes    I was present with the patient for the duration of moderate sedation and supervised staff who had no other duties and monitored the patient for the entire procedure.    @@  PROCEDURE COMMENTS:  Under usual sterile precautions and 1% lidocaine infiltration right femoral artery was percutaneously punctured and a 5 Serbian vascular sheath was introduced into the right femoral artery.  Left and right coronary arteriography was performed in varying degrees of obliquity followed by left ventricular angiogram.  Hemostasis was obtained and patient was  returned to the room with intact pulses.  No complications were noted.      FINDINGS:    1. HEMODYNAMICS:  Left ventricle end-diastolic pressure is normal.  Gradient of 20 mmHg was noted.    2. LEFT VENTRICULOGRAPHY:  Left ventricular enlargement with apical and periapical severe hypokinesis with ejection fraction of 30 to 35%.  2+ mitral regurgitation is present.      3. CORONARY ANGIOGRAPHY:  Left main coronary artery has distal 50% disease.  Left anterior descending artery has ostial 99% disease.  Mid segment has 50 to 60% disease.  Diagonal branch is a small caliber vessel that has 50 to 60% disease.  Circumflex coronary artery is a large vessel but nondominant and has proximal 95% disease.  Right coronary artery is a large and dominant vessel and has ostial 99% disease and diffuse 50% disease in the mid and distal segments.  Left ventricular branch and PDA has 70 to 80% disease.      SUMMARY:    Left main coronary artery has distal 50% disease.  Left anterior descending artery has ostial 99% disease.  Mid segment has 50 to 60% disease.  Diagonal branch is a small caliber vessel that has 50 to 60% disease.  Circumflex coronary artery is a large vessel but nondominant and has proximal 95% disease.  Right coronary artery is a large and dominant vessel and has ostial 99% disease and diffuse 50% disease in the mid and distal segments.  Left ventricular branch and PDA has 70 to 80% disease.    RECOMMENDATIONS:  Patient has severe and critical triple-vessel coronary artery disease.  Patient has left ventricular dysfunction likely due to recent stenting effect from probably transient total occlusion of left anterior descending artery.  Patient would benefit from CABG.  Cardiovascular surgery consultation initiated.  Have discussed with Dr. Hylton.  Restart intravenous heparin after sheaths are pulled and patient is stable without any bleeding.  Have discussed with patient's family daughter and  granddaughter.                OTHER:         Assessment & Plan     Principal Problem:    NSTEMI (non-ST elevated myocardial infarction)  Active Problems:    Unstable angina    Status post angioplasty with stent    ]]]]]]]]]]]]]]]]]]]]]]  Impression  =============  -Unstable angina/non-STEMI    - Severe and critical coronary artery disease.  Cardiac cath 7/11/2023 revealed    Cardiac catheterization 7/11/2023 revealed     Left main coronary artery has distal 50% disease.  Left anterior descending artery has ostial 99% disease.  Mid segment has 50 to 60% disease.  Diagonal branch is a small caliber vessel that has 50 to 60% disease.  Circumflex coronary artery is a large vessel but nondominant and has proximal 95% disease.  Right coronary artery is a large and dominant vessel and has ostial 99% disease and diffuse 50% disease in the mid and distal segments.  Left ventricular branch and PDA has 70 to 80% disease.    - Ischemic cardiomyopathy with ejection fraction of 30 to 35%-likely due to stunning effect.    - Status post stent to LAD 2011 at [facility]-Dr. Moreno.    - Hypertension    - No known allergies  ==============  Plan  ===========  Patient presented with unstable angina pectoris.  Non-STEMI  High-sensitivity troponin 179, repeat 285  EKG shows normal sinus rhythm, rate 97    Patient had cardiac catheterization 7/11/2023 that revealed severe and critical triple-vessel coronary artery disease.  Patient has severe and critical triple-vessel coronary artery disease.    Patient has left ventricular dysfunction likely due to recent stenting effect from probably transient total occlusion of left anterior descending artery.  Hopefully left ventricle function would improve with revascularization.    Patient would benefit from CABG.  Cardiovascular surgery consultation initiated.  Have discussed with Dr. Hylton.  Intravenous heparin restarted.  Observe for toxic effects of high risk medication.    Have  discussed with patient's family daughter and granddaughter.    Further plan will depend on patient's progress.  ]]]]]]]]]]]]]]]]]]]]             Yoana Vizcarra MD  07/12/23  06:54 EDT

## 2023-07-12 NOTE — PLAN OF CARE
Goal Outcome Evaluation:  Plan of Care Reviewed With: patient, daughter           Outcome Evaluation: Patient is an 88 y/o F who was admitted 7/11/23 with c/o moderate substernal chest burning/pressure with nausea and dyspnea. Cardiac cath revealed severe and critical triple-vessel coronary artery disease and pt had non-ST elevation MI.   Pt is candidate for CABG and is to have procedure in near future.  PMHx is also significant for CAD and HTN.  At baseline pt lives with her daughter, she ambulates with a rollator, and is independent with dressing and getting around home.  Pt gets assist with bathing from daughter.  Per pt she has had 5-6 falls within last 6 months.  Pt has 2 steps to enter first floor apartment.  Daughter works 6-2 so pt is home alone for part of day. During today's evaluation pt was SBA with sit to stand using RW, and was CGA to min A with RW.  Pt required assist at times with safe w/c maneuvering.  PT will plan to perform re-eval once pt has CABG, and anticipate home with family and home health therapy. VSS throughout session.      Anticipated Discharge Disposition (PT): home with assist, home with home health

## 2023-07-12 NOTE — THERAPY EVALUATION
Patient Name: Dary Fabian  : 1935    MRN: [id number]                              Today's Date: 2023       Admit Date: 2023    Visit Dx:     ICD-10-CM ICD-9-CM   1. NSTEMI (non-ST elevated myocardial infarction)  I21.4 410.70   2. Unstable angina  I20.0 411.1   3. Status post angioplasty with stent  Z95.820 V45.89   4. Coronary artery disease involving native coronary artery of native heart with unstable angina pectoris  I25.110 414.01     411.1     Patient Active Problem List   Diagnosis    NSTEMI (non-ST elevated myocardial infarction)    Unstable angina    Status post angioplasty with stent    Coronary artery disease involving native coronary artery of native heart with unstable angina pectoris     Past Medical History:   Diagnosis Date    Coronary artery disease     Elevated cholesterol     Hypertension      Past Surgical History:   Procedure Laterality Date    CARDIAC CATHETERIZATION N/A 2023    Procedure: Left Heart Cath and coronary angiogram;  Surgeon: Yoana Vizcarra MD;  Location: [facility] CATH INVASIVE LOCATION;  Service: Cardiovascular;  Laterality: N/A;    HYSTERECTOMY        General Information       Row Name 23 1553          OT Time and Intention    Document Type evaluation  -MS     Mode of Treatment occupational therapy  -MS       Row Name 23 1553          General Information    Patient Profile Reviewed yes  -MS     Prior Level of Function independent:;ADL's;all household mobility;transfer;min assist:;bathing  daughter assist with bathing, shower chair utilized in walk in shower  -MS     Existing Precautions/Restrictions fall  anticipate sternal precautions  -MS     Barriers to Rehab medically complex  -MS       Row Name 23 1553          Occupational Profile    Reason for Services/Referral (Occupational Profile) Pt is an 86 y/o F admitted to [facility] 23 with NSTEMI. Pt is seen preoperatively this date, awaiting CABG by Dr. Hylton. PMHx significant for CAD  and HTN. At baseline pt resides with daughter in ground floor apartment with 2 [address]. Pt typically independent with most ADLs, daughter assist with bathing, use of rollator for mobility. Pt reports 5-6 falls within last 6 mos.  -MS     Environmental Supports and Barriers (Occupational Profile) supportive family, lives daughter  -MS       Row Name 07/12/23 1553          Living Environment    People in Home child(tevin), adult  -MS       Row Name 07/12/23 1553          Home Main Entrance    Number of Stairs, Main Entrance two  -MS       Row Name 07/12/23 1553          Stairs Within Home, Primary    Number of Stairs, Within Home, Primary none  -MS       Row Name 07/12/23 1553          Cognition    Orientation Status (Cognition) oriented x 4  -MS       Row Name 07/12/23 1553          Safety Issues, Functional Mobility    Safety Issues Affecting Function (Mobility) positioning of assistive device  -MS     Impairments Affecting Function (Mobility) balance;endurance/activity tolerance  -MS               User Key  (r) = Recorded By, (t) = Taken By, (c) = Cosigned By      Initials Name Provider Type    MS Ashli Denton OT Occupational Therapist                     Mobility/ADL's       Row Name 07/12/23 1555          Bed Mobility    All Activities, [place] (Bed Mobility) not tested  -MS     Comment, (Bed Mobility) pt in bathroom upon arrival, sitting up in chair post session  -MS       Row Name 07/12/23 9065          Transfers    Transfers sit-stand transfer  -MS       Row Name 07/12/23 1555          Sit-Stand Transfer    Sit-Stand [place] (Transfers) standby assist;contact guard  -MS     Assistive Device (Sit-Stand Transfers) walker, front-wheeled  -MS       Row Name 07/12/23 1555          Activities of Daily Living    BADL Assessment/Intervention toileting  -MS       Row Name 07/12/23 1555          Toileting Assessment/Training    [place] Level (Toileting) adjust/manage clothing;perform perineal hygiene;standby  assist  -MS     Assistive Devices (Toileting) commode  -MS               User Key  (r) = Recorded By, (t) = Taken By, (c) = Cosigned By      Initials Name Provider Type    Ashli Mariee OT Occupational Therapist                   Obj/Interventions       Row Name 07/12/23 1556          Sensory Assessment (Somatosensory)    Sensory Assessment (Somatosensory) sensation intact  -MS       Row Name 07/12/23 1556          Vision Assessment/Intervention    Visual Impairment/Limitations WNL  -MS       Row Name 07/12/23 1556          Range of Motion Comprehensive    General Range of Motion bilateral upper extremity ROM WFL  -MS       Row Name 07/12/23 1556          Strength Comprehensive (MMT)    Comment, General Manual Muscle Testing (MMT) Assessment BUE grossly 4-/5  -MS       Row Name 07/12/23 1556          Balance    Balance Assessment sitting static balance;sitting dynamic balance;standing static balance;standing dynamic balance  -MS     Static Sitting Balance independent  -MS     Dynamic Sitting Balance independent  -MS     Position, Sitting Balance sitting in chair;supported  -MS     Static Standing Balance standby assist  -MS     Dynamic Standing Balance contact guard;minimal assist  -MS     Position/Device Used, Standing Balance supported;walker, front-wheeled  -MS     Comment, Balance decrease balance with functional mobility, fair navigation of RW  -MS               User Key  (r) = Recorded By, (t) = Taken By, (c) = Cosigned By      Initials Name Provider Type    Ashli Mariee OT Occupational Therapist                   Goals/Plan       Row Name 07/12/23 1600          Bed Mobility Goal 1 (OT)    Activity/Assistive Device (Bed Mobility Goal 1, OT) bed mobility activities, all  -MS     [place] Level/Cues Needed (Bed Mobility Goal 1, OT) modified independence  -MS     Time Frame (Bed Mobility Goal 1, OT) long term goal (LTG);2 weeks  -MS     Progress/Outcomes (Bed Mobility Goal 1, OT) new goal  -MS        Row Name 07/12/23 1600          Transfer Goal 1 (OT)    Activity/Assistive Device (Transfer Goal 1, OT) transfers, all  -MS     [place] Level/Cues Needed (Transfer Goal 1, OT) modified independence  -MS     Time Frame (Transfer Goal 1, OT) long term goal (LTG);2 weeks  -MS     Progress/Outcome (Transfer Goal 1, OT) new goal  -MS       Row Name 07/12/23 1600          Dressing Goal 1 (OT)    Activity/Device (Dressing Goal 1, OT) dressing skills, all  -MS     [place]/Cues Needed (Dressing Goal 1, OT) modified independence  -MS     Time Frame (Dressing Goal 1, OT) long term goal (LTG);2 weeks  -MS     Progress/Outcome (Dressing Goal 1, OT) new goal  -MS       Row Name 07/12/23 1600          Toileting Goal 1 (OT)    Activity/Device (Toileting Goal 1, OT) toileting skills, all  -MS     [place] Level/Cues Needed (Toileting Goal 1, OT) modified independence  -MS     Time Frame (Toileting Goal 1, OT) long term goal (LTG);2 weeks  -MS     Progress/Outcome (Toileting Goal 1, OT) new goal  -MS       Row Name 07/12/23 1600          Grooming Goal 1 (OT)    Activity/Device (Grooming Goal 1, OT) grooming skills, all  -MS     [place] (Grooming Goal 1, OT) modified independence  in standing  -MS     Time Frame (Grooming Goal 1, OT) long term goal (LTG);2 weeks  -MS     Progress/Outcome (Grooming Goal 1, OT) new goal  -MS       Row Name 07/12/23 1600          Therapy Assessment/Plan (OT)    Planned Therapy Interventions (OT) activity tolerance training;adaptive equipment training;BADL retraining;functional balance retraining;IADL retraining;occupation/activity based interventions;patient/caregiver education/training;transfer/mobility retraining;ROM/therapeutic exercise  -MS               User Key  (r) = Recorded By, (t) = Taken By, (c) = Cosigned By      Initials Name Provider Type    Ashli Mariee, OT Occupational Therapist                   Clinical Impression       Row Name 07/12/23 1456          Pain  Assessment    Pretreatment Pain Rating 0/10 - no pain  -MS     Posttreatment Pain Rating 0/10 - no pain  -MS       Row Name 07/12/23 1556          Plan of Care Review    Plan of Care Reviewed With patient;daughter  -MS     Progress no change  -MS     Outcome Evaluation Pt is an 86 y/o F admitted to [facility] 7/11/23 with NSTEMI. Pt is seen preoperatively this date, awaiting CABG. PMHx significant for CAD and HTN. At baseline pt resides with daughter in ground floor apartment with 2 [address]. Pt typically independent with most ADLs, daughter assist with bathing, use of rollator for mobility. Pt daughter works, therefore pt is home alone during day. Pt reports 5-6 falls within last 6 mos. This date pt A&Ox4. Pt with [facility] staff in bathroom upon arrial, with family at bedside throughout sesison. Pt required SBA for toileting task, secondary to line management and IV pole. Pt demos fair navigation of RW and demo decreased balance with functional mobility requiring CGA-Laney and verbal cues. Pt educated on sternal precautions and verbalized understanding, in preparation of CABG. Anticipate pt to progress well, likely to be safe to d/c home with family assist and HHOT to return to PLOF to max potential. OT will re-evaluate pt post operation. OT to follow while admitted.  -MS       Row Name 07/12/23 9886          Therapy Assessment/Plan (OT)    Rehab Potential (OT) good, to achieve stated therapy goals  -MS     Criteria for Skilled Therapeutic Interventions Met (OT) yes;meets criteria;skilled treatment is necessary  -MS     Therapy Frequency (OT) 5 times/wk  -MS     Predicted Duration of Therapy Intervention (OT) until d/c  -MS       Row Name 07/12/23 1746          Therapy Plan Review/Discharge Plan (OT)    Anticipated Discharge Disposition (OT) home with assist;home with home health  -MS       Row Name 07/12/23 7606          Vital Signs    O2 Delivery Pre Treatment room air  -MS     O2 Delivery Intra Treatment room air  -MS     O2  Delivery Post Treatment room air  -MS     Pre Patient Position Standing  -MS     Intra Patient Position Standing  -MS     Post Patient Position Sitting  -MS     Recovery Time VSS  -MS       Row Name 07/12/23 1556          Positioning and Restraints    Pre-Treatment Position bathroom  -MS     Post Treatment Position chair  -MS     In Chair notified nsg;reclined;call light within reach;encouraged to call for assist;exit alarm on;with family/caregiver;legs elevated  -MS               User Key  (r) = Recorded By, (t) = Taken By, (c) = Cosigned By      Initials Name Provider Type    MS Ashli Denton, OT Occupational Therapist                   Outcome Measures       Row Name 07/12/23 1601          How much help from another is currently needed...    Putting on and taking off regular lower body clothing? 4  -MS     Bathing (including washing, rinsing, and drying) 3  -MS     Toileting (which includes using toilet bed pan or urinal) 3  -MS     Putting on and taking off regular upper body clothing 4  -MS     Taking care of personal grooming (such as brushing teeth) 4  -MS     Eating meals 4  -MS     AM-PAC 6 Clicks Score (OT) 22  -MS       Row Name 07/12/23 1415          How much help from another person do you currently need...    Turning from your back to your side while in flat bed without using bedrails? 3  -EJ     Moving from lying on back to sitting on the side of a flat bed without bedrails? 3  -EJ     Moving to and from a bed to a chair (including a wheelchair)? 3  -EJ     Standing up from a chair using your arms (e.g., wheelchair, bedside chair)? 3  -EJ     Climbing 3-5 steps with a railing? 3  -EJ     To walk in hospital room? 3  -EJ     AM-PAC 6 Clicks Score (PT) 18  -EJ     Highest level of mobility 6 --> Walked 10 steps or more  -EJ       Row Name 07/12/23 1601 07/12/23 1415       Functional Assessment    Outcome Measure Options AM-PAC 6 Clicks Daily Activity (OT)  -MS AM-PAC 6 Clicks Basic Mobility (PT)  -EJ               User Key  (r) = Recorded By, (t) = Taken By, (c) = Cosigned By      Initials Name Provider Type    EJ Alyssa Logan, PT Physical Therapist    Ashli Mariee OT Occupational Therapist                    Occupational Therapy Education       Title: PT OT SLP Therapies (In Progress)       Topic: Occupational Therapy (In Progress)       Point: ADL training (Done)       Description:   Instruct learner(s) on proper safety adaptation and remediation techniques during self care or transfers.   Instruct in proper use of assistive devices.                  Learning Progress Summary             Patient Acceptance, E,TB, VU by MS at 7/12/2023 1602                         Point: Home exercise program (Not Started)       Description:   Instruct learner(s) on appropriate technique for monitoring, assisting and/or progressing therapeutic exercises/activities.                  Learner Progress:  Not documented in this visit.              Point: Precautions (Done)       Description:   Instruct learner(s) on prescribed precautions during self-care and functional transfers.                  Learning Progress Summary             Patient Acceptance, E,TB, VU by MS at 7/12/2023 1602                         Point: Body mechanics (Done)       Description:   Instruct learner(s) on proper positioning and spine alignment during self-care, functional mobility activities and/or exercises.                  Learning Progress Summary             Patient Acceptance, E,TB, VU by MS at 7/12/2023 1602                                         User Key       Initials Effective Dates Name Provider Type Discipline    MS 07/13/22 -  Ashli Denton OT Occupational Therapist OT                  OT Recommendation and Plan  Planned Therapy Interventions (OT): activity tolerance training, adaptive equipment training, BADL retraining, functional balance retraining, IADL retraining, occupation/activity based interventions, patient/caregiver  education/training, transfer/mobility retraining, ROM/therapeutic exercise  Therapy Frequency (OT): 5 times/wk  Plan of Care Review  Plan of Care Reviewed With: patient, daughter  Progress: no change  Outcome Evaluation: Pt is an 86 y/o F admitted to [facility] 7/11/23 with NSTEMI. Pt is seen preoperatively this date, awaiting CABG. PMHx significant for CAD and HTN. At baseline pt resides with daughter in ground floor apartment with 2 [address]. Pt typically independent with most ADLs, daughter assist with bathing, use of rollator for mobility. Pt daughter works, therefore pt is home alone during day. Pt reports 5-6 falls within last 6 mos. This date pt A&Ox4. Pt with [facility] staff in bathroom upon arrial, with family at bedside throughout [facility]. Pt required SBA for toileting task, secondary to line management and IV pole. Pt demos fair navigation of RW and demo decreased balance with functional mobility requiring CGA-Laney and verbal cues. Pt educated on sternal precautions and verbalized understanding, in preparation of CABG. Anticipate pt to progress well, likely to be safe to d/c home with family assist and HHOT to return to PLOF to max potential. OT will re-evaluate pt post operation. OT to follow while admitted.     Time Calculation:              Ashli Denton OT  7/12/2023

## 2023-07-12 NOTE — CASE MANAGEMENT/SOCIAL WORK
Continued Stay Note  MAHOGANY Norris     Patient Name: Dary Fabian  MRN: [id number]  Today's Date: 7/12/2023    Admit Date: 7/11/2023    Plan: DC Plan: Pending Clinical Course and PT/OT evaluations. From home with daughter. CABG workup.   Discharge Plan       Row Name 07/12/23 1252       Plan    Plan DC Plan: Pending Clinical Course and PT/OT evaluations. From home with daughter. CABG workup.    Provided Post Acute Provider List? N/A    Provided Post Acute Provider Quality & Resource List? N/A    Plan Comments CM spoke with patient's nurse and [facility] NP Mary Walton to obtain clinical updates. Patient currently being worked up for possible open heart surgery. CM will continue to follow for any further needs and adjust discharge plan accordingly. DC Barriers: Cardiac monitoring, Heparin gtt, and pending tests.               Expected Discharge Date and Time       Expected Discharge Date Expected Discharge Time    Jul 19, 2023        Tiffanie Teresa RN    Office Phone: [phone]  Office Cell:     [phone]

## 2023-07-12 NOTE — PLAN OF CARE
Goal Outcome Evaluation:  Plan of Care Reviewed With: patient, daughter        Progress: no change  Outcome Evaluation: Pt is an 88 y/o F admitted to [facility] 7/11/23 with NSTEMI. Pt is seen preoperatively this date, awaiting CABG. PMHx significant for CAD and HTN. At baseline pt resides with daughter in ground floor apartment with 2 [address]. Pt typically independent with most ADLs, daughter assist with bathing, use of rollator for mobility. Pt daughter works, therefore pt is home alone during day. Pt reports 5-6 falls within last 6 mos. This date pt A&Ox4. Pt with [facility] staff in bathroom upon arrial, with family at bedside throughout [facility]. Pt required SBA for toileting task, secondary to line management and IV pole. Pt demos fair navigation of RW and demo decreased balance with functional mobility requiring CGA-Laney and verbal cues. Pt educated on sternal precautions and verbalized understanding, in preparation of CABG. Anticipate pt to progress well, likely to be safe to d/c home with family assist and HHOT to return to PLOF to max potential. OT will re-evaluate pt post operation. OT to follow while admitted.      Anticipated Discharge Disposition (OT): home with assist, home with home health

## 2023-07-12 NOTE — CONSULTS
Patient Care Team:  Provider, No Known as PCP - General  Referring Provider:  Dr. Vizcarra  Reason for consultation:  MV CAD/NSTEMI    Chief complaint:  Chest pain    Subjective     History of Present Illness:   Ms. Fabian is an 87 year-old female with PMH of HTN and CAD with previous PCI in 2011 who presented to the ED on 7/11/23 with a chief complaint of chest pain. She describes the chest pain as a substernal burning & pressure that occurred at rest and was accompanied by dyspnea and nausea. She also reports feeling more fatigued lately. IN the ED her troponin was found to be elevated and she was ruled in for a NSTEMI. Cardiology was consulted and cardiac cath was performed revealing EF 30-35%, 2+ mitral regurgitation, and severe 3 vessel CAD with 50% left main disease. Transthoracic echocardiogram also revealed EF 31-35% and mild mitral regurgitation. Cardiac Surgery has been consulted for surgical evaluation.       Review of Systems   Constitutional:  Positive for activity change and fatigue.   Respiratory:  Positive for shortness of breath.    Cardiovascular:  Positive for chest pain.   All other systems reviewed and are negative.     Past Medical History:   Diagnosis Date    Coronary artery disease     Elevated cholesterol     Hypertension      CAD with PCI  HTN    Past Surgical History:   Procedure Laterality Date    CARDIAC CATHETERIZATION N/A 07/11/2023    Procedure: Left Heart Cath and coronary angiogram;  Surgeon: Yoana Vizcarra MD;  Location: [facility] INVASIVE LOCATION;  Service: Cardiovascular;  Laterality: N/A;    HYSTERECTOMY       History reviewed. No pertinent family history.  Social History     Tobacco Use    Smoking status: Never    Smokeless tobacco: Never   Vaping Use    Vaping Use: Never used   Substance Use Topics    Alcohol use: Never    Drug use: Never     Medications Prior to Admission   Medication Sig Dispense Refill Last Dose    acetaminophen (TYLENOL) 325 MG tablet Take 2 tablets  "by mouth Every 6 (Six) Hours As Needed for Mild Pain.        aspirin, 81 mg, Oral, Daily  atorvastatin, 80 mg, Oral, Daily  [START ON 7/13/2023] ceFAZolin, 2,000 mg, Intravenous, Once  [START ON 7/13/2023] chlorhexidine, 15 mL, Mouth/Throat, Once  metoprolol succinate XL, 25 mg, Oral, Q24H  [START ON 7/13/2023] metoprolol tartrate, 12.5 mg, Oral, On Call to OR  mupirocin, 1 application , Each Nare, Q12H  sodium chloride, 10 mL, Intravenous, Q12H  sodium chloride, 10 mL, Intravenous, Q12H      Allergies:  Patient has no known allergies.    Objective      Vital Signs  Temp:  [97.4 °F (36.3 °C)-98.6 °F (37 °C)] 97.4 °F (36.3 °C)  Heart Rate:  [] 79  Resp:  [12-24] 16  BP: ()/(48-91) 130/66    Flowsheet Rows      Flowsheet Row First Filed Value   Admission Height 152.4 cm (60\") Documented at 07/11/2023 0053   Admission Weight 56.7 kg (125 lb) Documented at 07/11/2023 0053          152.4 cm (60\")    Physical Exam  Vitals and nursing note reviewed.   Constitutional:       General: She is not in acute distress.     Appearance: Normal appearance. She is normal weight. She is not ill-appearing.   HENT:      Head: Normocephalic and atraumatic.      Nose: Nose normal.      Mouth/Throat:      Mouth: Mucous membranes are moist.      Pharynx: Oropharynx is clear.   Eyes:      Extraocular Movements: Extraocular movements intact.      Conjunctiva/sclera: Conjunctivae normal.      Pupils: Pupils are equal, round, and reactive to light.      Comments: Wears glasses   Cardiovascular:      Rate and Rhythm: Normal rate and regular rhythm.      Heart sounds: Murmur heard.   Pulmonary:      Effort: Pulmonary effort is normal. No respiratory distress.      Breath sounds: Normal breath sounds. No wheezing.   Abdominal:      General: Bowel sounds are normal.      Palpations: Abdomen is soft.   Musculoskeletal:         General: Normal range of motion.      Cervical back: Normal range of motion and neck supple.      Right lower " leg: No edema.      Left lower leg: Edema (trace) present.   Skin:     General: Skin is warm and dry.   Neurological:      General: No focal deficit present.      Mental Status: She is alert and oriented to person, place, and time.   Psychiatric:         Mood and Affect: Mood normal.         Behavior: Behavior normal.         Thought Content: Thought content normal.       Results Review:   Lab Results (last 24 hours)       Procedure Component Value Units Date/Time    BNP [[id number]]  (Abnormal) Collected: 07/12/23 1445    Specimen: Blood Updated: 07/12/23 1518     proBNP 22,989.0 pg/mL     Narrative:      Among patients with dyspnea, NT-proBNP is highly sensitive for the detection of acute congestive heart failure. In addition NT-proBNP of <300 pg/ml effectively rules out acute congestive heart failure with 99% negative predictive value.    Results may be falsely decreased if patient taking Biotin.      Platelet Function ADP [[id number]]  (Normal) Collected: 07/12/23 1448    Specimen: Blood Updated: 07/12/23 1455     ADP Aggregation, % Platelet 97 %     Magnesium [[id number]]  (Normal) Collected: 07/12/23 1031    Specimen: Blood Updated: 07/12/23 1121     Magnesium 1.8 mg/dL     Basic Metabolic Panel [[id number]]  (Abnormal) Collected: 07/12/23 1031    Specimen: Blood Updated: 07/12/23 1121     Glucose 230 mg/dL      BUN 15 mg/dL      Creatinine 0.69 mg/dL      Sodium 137 mmol/L      Potassium 3.6 mmol/L      Chloride 103 mmol/L      CO2 23.0 mmol/L      Calcium 8.7 mg/dL      BUN/Creatinine Ratio 21.7     Anion Gap 11.0 mmol/L      eGFR 84.1 mL/min/1.73     Narrative:      GFR Normal >60  Chronic Kidney Disease <60  Kidney Failure <15    The GFR formula is only valid for adults with stable renal function between ages 18 and 70.    aPTT [[id number]]  (Abnormal) Collected: 07/12/23 1031    Specimen: Blood Updated: 07/12/23 1106     PTT 54.7 seconds     CBC (No Diff) [[id number]]  (Normal) Collected: 07/12/23 1031     Specimen: Blood Updated: 07/12/23 1046     WBC 8.30 10*3/mm3      RBC 4.38 10*6/mm3      Hemoglobin 13.2 g/dL      Hematocrit 39.7 %      MCV 90.7 fL      MCH 30.1 pg      MCHC 33.2 g/dL      RDW 13.2 %      RDW-SD 44.6 fl      MPV 8.2 fL      Platelets 195 10*3/mm3     TSH [[id number]]  (Normal) Collected: 07/12/23 0417    Specimen: Blood Updated: 07/12/23 0521     TSH 1.760 uIU/mL     Magnesium [[id number]]  (Normal) Collected: 07/12/23 0417    Specimen: Blood Updated: 07/12/23 0518     Magnesium 2.0 mg/dL     Protime-INR [[id number]]  (Normal) Collected: 07/12/23 0417    Specimen: Blood Updated: 07/12/23 0505     Protime 10.7 Seconds      INR 1.00    aPTT [[id number]]  (Abnormal) Collected: 07/12/23 0417    Specimen: Blood Updated: 07/12/23 0505     PTT 31.6 seconds     CBC (No Diff) [[id number]]  (Normal) Collected: 07/12/23 0417    Specimen: Blood Updated: 07/12/23 0452     WBC 8.50 10*3/mm3      RBC 4.48 10*6/mm3      Hemoglobin 13.5 g/dL      Hematocrit 40.9 %      MCV 91.2 fL      MCH 30.1 pg      MCHC 33.0 g/dL      RDW 13.2 %      RDW-SD 44.6 fl      MPV 9.3 fL      Platelets 187 10*3/mm3     aPTT [[id number]]  (Abnormal) Collected: 07/11/23 1918    Specimen: Blood Updated: 07/11/23 1949     PTT 27.9 seconds     Magnesium [[id number]]  (Normal) Collected: 07/11/23 1918    Specimen: Blood Updated: 07/11/23 1943     Magnesium 1.9 mg/dL           CARDIAC CATHETERIZATION 7/11/23:  1. HEMODYNAMICS:  Left ventricle end-diastolic pressure is normal.  Gradient of 20 mmHg was noted.     2. LEFT VENTRICULOGRAPHY:  Left ventricular enlargement with apical and periapical severe hypokinesis with ejection fraction of 30 to 35%.  2+ mitral regurgitation is present.        3. CORONARY ANGIOGRAPHY:  Left main coronary artery has distal 50% disease.  Left anterior descending artery has ostial 99% disease.  Mid segment has 50 to 60% disease.  Diagonal branch is a small caliber vessel that has 50 to 60% disease.  Circumflex coronary  artery is a large vessel but nondominant and has proximal 95% disease.  Right coronary artery is a large and dominant vessel and has ostial 99% disease and diffuse 50% disease in the mid and distal segments.  Left ventricular branch and PDA has 70 to 80% disease.    TRANSTHORACIC ECHOCARDIOGRAM 7/11/23:    Left ventricular ejection fraction appears to be 31 - 35%.    Estimated right ventricular systolic pressure from tricuspid regurgitation is mildly elevated (35-45 mmHg).        Assessment & Plan    Severe 3 vessel CAD with 50% LM disease---Heparin drip   NSTEMI  Mild to moderate MR  Severely depressed LV systolic function---EF 30-35%  HTN  Remote hx of tobacco abuse---quit 50 years ago       Patient seen and evaluated by Dr. Galindo who recommends off pump CABG. Plan for surgery tomorrow morning. Pre-op labs and diagnostics including vein mapping and carotid duplex have been placed. Heparin drip should be discontinued On Call to OR. NPO after midnight.       JERRY Hill  07/12/23  15:56 EDT

## 2023-07-13 NOTE — PROGRESS NOTES
Referring Provider: Ventura William MD    Reason for follow-up:  Non-STEMI  Severe and critical triple-vessel coronary artery disease.  Left ventricular dysfunction  Status post stent     Patient Care Team:  Provider, No Known as PCP - General    Subjective .      ROS    Since I have last seen, the patient has been without any chest discomfort ,shortness of breath, palpitations, dizziness or syncope.  Denies having any headache ,abdominal pain ,nausea, vomiting , diarrhea constipation, loss of weight or loss of appetite.  Denies having any excessive bruising ,hematuria or blood in the stool.    Review of all systems negative except as indicated    History  Past Medical History:   Diagnosis Date    Coronary artery disease     Elevated cholesterol     Hypertension        Past Surgical History:   Procedure Laterality Date    CARDIAC CATHETERIZATION N/A 07/11/2023    Procedure: Left Heart Cath and coronary angiogram;  Surgeon: Yoana Vizcarra MD;  Location: [facility] CATH INVASIVE LOCATION;  Service: Cardiovascular;  Laterality: N/A;    HYSTERECTOMY         History reviewed. No pertinent family history.    Social History     Tobacco Use    Smoking status: Never    Smokeless tobacco: Never   Vaping Use    Vaping Use: Never used   Substance Use Topics    Alcohol use: Never    Drug use: Never        Medications Prior to Admission   Medication Sig Dispense Refill Last Dose    acetaminophen (TYLENOL) 325 MG tablet Take 2 tablets by mouth Every 6 (Six) Hours As Needed for Mild Pain.          Allergies  Patient has no known allergies.    Scheduled Meds:aspirin, 81 mg, Oral, Daily  atorvastatin, 80 mg, Oral, Daily  ceFAZolin, 2,000 mg, Intravenous, Once  metoprolol succinate XL, 25 mg, Oral, Q24H  sodium chloride, 10 mL, Intravenous, Q12H  sodium chloride, 10 mL, Intravenous, Q12H      Continuous Infusions:heparin, 12 Units/kg/hr, Last Rate: 14 Units/kg/hr (07/13/23 0108)  lactated ringers, 50 mL/hr, Last Rate: 50 mL/hr  "(07/12/23 1903)      PRN Meds:.  acetaminophen    acetaminophen    atropine    Calcium Replacement - Follow Nurse / BPA Driven Protocol    Chlorhexidine Gluconate Cloth    diphenhydrAMINE    diphenhydrAMINE    heparin    heparin    Magnesium Standard Dose Replacement - Follow Nurse / BPA Driven Protocol    Morphine    nitroglycerin    ondansetron **OR** ondansetron    Phosphorus Replacement - Follow Nurse / BPA Driven Protocol    Potassium Replacement - Follow Nurse / BPA Driven Protocol    sodium chloride    sodium chloride    sodium chloride    sodium chloride    sodium chloride    Objective     VITAL SIGNS  Vitals:    07/13/23 0300 07/13/23 0400 07/13/23 0500 07/13/23 0519   BP: 90/46 125/66  124/70   Pulse: 82 88  91   Resp:       Temp:   97.8 °F (36.6 °C)    TempSrc:   Oral    SpO2:  96%     Weight:   55.8 kg (123 lb)    Height:           Flowsheet Rows      Flowsheet Row First Filed Value   Admission Height 152.4 cm (60\") Documented at 07/11/2023 0053   Admission Weight 56.7 kg (125 lb) Documented at 07/11/2023 0053              Intake/Output Summary (Last 24 hours) at 7/13/2023 0617  Last data filed at 7/13/2023 0559  Gross per 24 hour   Intake 1577 ml   Output 1300 ml   Net 277 ml          TELEMETRY: Sinus rhythm    Physical Exam:  The patient is alert, oriented and in no distress.  Vital signs as noted above.  Head and neck revealed no carotid bruits or jugular venous distention.  No thyromegaly or lymphadenopathy is present  Lungs clear.  No wheezing.  Breath sounds are normal bilaterally.  Heart normal first and second heart sounds.  No murmur. No precordial rub is present.  No gallop is present.  Abdomen soft and nontender.  No organomegaly is present.  Extremities with good peripheral pulses without any pedal edema.  Cardiac catheterization site looks normal.  Skin warm and dry.  Musculoskeletal system is grossly normal  CNS grossly normal    Reviewed and updated    Results Review:   I reviewed the " patient's new clinical results.  Lab Results (last 24 hours)       Procedure Component Value Units Date/Time    POC Glucose Once [[id number]]  (Normal) Collected: 07/13/23 0503    Specimen: Blood Updated: 07/13/23 0504     Glucose 99 mg/dL      Comment: Serial Number: [id number]:  [id number]       Blood Gas, Arterial - [[id number]]  (Abnormal) Collected: 07/13/23 0307    Specimen: Arterial Blood Updated: 07/13/23 0309     Site Right Radial     Arnold's Test Positive     pH, Arterial 7.493 pH units      pCO2, Arterial 32.7 mm Hg      pO2, Arterial 85.4 mm Hg      HCO3, Arterial 25.0 mmol/L      Base Excess, Arterial 2.1 mmol/L      Comment: Serial Number: [id number]:  [id number]        O2 Saturation, Arterial 97.4 %      CO2 Content 26.1 mmol/L      Barometric Pressure for Blood Gas --     Comment: N/A        Modality Room Air     FIO2 21 %      Hemodilution No    POC Glucose Once [[id number]]  (Abnormal) Collected: 07/13/23 0307    Specimen: Blood Updated: 07/13/23 0309     Glucose 107 mg/dL      Comment: Serial Number: [id number]:  [id number]       Basic Metabolic Panel [[id number]]  (Abnormal) Collected: 07/13/23 0020    Specimen: Blood Updated: 07/13/23 0045     Glucose 118 mg/dL      BUN 19 mg/dL      Creatinine 0.79 mg/dL      Sodium 140 mmol/L      Potassium 4.0 mmol/L      Chloride 106 mmol/L      CO2 25.0 mmol/L      Calcium 8.3 mg/dL      BUN/Creatinine Ratio 24.1     Anion Gap 9.0 mmol/L      eGFR 72.5 mL/min/1.73     Narrative:      GFR Normal >60  Chronic Kidney Disease <60  Kidney Failure <15    The GFR formula is only valid for adults with stable renal function between ages 18 and 70.    aPTT [[id number]]  (Abnormal) Collected: 07/13/23 0020    Specimen: Blood Updated: 07/13/23 0037     PTT 81.6 seconds     CBC (No Diff) [[id number]]  (Normal) Collected: 07/13/23 0020    Specimen: Blood Updated: 07/13/23 0027     WBC 7.30 10*3/mm3      RBC 4.12 10*6/mm3      Hemoglobin 12.3 g/dL      Hematocrit 37.8 %       MCV 91.9 fL      MCH 30.0 pg      MCHC 32.6 g/dL      RDW 13.4 %      RDW-SD 42.9 fl      MPV 8.0 fL      Platelets 187 10*3/mm3     aPTT [[id number]]  (Normal) Collected: 07/12/23 1820    Specimen: Blood Updated: 07/12/23 1949     PTT 69.3 seconds     MRSA Screen, PCR (Inpatient) - Swab, Nares [[id number]]  (Normal) Collected: 07/12/23 1756    Specimen: Swab from Nares Updated: 07/12/23 1933     MRSA PCR No MRSA Detected    Narrative:      The negative predictive value of this diagnostic test is high and should only be used to consider de-escalating anti-MRSA therapy. A positive result may indicate colonization with MRSA and must be correlated clinically.    COVID PRE-OP / PRE-PROCEDURE SCREENING ORDER (NO ISOLATION) - Swab, Nasopharynx [[id number]]  (Normal) Collected: 07/12/23 1756    Specimen: Swab from Nasopharynx Updated: 07/12/23 1859    Narrative:      The following orders were created for panel order COVID PRE-OP / PRE-PROCEDURE SCREENING ORDER (NO ISOLATION) - Swab, Nasopharynx.  Procedure                               Abnormality         Status                     ---------                               -----------         ------                     COVID-19,CEPHEID/CONSUELO,CO...[[id number]]  Normal              Final result                 Please view results for these tests on the individual orders.    COVID-19,CEPHEID/CONSUELO,COR/IRA/PAD/JOHN/MAD IN-HOUSE(OR EMERGENT/ADD-ON),NP SWAB IN TRANSPORT MEDIA 3-4 HR TAT, RT-PCR - Swab, Nasopharynx [[id number]]  (Normal) Collected: 07/12/23 1756    Specimen: Swab from Nasopharynx Updated: 07/12/23 1859     COVID19 Not Detected    Narrative:      Fact sheet for providers: https://www.fda.gov/media/314239/download     Fact sheet for patients: https://www.fda.gov/media/682339/download  Fact sheet for providers: https://www.fda.gov/media/396722/download     Fact sheet for patients: https://www.fda.gov/media/032822/download    Urinalysis, Microscopic Only - Urine, Clean Catch  [[id number]]  (Abnormal) Collected: 07/12/23 1646    Specimen: Urine, Clean Catch Updated: 07/12/23 1857     RBC, UA 0-2 /HPF      WBC, UA 6-12 /HPF      Bacteria, UA None Seen /HPF      Squamous Epithelial Cells, UA 0-2 /HPF      Hyaline Casts, UA None Seen /LPF      Methodology Automated Microscopy    Urinalysis With Culture If Indicated - Urine, Clean Catch [[id number]]  (Abnormal) Collected: 07/12/23 1646    Specimen: Urine, Clean Catch Updated: 07/12/23 1857     Color, UA Yellow     Appearance, UA Clear     pH, UA 5.5     Specific Gravity, UA 1.016     Glucose, UA Negative     Ketones, UA Negative     Bilirubin, UA Negative     Blood, UA Negative     Protein, UA Negative     Leuk Esterase, UA Small (1+)     Nitrite, UA Negative     Urobilinogen, UA 1.0 E.U./dL    Narrative:      In absence of clinical symptoms, the presence of pyuria, bacteria, and/or nitrites on the urinalysis result does not correlate with infection.    Urine Culture - Urine, Urine, Clean Catch [[id number]] Collected: 07/12/23 1646    Specimen: Urine, Clean Catch Updated: 07/12/23 1857    POC Activated Clotting Time [[id number]]  (Normal) Collected: 07/11/23 1706    Specimen: Arterial Blood Updated: 07/12/23 1830     Activated Clotting Time  137 Seconds      Comment: Serial Number: [id number]:  [id number]       BNP [[id number]]  (Abnormal) Collected: 07/12/23 1445    Specimen: Blood Updated: 07/12/23 1518     proBNP 22,989.0 pg/mL     Narrative:      Among patients with dyspnea, NT-proBNP is highly sensitive for the detection of acute congestive heart failure. In addition NT-proBNP of <300 pg/ml effectively rules out acute congestive heart failure with 99% negative predictive value.    Results may be falsely decreased if patient taking Biotin.      Platelet Function ADP [[id number]]  (Normal) Collected: 07/12/23 1448    Specimen: Blood Updated: 07/12/23 1455     ADP Aggregation, % Platelet 97 %     Magnesium [[id number]]  (Normal) Collected:  07/12/23 1031    Specimen: Blood Updated: 07/12/23 1121     Magnesium 1.8 mg/dL     Basic Metabolic Panel [[id number]]  (Abnormal) Collected: 07/12/23 1031    Specimen: Blood Updated: 07/12/23 1121     Glucose 230 mg/dL      BUN 15 mg/dL      Creatinine 0.69 mg/dL      Sodium 137 mmol/L      Potassium 3.6 mmol/L      Chloride 103 mmol/L      CO2 23.0 mmol/L      Calcium 8.7 mg/dL      BUN/Creatinine Ratio 21.7     Anion Gap 11.0 mmol/L      eGFR 84.1 mL/min/1.73     Narrative:      GFR Normal >60  Chronic Kidney Disease <60  Kidney Failure <15    The GFR formula is only valid for adults with stable renal function between ages 18 and 70.    aPTT [[id number]]  (Abnormal) Collected: 07/12/23 1031    Specimen: Blood Updated: 07/12/23 1106     PTT 54.7 seconds     CBC (No Diff) [[id number]]  (Normal) Collected: 07/12/23 1031    Specimen: Blood Updated: 07/12/23 1046     WBC 8.30 10*3/mm3      RBC 4.38 10*6/mm3      Hemoglobin 13.2 g/dL      Hematocrit 39.7 %      MCV 90.7 fL      MCH 30.1 pg      MCHC 33.2 g/dL      RDW 13.2 %      RDW-SD 44.6 fl      MPV 8.2 fL      Platelets 195 10*3/mm3             Imaging Results (Last 24 Hours)       ** No results found for the last 24 hours. **        LAB RESULTS (LAST 7 DAYS)    CBC  Results from last 7 days   Lab Units 07/13/23  0020 07/12/23  1031 07/12/23  0417 07/11/23  0422 07/11/23  0128   WBC 10*3/mm3 7.30 8.30 8.50 10.10 9.40   RBC 10*6/mm3 4.12 4.38 4.48 4.38 4.31   HEMOGLOBIN g/dL 12.3 13.2 13.5 13.1 12.8   HEMATOCRIT % 37.8 39.7 40.9 40.2 38.8   MCV fL 91.9 90.7 91.2 91.8 89.9   PLATELETS 10*3/mm3 187 195 187 215 201         BMP  Results from last 7 days   Lab Units 07/13/23  0020 07/12/23  1031 07/12/23  0417 07/11/23  1918 07/11/23  0422 07/11/23  0128   SODIUM mmol/L 140 137  --   --  140 140   POTASSIUM mmol/L 4.0 3.6  --   --  4.3 4.2   CHLORIDE mmol/L 106 103  --   --  106 106   CO2 mmol/L 25.0 23.0  --   --  22.0 22.0   BUN mg/dL 19 15  --   --  32* 36*   CREATININE  mg/dL 0.79 0.69  --   --  0.80 0.86   GLUCOSE mg/dL 118* 230*  --   --  133* 152*   MAGNESIUM mg/dL  --  1.8 2.0 1.9  --  1.8         CMP   Results from last 7 days   Lab Units 07/13/23  0020 07/12/23  1031 07/11/23  0422 07/11/23  0128   SODIUM mmol/L 140 137 140 140   POTASSIUM mmol/L 4.0 3.6 4.3 4.2   CHLORIDE mmol/L 106 103 106 106   CO2 mmol/L 25.0 23.0 22.0 22.0   BUN mg/dL 19 15 32* 36*   CREATININE mg/dL 0.79 0.69 0.80 0.86   GLUCOSE mg/dL 118* 230* 133* 152*   ALBUMIN g/dL  --   --   --  3.4*   BILIRUBIN mg/dL  --   --   --  0.2   ALK PHOS U/L  --   --   --  66   AST (SGOT) U/L  --   --   --  23   ALT (SGPT) U/L  --   --   --  11           BNP        TROPONIN  Results from last 7 days   Lab Units 07/11/23  0422   HSTROP T ng/L 285*         CoAg  Results from last 7 days   Lab Units 07/13/23  0020 07/12/23  1820 07/12/23  1031 07/12/23  0417 07/11/23  1918 07/11/23  0959 07/11/23  0128   INR   --   --   --  1.00  --   --  0.97   APTT seconds 81.6* 69.3 54.7* 31.6* 27.9* 54.4* 24.2*         Creatinine Clearance  Estimated Creatinine Clearance: 39.3 mL/min (by C-G formula based on SCr of 0.79 mg/dL).    ABG  Results from last 7 days   Lab Units 07/13/23  0307   PH, ARTERIAL pH units 7.493*   PCO2, ARTERIAL mm Hg 32.7*   PO2 ART mm Hg 85.4   O2 SATURATION ART % 97.4   BASE EXCESS ART mmol/L 2.1       Radiology  No radiology results for the last day        EKG      I personally viewed and interpreted the patient's EKG/Telemetry data:    ECHOCARDIOGRAM:    Results for orders placed during the hospital encounter of 07/11/23    Adult Transthoracic Echo Complete W/ Cont if Necessary Per Protocol    Interpretation Summary    Left ventricular ejection fraction appears to be 31 - 35%.    Estimated right ventricular systolic pressure from tricuspid regurgitation is mildly elevated (35-45 mmHg).    Indications  Unstable angina  Non-STEMI    Technically satisfactory study.  Mitral valve is structurally normal.  Mild  mitral regurgitation is present.  Tricuspid valve is structurally normal.  Aortic valve is thickened with adequate opening motion.  Pulmonic valve could not be well visualized.  No evidence for tricuspid or aortic regurgitation is seen by Doppler study.  Left atrium is enlarged.  Right atrium is normal in size.  Left ventricle is enlarged with apical and severe periapical hypokinesis with ejection fraction of 30 to 35%.  Right ventricle is normal in size.  Atrial septum is intact.  Aorta is normal.  No pericardial effusion or intracardiac thrombus is seen.    Impression  Mild mitral regurgitation.  Aortic valve is thickened with adequate opening motion.  Left ventricle is enlarged with apical and severe periapical hypokinesis with ejection fraction of 30 to 35%.          STRESS TEST        Cardiolite (Tc-99m sestamibi) stress test    CARDIAC CATHETERIZATION  Results for orders placed during the hospital encounter of 07/11/23    Cardiac Catheterization/Vascular Study    Narrative  CARDIAC CATHETERIZATION REPORT    DATE OF PROCEDURE:  7/11/2023      INDICATION FOR PROCEDURE:  Non-STEMI  Unstable angina  Status post stent      PROCEDURE PERFORMED:    Left heart catheterization, coronary angiography, left ventriculography    @@  Moderate conscious sedation was utilized with intravenous Versed and fentanyl administered by registered nurse with continuous ECG, pulse oximetry and hemodynamic monitoring supervised by myself throughout the entire procedure.  Conscious sedation time   30 minutes    I was present with the patient for the duration of moderate sedation and supervised staff who had no other duties and monitored the patient for the entire procedure.    @@  PROCEDURE COMMENTS:  Under usual sterile precautions and 1% lidocaine infiltration right femoral artery was percutaneously punctured and a 5 Bulgarian vascular sheath was introduced into the right femoral artery.  Left and right coronary arteriography was performed  in varying degrees of obliquity followed by left ventricular angiogram.  Hemostasis was obtained and patient was returned to the room with intact pulses.  No complications were noted.      FINDINGS:    1. HEMODYNAMICS:  Left ventricle end-diastolic pressure is normal.  Gradient of 20 mmHg was noted.    2. LEFT VENTRICULOGRAPHY:  Left ventricular enlargement with apical and periapical severe hypokinesis with ejection fraction of 30 to 35%.  2+ mitral regurgitation is present.      3. CORONARY ANGIOGRAPHY:  Left main coronary artery has distal 50% disease.  Left anterior descending artery has ostial 99% disease.  Mid segment has 50 to 60% disease.  Diagonal branch is a small caliber vessel that has 50 to 60% disease.  Circumflex coronary artery is a large vessel but nondominant and has proximal 95% disease.  Right coronary artery is a large and dominant vessel and has ostial 99% disease and diffuse 50% disease in the mid and distal segments.  Left ventricular branch and PDA has 70 to 80% disease.      SUMMARY:    Left main coronary artery has distal 50% disease.  Left anterior descending artery has ostial 99% disease.  Mid segment has 50 to 60% disease.  Diagonal branch is a small caliber vessel that has 50 to 60% disease.  Circumflex coronary artery is a large vessel but nondominant and has proximal 95% disease.  Right coronary artery is a large and dominant vessel and has ostial 99% disease and diffuse 50% disease in the mid and distal segments.  Left ventricular branch and PDA has 70 to 80% disease.    RECOMMENDATIONS:  Patient has severe and critical triple-vessel coronary artery disease.  Patient has left ventricular dysfunction likely due to recent stenting effect from probably transient total occlusion of left anterior descending artery.  Patient would benefit from CABG.  Cardiovascular surgery consultation initiated.  Have discussed with Dr. Hylton.  Restart intravenous heparin after sheaths are pulled and  patient is stable without any bleeding.  Have discussed with patient's family daughter and granddaughter.                OTHER:         Assessment & Plan     Principal Problem:    NSTEMI (non-ST elevated myocardial infarction)  Active Problems:    Unstable angina    Status post angioplasty with stent    Coronary artery disease involving native coronary artery of native heart with unstable angina pectoris    ]]]]]]]]]]]]]]]]]]]]]]  Impression  =============  -Unstable angina/non-STEMI    - Severe and critical coronary artery disease.  Cardiac cath 7/11/2023 revealed    Cardiac catheterization 7/11/2023 revealed     Left main coronary artery has distal 50% disease.  Left anterior descending artery has ostial 99% disease.  Mid segment has 50 to 60% disease.  Diagonal branch is a small caliber vessel that has 50 to 60% disease.  Circumflex coronary artery is a large vessel but nondominant and has proximal 95% disease.  Right coronary artery is a large and dominant vessel and has ostial 99% disease and diffuse 50% disease in the mid and distal segments.  Left ventricular branch and PDA has 70 to 80% disease.    - Ischemic cardiomyopathy with ejection fraction of 30 to 35%-likely due to stunning effect.    - Status post stent to LAD 2011 at [facility]-Dr. Moreno.    - Hypertension    - No known allergies  ==============  Plan  ===========  Patient presented with unstable angina pectoris.  Non-STEMI  High-sensitivity troponin 179, repeat 285  EKG shows normal sinus rhythm, rate 97    Patient had cardiac catheterization 7/11/2023 that revealed severe and critical triple-vessel coronary artery disease.  Patient has severe and critical triple-vessel coronary artery disease.    Patient has left ventricular dysfunction likely due to recent stenting effect from probably transient total occlusion of left anterior descending artery.  Hopefully left ventricle function would improve with revascularization.    Patient would  benefit from CABG.  Patient to have CABG off-pump by Dr. Galindo today.    Further plan will depend on patient's progress.  ]]]]]]]]]]]]]]]]]]]]             Yoana Vizcarra MD  07/13/23  06:17 EDT

## 2023-07-13 NOTE — OP NOTE
Operative Note    Date of Dictation: 07/13/23    Date of Procedure: 07/13/23    Referring Physician: Provider, No Known    Preoperative diagnosis: NSTEMI and Multivessel CAD    Postoperative diagnosis: Same    Procedure:   1. CABG x 3 off pump (LIMA to LAD, SVG to OM, SVG to PDA)  2. EVH of the right legs    Surgeon: Elias Galindo MD     Assistants: Berta Guerrier, RITESH was responsible for performing the following activities: Cardiac Surgery First assist, Endoscopic Vein [place] for CABG, surgical wound closure and their skilled assistance was necessary for the success of this case.     Anesthesia: General endotracheal anesthesia and RAMYA    Findings:  The saphenous vein was harvested endoscopically form the right  leg. The vein had a diameter of 4 mm and was of good quality. The coronaries had a diameter of 1.5 mm and were of fair quality.      Estimated Blood Loss:  500mL of Cell Saver returned.    STS Data: The STS Risk Score discussed with the patient and family.  Counseling was done regarding abuse of tobacco, alcohol and drugs as needed. They understand and wish to proceed. The antibiotics and b blockers were given in the STS required window.          Description of the procedure:     The patient was placed supine on the operative table. General anesthesia was given and lines placed. The patient was prepped and draped using the usual sterile technique. A median sternotomy was performed with a scalpel and the layers carried down to the sternum using the electrocautery. The sternum was split in the midline using a vertical oscillating saw. Hemostasis was achieved. The LIMA was harvested skeletonized and prepared with papaverine. It was of good quality. 300 units/kg of IV heparin was given to an ACT over 400. A Favaloro retractor was placed and the mediastinum exposed, the pericardium was opened and the edges tacked to the wound.The LIMA was sewn to the proximal LAD, right at the diagonal ostium, with  7-0 Prolene off pump using a [employer] device.  2 veins were anastomosed to the ascending aorta with 6-0 Prolene and marked with washers using a Heartstring device. The first vein was sewn to the posterior descending artery of the right coronary artery with 7-0 Prolene.  The next vein was sewn to obtuse marginal one of the circunflex artery with 7-0 Prolene. All anastomoses were checked and had good flow and morphology. The left and right pleural space were suctioned. The matching dose of protamine was given. AV temporary wires and pleural and mediastinal chest tubes were placed and the wound sprayed with platelet rich plasma. The sternum was closed with single and double wires and soft tissue in layers of reabsorbable material. The wounds were covered with sterile dressings.       Specimen removed:  none    Complications:  none           Disposition: Cardiovascular recovery room           Condition: Critical but stable.

## 2023-07-13 NOTE — NURSING NOTE
Pt arrived with CI 1.9, one albumin given, index 1.6 with recheck after albumin. Pt +6000ml, Dr DOVER ordered to increase milrinone to 0.25 and not to give anymore fluid at this time. Pacing 90 DDD. Ordered lactic and mixed venous gas

## 2023-07-13 NOTE — SIGNIFICANT NOTE
07/13/23 0736   OTHER   Discipline physical therapist   Rehab Time/Intention   Session Not Performed patient unavailable for treatment  (Pt to undergo CABG today. Will resume PT when medically appropriate.)   Therapy Assessment/Plan (PT)   Criteria for Skilled Interventions Met (PT) yes;skilled treatment is necessary   Recommendation   PT - Next Appointment 07/14/23

## 2023-07-14 PROBLEM — E78.5 HYPERLIPIDEMIA: Status: ACTIVE | Noted: 2023-01-01

## 2023-07-14 PROBLEM — I21.9 MYOCARDIAL INFARCTION: Status: ACTIVE | Noted: 2023-01-01

## 2023-07-14 PROBLEM — I10 HYPERTENSION: Status: ACTIVE | Noted: 2023-01-01

## 2023-07-14 NOTE — PROGRESS NOTES
" LOS: 3 days   Patient Care Team:  Provider, No Known as PCP - General    Chief Complaint: Postop    Subjective: Intubated/sedated       Vital Signs  Temp:  [95.4 °F (35.2 °C)-98.4 °F (36.9 °C)] 98.4 °F (36.9 °C)  Heart Rate:  [79-89] 89  Resp:  [12-25] 15  BP: ()/(40-70) 136/68  FiO2 (%):  [40 %-70 %] 40 %  Body mass index is 29.1 kg/m².    Intake/Output Summary (Last 24 hours) at 7/14/2023 0912  Last data filed at 7/14/2023 0600  Gross per 24 hour   Intake 9743 ml   Output 3030 ml   Net 6713 ml     No intake/output data recorded.    Chest tube drainage last 8 hours: MS -100/PL -160        07/13/23  0500 07/14/23  0600 07/14/23  0642   Weight: 55.8 kg (123 lb) 67.8 kg (149 lb 7.6 oz) 67.6 kg (149 lb)         Objective:  General Appearance:  Comfortable.    Vital signs: (most recent): Blood pressure 136/68, pulse 89, temperature 98.4 °F (36.9 °C), temperature source Core, resp. rate 15, height 152.4 cm (60\"), weight 67.6 kg (149 lb), SpO2 99 %.  No fever.    Output: Producing urine and no stool output.    HEENT: Normal HEENT exam.    Lungs:  Normal effort and normal respiratory rate.  She is not in respiratory distress.  There are decreased breath sounds.    Heart: Regular rhythm.    Abdomen: Abdomen is soft and non-distended.  Hypoactive bowel sounds.   There is no abdominal tenderness.     Extremities: There is no dependent edema.    Pulses: Distal pulses are intact.    Pupils:  Pupils are equal, round, and reactive to light.    Skin:  Warm and dry.              Results Review:        WBC WBC   Date Value Ref Range Status   07/14/2023 18.40 (H) 3.40 - 10.80 10*3/mm3 Final   07/13/2023 13.90 (H) 3.40 - 10.80 10*3/mm3 Final   07/13/2023 12.80 (H) 3.40 - 10.80 10*3/mm3 Final   07/13/2023 13.30 (H) 3.40 - 10.80 10*3/mm3 Final   07/13/2023 12.40 (H) 3.40 - 10.80 10*3/mm3 Final   07/13/2023 7.30 3.40 - 10.80 10*3/mm3 Final   07/12/2023 8.30 3.40 - 10.80 10*3/mm3 Final   07/12/2023 8.50 3.40 - 10.80 10*3/mm3 Final "      HGB Hemoglobin   Date Value Ref Range Status   07/14/2023 11.7 (L) 12.0 - 15.9 g/dL Final   07/14/2023 11.5 (L) 12.0 - 17.0 g/dL Final   07/13/2023 10.2 (L) 12.0 - 15.9 g/dL Final   07/13/2023 10.1 (L) 12.0 - 17.0 g/dL Final   07/13/2023 10.2 (L) 12.0 - 17.0 g/dL Final   07/13/2023 9.9 (L) 12.0 - 17.0 g/dL Final   07/13/2023 10.2 (L) 12.0 - 15.9 g/dL Final   07/13/2023 10.3 (L) 12.0 - 17.0 g/dL Final   07/13/2023 10.5 (L) 12.0 - 17.0 g/dL Final   07/13/2023 10.1 (L) 12.0 - 17.0 g/dL Final   07/13/2023 10.6 (L) 12.0 - 15.9 g/dL Final     Comment:     Result checked     07/13/2023 8.6 (L) 12.0 - 15.9 g/dL Final   07/13/2023 8.2 (L) 12.0 - 17.0 g/dL Final   07/13/2023 8.8 (L) 12.0 - 17.0 g/dL Final   07/13/2023 9.2 (L) 12.0 - 17.0 g/dL Final   07/13/2023 8.2 (L) 12.0 - 17.0 g/dL Final   07/13/2023 10.2 (L) 12.0 - 17.0 g/dL Final   07/13/2023 12.3 12.0 - 15.9 g/dL Final   07/12/2023 13.2 12.0 - 15.9 g/dL Final   07/12/2023 13.5 12.0 - 15.9 g/dL Final      HCT Hematocrit   Date Value Ref Range Status   07/14/2023 36.1 34.0 - 46.6 % Final   07/14/2023 34 (L) 38 - 51 % Final   07/13/2023 31.9 (L) 34.0 - 46.6 % Final   07/13/2023 30 (L) 38 - 51 % Final   07/13/2023 30 (L) 38 - 51 % Final   07/13/2023 29 (L) 38 - 51 % Final   07/13/2023 31.2 (L) 34.0 - 46.6 % Final   07/13/2023 30 (L) 38 - 51 % Final   07/13/2023 31 (L) 38 - 51 % Final   07/13/2023 30 (L) 38 - 51 % Final   07/13/2023 32.1 (L) 34.0 - 46.6 % Final   07/13/2023 27.1 (L) 34.0 - 46.6 % Final   07/13/2023 24 (L) 38 - 51 % Final   07/13/2023 26 (L) 38 - 51 % Final   07/13/2023 27 (L) 38 - 51 % Final   07/13/2023 24 (L) 38 - 51 % Final   07/13/2023 30 (L) 38 - 51 % Final   07/13/2023 37.8 34.0 - 46.6 % Final   07/12/2023 39.7 34.0 - 46.6 % Final   07/12/2023 40.9 34.0 - 46.6 % Final      Platelets Platelets   Date Value Ref Range Status   07/14/2023 177 140 - 450 10*3/mm3 Final   07/13/2023 148 140 - 450 10*3/mm3 Final   07/13/2023 138 (L) 140 - 450 10*3/mm3  Final   07/13/2023 138 (L) 140 - 450 10*3/mm3 Final   07/13/2023 67 (L) 140 - 450 10*3/mm3 Final   07/13/2023 187 140 - 450 10*3/mm3 Final   07/12/2023 195 140 - 450 10*3/mm3 Final   07/12/2023 187 140 - 450 10*3/mm3 Final        PT/INR:    Protime   Date Value Ref Range Status   07/14/2023 12.6 (H) 9.6 - 11.7 Seconds Final   07/13/2023 13.0 (H) 9.6 - 11.7 Seconds Final   07/13/2023 14.7 (H) 9.6 - 11.7 Seconds Final   07/12/2023 10.7 9.6 - 11.7 Seconds Final   /  INR   Date Value Ref Range Status   07/14/2023 1.19 (H) 0.93 - 1.10 Final   07/13/2023 1.23 (H) 0.93 - 1.10 Final   07/13/2023 1.40 (H) 0.93 - 1.10 Final   07/12/2023 1.00 0.93 - 1.10 Final       Sodium Sodium   Date Value Ref Range Status   07/14/2023 148 (H) 136 - 145 mmol/L Final   07/13/2023 148 (H) 136 - 145 mmol/L Final   07/13/2023 148 (H) 136 - 145 mmol/L Final   07/13/2023 148 (H) 136 - 145 mmol/L Final   07/13/2023 144 136 - 145 mmol/L Final   07/13/2023 140 136 - 145 mmol/L Final   07/12/2023 137 136 - 145 mmol/L Final      Potassium Potassium   Date Value Ref Range Status   07/14/2023 4.0 3.5 - 5.2 mmol/L Final     Comment:     Slight hemolysis detected by analyzer. Results may be affected.   07/13/2023 3.2 (L) 3.5 - 5.2 mmol/L Final   07/13/2023 3.9 3.5 - 5.2 mmol/L Final   07/13/2023 4.4 3.5 - 5.2 mmol/L Final   07/13/2023 3.2 (L) 3.5 - 5.2 mmol/L Final   07/13/2023 4.0 3.5 - 5.2 mmol/L Final   07/12/2023 3.6 3.5 - 5.2 mmol/L Final      Chloride Chloride   Date Value Ref Range Status   07/14/2023 117 (H) 98 - 107 mmol/L Final   07/13/2023 115 (H) 98 - 107 mmol/L Final   07/13/2023 115 (H) 98 - 107 mmol/L Final   07/13/2023 116 (H) 98 - 107 mmol/L Final   07/13/2023 106 98 - 107 mmol/L Final   07/12/2023 103 98 - 107 mmol/L Final      Bicarbonate CO2   Date Value Ref Range Status   07/14/2023 17.0 (L) 22.0 - 29.0 mmol/L Final   07/13/2023 17.0 (L) 22.0 - 29.0 mmol/L Final   07/13/2023 19.0 (L) 22.0 - 29.0 mmol/L Final   07/13/2023 17.0 (L) 22.0  - 29.0 mmol/L Final   07/13/2023 25.0 22.0 - 29.0 mmol/L Final   07/12/2023 23.0 22.0 - 29.0 mmol/L Final      BUN BUN   Date Value Ref Range Status   07/14/2023 18 8 - 23 mg/dL Final   07/13/2023 19 8 - 23 mg/dL Final   07/13/2023 17 8 - 23 mg/dL Final   07/13/2023 17 8 - 23 mg/dL Final   07/13/2023 19 8 - 23 mg/dL Final   07/12/2023 15 8 - 23 mg/dL Final      Creatinine Creatinine   Date Value Ref Range Status   07/14/2023 0.91 0.57 - 1.00 mg/dL Final   07/13/2023 0.73 0.57 - 1.00 mg/dL Final   07/13/2023 0.71 0.57 - 1.00 mg/dL Final   07/13/2023 0.76 0.57 - 1.00 mg/dL Final   07/13/2023 0.79 0.57 - 1.00 mg/dL Final   07/12/2023 0.69 0.57 - 1.00 mg/dL Final      Calcium Calcium   Date Value Ref Range Status   07/14/2023 8.5 (L) 8.6 - 10.5 mg/dL Final   07/13/2023 8.8 8.6 - 10.5 mg/dL Final   07/13/2023 9.1 8.6 - 10.5 mg/dL Final   07/13/2023 8.8 8.6 - 10.5 mg/dL Final   07/13/2023 8.8 8.6 - 10.5 mg/dL Final   07/13/2023 8.3 (L) 8.6 - 10.5 mg/dL Final   07/12/2023 8.7 8.6 - 10.5 mg/dL Final      Magnesium Magnesium   Date Value Ref Range Status   07/14/2023 2.3 1.6 - 2.4 mg/dL Final   07/13/2023 2.2 1.6 - 2.4 mg/dL Final   07/13/2023 3.0 (H) 1.6 - 2.4 mg/dL Final   07/13/2023 1.4 (L) 1.6 - 2.4 mg/dL Final   07/12/2023 1.8 1.6 - 2.4 mg/dL Final   07/12/2023 2.0 1.6 - 2.4 mg/dL Final   07/11/2023 1.9 1.6 - 2.4 mg/dL Final          aspirin, 81 mg, Oral, Daily  atorvastatin, 40 mg, Oral, Nightly  calcium gluconate, 1,000 mg, Intravenous, Once  ceFAZolin, 2 g, Intravenous, Q8H  chlorhexidine, 15 mL, Mouth/Throat, Q12H  enoxaparin, 40 mg, Subcutaneous, Daily  magnesium sulfate, 1 g, Intravenous, Q8H  metoprolol tartrate, 12.5 mg, Oral, Q12H  mupirocin, , Each Nare, BID  pantoprazole, 40 mg, Oral, Q AM  senna-docusate sodium, 2 tablet, Oral, Nightly  sodium bicarbonate, 50 mEq, Intravenous, Once      dexmedetomidine, 0.2-1.5 mcg/kg/hr, Last Rate: 0.2 mcg/kg/hr (07/14/23 0859)  EPINEPHrine, 0.02-0.3 mcg/kg/min, Last Rate:  0.06 mcg/kg/min (07/14/23 0721)  insulin, 0-100 Units/hr, Last Rate: 2.3 Units/hr (07/14/23 0816)  milrinone, 0.25 mcg/kg/min, Last Rate: 0.375 mcg/kg/min (07/14/23 0721)  niCARdipine, 5-15 mg/hr  nitroglycerin, 10-50 mcg/min, Last Rate: Stopped (07/13/23 1309)  norepinephrine, 0.02-0.3 mcg/kg/min, Last Rate: Stopped (07/14/23 0816)  sodium chloride, 125 mL/hr, Last Rate: 125 mL/hr (07/14/23 0701)  vasopressin, 0.04 Units/min, Last Rate: 0.03 Units/min (07/14/23 0845)        Assessment & Plan    -Severe 3 vessel CAD with 50% LM disease, s/p CABG x 3 off pump (LIMA to LAD, SVG to OM, SVG to PDA), EVH of the right leg--POD#1, Dr. Galindo  -NSTEMI  -Mild to moderate MR  -Severely depressed LV systolic function---EF 30-35%  -HTN  -Remote hx of tobacco abuse---quit 50 years ago   -Post op anemia, expected ABL  -Leukocytosis, reactive     Intubated/sedated on vent--d/w nursing. Titrate dex in anticipation of weaning trial--intensivist consulted. Assess neuro status when sedation weaned off.   NSR in 80's underlying pacer--pacing AAI at 90 bpm for hemodynamic support.  Echo this am reviewed by Dr. Galindo. Epi/milrinone gtts up titrated with improvement in CI from 1.4 to 1.7. Continue inotropic support with goal CI of 2.0. Wean vaso to keep MAP > 65.  CXR with trace left pleural effusion. Received IV lasix x1 last night--UOP almost -2L yesterday; replete electrolytes. CVP is 10. Hold off on additional diuresis. ABG reviewed--replete bicarb. Repeat ABG/VBG in one hour.   Nephrology consulted--on 1/2 NS at 125 ml/hr given hypernatremia in shock state  Do not deescalate--lines/tubes to remain  Mobilize when able after extubation--PT/OT consulted  Continue routine care    Barbara Flores, APRN  07/14/23  09:12 EDT

## 2023-07-14 NOTE — PROGRESS NOTES
"Enter Query Response Below      Query Response: cardiogenic shock             If applicable, please update the problem list.   Patient: Dary Fabian        : 1935  Account: [id number]           Admit Date:         How to Respond to this query:       a. Click New Note     b. Answer query within the yellow box.                c. Update the Problem List, if applicable.      If you have any questions about this query contact me at: [phone].     Barbara,     Patient admitted on  with NSTEMI. S/p Cabg x 3 off pump .  POD #1 Patient continues on IV Epinephrine, IV Vasopressin, IV Levophed and IV Primacor.   Per CTS  progress note \"Epi/milrinone gtts up titrated with improvement in CI from 1.4 to 1.7.\"  \"on 1 NS at 125 ml/hr given hypernatremia in shock state\".    After study, can the patient's shock be further specified as:    - Cardiogenic shock.  - Shock of other etiology_________(please specify).  - Other__________.  - Unable to determine.      By submitting this query, we are merely seeking further clarification of documentation to accurately reflect all conditions that you are monitoring, evaluating, treating or that extend the hospitalization or utilize additional resources of care. Please utilize your independent clinical judgment when addressing the question(s) above.     This query and your response, once completed, will be entered into the legal medical record.    Sincerely,  Mami Bojorquez RN  Clinical Documentation Integrity Program     "

## 2023-07-14 NOTE — THERAPY EVALUATION
Patient Name: Dary Fabian  : 1935    MRN: [id number]                              Today's Date: 2023       Admit Date: 2023    Visit Dx:     ICD-10-CM ICD-9-CM   1. NSTEMI (non-ST elevated myocardial infarction)  I21.4 410.70   2. Unstable angina  I20.0 411.1   3. Status post angioplasty with stent  Z95.820 V45.89   4. Coronary artery disease involving native coronary artery of native heart with unstable angina pectoris  I25.110 414.01     411.1     Patient Active Problem List   Diagnosis    Myocardial infarction    Unstable angina    Status post angioplasty with stent    Coronary artery disease involving native coronary artery of native heart with unstable angina pectoris    Hyperlipidemia    Hypertension    S/P cardiac cath     Past Medical History:   Diagnosis Date    Coronary artery disease     Elevated cholesterol     Hypertension      Past Surgical History:   Procedure Laterality Date    CARDIAC CATHETERIZATION N/A 2023    Procedure: Left Heart Cath and coronary angiogram;  Surgeon: Yoana Vizcarra MD;  Location: [facility] CATH INVASIVE LOCATION;  Service: Cardiovascular;  Laterality: N/A;    HYSTERECTOMY        General Information       Row Name 23 1501          Physical Therapy Time and Intention    Document Type re-evaluation  -CL     Mode of Treatment physical therapy  -CL       Row Name 23 1501          General Information    Patient Profile Reviewed yes  -CL     Existing Precautions/Restrictions cardiac;fall;oxygen therapy device and L/min;sternal  -CL     Barriers to Rehab medically complex  -CL       Row Name 23 1501          Living Environment    People in Home child(tevin), adult  -CL     Name(s) of People in Home Daughter: Samanta  -CL       Row Name 23 1501          Home Main Entrance    Number of Stairs, Main Entrance two  -CL     Stair Railings, Main Entrance railings safe and in good condition  -CL       Row Name 23 1501           Cognition    Orientation Status (Cognition) oriented x 4  -CL       Row Name 07/14/23 1501          Safety Issues, Functional Mobility    Impairments Affecting Function (Mobility) balance;endurance/activity tolerance;pain  -CL               User Key  (r) = Recorded By, (t) = Taken By, (c) = Cosigned By      Initials Name Provider Type    Melody Neff, PT Physical Therapist                   Mobility       Row Name 07/14/23 1502          Bed Mobility    Bed Mobility bed mobility (all) activities;supine-sit;sit-supine  -CL     Supine-Sit [place] (Bed Mobility) dependent (less than 25% patient effort);2 person assist  -CL     Sit-Supine [place] (Bed Mobility) dependent (less than 25% patient effort);2 person assist  -CL     Assistive Device (Bed Mobility) head of bed elevated;draw sheet  -CL       Row Name 07/14/23 1502          Transfers    Comment, (Transfers) Pt not appropriate for transfers:[place] in place, fatigues after sitting x 4.5 minutes  -CL               User Key  (r) = Recorded By, (t) = Taken By, (c) = Cosigned By      Initials Name Provider Type    Melody Neff PT Physical Therapist                   Obj/Interventions       Row Name 07/14/23 1504          Range of Motion Comprehensive    General Range of Motion neck/trunk range of motion deficits identified  -CL       Row Name 07/14/23 1504          Strength Comprehensive (MMT)    Comment, General Manual Muscle Testing (MMT) Assessment LEs not tested  -CL       Row Name 07/14/23 1504          Balance    Balance Assessment sitting static balance  -CL     Static Sitting Balance maximum assist  -CL     Comment, Balance Pt briefly able to hold in sitting but requires min-mod A for bulk of sitting time this session  -CL               User Key  (r) = Recorded By, (t) = Taken By, (c) = Cosigned By      Initials Name Provider Type    Melody Neff, PT Physical Therapist                   Goals/Plan       Row Name 07/14/23 1523           Bed Mobility Goal 1 (PT)    Activity/Assistive Device (Bed Mobility Goal 1, PT) bed mobility activities, all  -CL     [place] Level/Cues Needed (Bed Mobility Goal 1, PT) minimum assist (75% or more patient effort)  -CL     Time Frame (Bed Mobility Goal 1, PT) long term goal (LTG);2 weeks  -CL     Progress/Outcomes (Bed Mobility Goal 1, PT) goal revised this date  -CL       Row Name 07/14/23 1524          Transfer Goal 1 (PT)    Activity/Assistive Device (Transfer Goal 1, PT) sit-to-stand/stand-to-sit;bed-to-chair/chair-to-bed  -CL     [place] Level/Cues Needed (Transfer Goal 1, PT) minimum assist (75% or more patient effort)  -CL     Time Frame (Transfer Goal 1, PT) long term goal (LTG);2 weeks  -CL     Progress/Outcome (Transfer Goal 1, PT) goal revised this date  -CL       Row Name 07/14/23 1524          Gait Training Goal 1 (PT)    Activity/Assistive Device (Gait Training Goal 1, PT) gait (walking locomotion);walker, rolling  -CL     [place] Level (Gait Training Goal 1, PT) standby assist  -CL     Distance (Gait Training Goal 1, PT) 200 feet  -CL     Time Frame (Gait Training Goal 1, PT) long term goal (LTG);2 weeks  -CL     Progress/Outcome (Gait Training Goal 1, PT) goal ongoing  -CL       Row Name 07/14/23 1524          Stairs Goal 1 (PT)    Activity/Assistive Device (Stairs Goal 1, PT) ascending stairs;descending stairs  -CL     [place] Level/Cues Needed (Stairs Goal 1, PT) contact guard required  -CL     Number of Stairs (Stairs Goal 1, PT) 2  -CL     Time Frame (Stairs Goal 1, PT) long term goal (LTG);2 weeks  -CL     Progress/Outcome (Stairs Goal 1, PT) goal ongoing  -CL       Row Name 07/14/23 1524          Therapy Assessment/Plan (PT)    Planned Therapy Interventions (PT) balance training;bed mobility training;gait training;home exercise program;patient/family education;stair training;strengthening;ROM (range of motion);transfer training  -CL               User Key  (r) =  Recorded By, (t) = Taken By, (c) = Cosigned By      Initials Name Provider Type    CL Melody Lozano, PT Physical Therapist                   Clinical Impression       Row Name 07/14/23 1500          Pain    Additional Documentation Pain Scale: FACES Pre/Post-Treatment (Group)  -       Row Name 07/14/23 1504          Pain Scale: FACES Pre/Post-Treatment    Pain: FACES Scale, Pretreatment 8-->hurts whole lot  -CL     Posttreatment Pain Rating 8-->hurts whole lot  -CL       Row Name 07/14/23 1503          Plan of Care Review    Plan of Care Reviewed With patient  -CL     Outcome Evaluation Dary Fabian is an 87 y.o. female with multivessel CAD and NSTEMI.  She was initially evaluated by PT on 7/12 and is now reassessed following CABG x 3 with vein harvest from RLE on 7/13 by Dr. Galindo.  At time of reassessment pt is A&Ox 4 but has some difficulty understanding sternal precautions.  Pt requires dependent assist of 2 to transfer to sit EOB.  PT able to tolerate EOB x 4-1/2 minutes with mod A for balance.  Pt is able to hold her balance briefly with CGA but is limited by fatigue.  PT will continue to see 5 days/week and progress as able. Pt may require SNF at discharge for continued therapy.  -       Row Name 07/14/23 1504          Therapy Assessment/Plan (PT)    Rehab Potential (PT) good, to achieve stated therapy goals  -CL     Criteria for Skilled Interventions Met (PT) yes;skilled treatment is necessary  -CL     Therapy Frequency (PT) 5 times/wk  -CL     Predicted Duration of Therapy Intervention (PT) Until discharge  -CL       Row Name 07/14/23 1506          Vital Signs    Pre Systolic BP Rehab 114  -CL     Pre Treatment Diastolic BP 62  -CL     Post Systolic BP Rehab 111  -CL     Post Treatment Diastolic BP 51  -CL     Pretreatment Heart Rate (beats/min) 90  -CL     Posttreatment Heart Rate (beats/min) 87  -CL     Pre SpO2 (%) 97  -CL     O2 Delivery Pre Treatment supplemental O2  -CL     O2  Delivery Intra Treatment supplemental O2  -CL     O2 Delivery Post Treatment supplemental O2  -CL     Pre Patient Position Supine  -CL     Intra Patient Position Sitting  -CL     Post Patient Position Supine  -CL       Row Name 07/14/23 1505          Positioning and Restraints    Pre-Treatment Position in bed  -CL     Post Treatment Position bed  -CL     In Bed notified nsg;supine;call light within reach;with nsg  -CL               User Key  (r) = Recorded By, (t) = Taken By, (c) = Cosigned By      Initials Name Provider Type    Melody Neff, PT Physical Therapist                   Outcome Measures       Row Name 07/14/23 1525          How much help from another person do you currently need...    Turning from your back to your side while in flat bed without using bedrails? 1  -CL     Moving from lying on back to sitting on the side of a flat bed without bedrails? 1  -CL     Moving to and from a bed to a chair (including a wheelchair)? 1  -CL     Standing up from a chair using your arms (e.g., wheelchair, bedside chair)? 1  -CL     Climbing 3-5 steps with a railing? 1  -CL     To walk in hospital room? 1  -CL     AM-PAC 6 Clicks Score (PT) 6  -CL     Highest level of mobility 2 --> Bed activities/dependent transfer  -CL       Row Name 07/14/23 1514          Functional Assessment    Outcome Measure Options AM-PAC 6 Clicks Daily Activity (OT)  -MS               User Key  (r) = Recorded By, (t) = Taken By, (c) = Cosigned By      Initials Name Provider Type    MS [place] Ashli, OT Occupational Therapist    Melody Neff, PT Physical Therapist                                 Physical Therapy Education       Title: PT OT SLP Therapies (In Progress)       Topic: Physical Therapy (In Progress)       Point: Mobility training (In Progress)       Learning Progress Summary             Patient Acceptance, E,TB, NR by CL at 7/14/2023 1525    Acceptance, E, VU by EJ at 7/12/2023 1416                         Point:  Home exercise program (Not Started)       Learner Progress:  Not documented in this visit.              Point: Body mechanics (In Progress)       Learning Progress Summary             Patient Acceptance, E,TB, NR by CL at 7/14/2023 1525    Acceptance, E, VU by EJ at 7/12/2023 1416                         Point: Precautions (In Progress)       Learning Progress Summary             Patient Acceptance, E,TB, NR by CL at 7/14/2023 1525    Acceptance, E, VU by EJ at 7/12/2023 1416                                         User Key       Initials Effective Dates Name Provider Type Discipline     07/11/23 -  Alysas Logan, PT Physical Therapist PT    CL 03/02/22 -  Melody Lozano, PT Physical Therapist PT                  PT Recommendation and Plan  Planned Therapy Interventions (PT): balance training, bed mobility training, gait training, home exercise program, patient/family education, stair training, strengthening, ROM (range of motion), transfer training  Plan of Care Reviewed With: patient  Outcome Evaluation: Dary Fabian is an 87 y.o. female with multivessel CAD and NSTEMI.  She was initially evaluated by PT on 7/12 and is now reassessed following CABG x 3 with vein harvest from RLE on 7/13 by Dr. Galindo.  At time of reassessment pt is A&Ox 4 but has some difficulty understanding sternal precautions.  Pt requires dependent assist of 2 to transfer to sit EOB.  PT able to tolerate EOB x 4-1/2 minutes with mod A for balance.  Pt is able to hold her balance briefly with CGA but is limited by fatigue.  PT will continue to see 5 days/week and progress as able. Pt may require SNF at discharge for continued therapy.     Time Calculation:    PT Charges       Row Name 07/14/23 1526             Time Calculation    Start Time 1355  -CL      Stop Time 1420  -CL      Time Calculation (min) 25 min  -CL      PT Received On 07/14/23  -CL      PT - Next Appointment 07/15/23  -CL      PT Goal Re-Cert Due Date 07/28/23   -CL         Time Calculation- PT    Total Timed Code Minutes- PT 8 minute(s)  -CL                User Key  (r) = Recorded By, (t) = Taken By, (c) = Cosigned By      Initials Name Provider Type    Melody Neff, PT Physical Therapist                  Therapy Charges for Today       Code Description Service Date Service Provider Modifiers Qty    [id number]  PT RE-EVAL ESTABLISHED PLAN 2 7/14/2023 Melody Lozano, PT GP 1    [id number]  PT THERAPEUTIC ACT EA 15 MIN 7/14/2023 Melody Lozano, PT GP 1            PT G-Codes  Outcome Measure Options: AM-PAC 6 Clicks Daily Activity (OT)  AM-PAC 6 Clicks Score (PT): 6  AM-PAC 6 Clicks Score (OT): 8  PT Discharge Summary  Anticipated Discharge Disposition (PT): skilled nursing facility    Melody Lozano PT  7/14/2023

## 2023-07-14 NOTE — PLAN OF CARE
Goal Outcome Evaluation:     Problem: Adult Inpatient Plan of Care  Goal: Plan of Care Review  Outcome: Ongoing, Progressing  Goal: Patient-Specific Goal (Individualized)  Outcome: Ongoing, Progressing  Goal: Absence of Hospital-Acquired Illness or Injury  Outcome: Ongoing, Progressing  Intervention: Identify and Manage Fall Risk  Recent Flowsheet Documentation  Taken 7/14/2023 0400 by Jenifer Oquendo RNA  Safety Promotion/Fall Prevention:   safety round/check completed   fall prevention program maintained  Taken 7/14/2023 0300 by Jenifer Oquendo RNA  Safety Promotion/Fall Prevention:   safety round/check completed   fall prevention program maintained  Taken 7/14/2023 0200 by Jenifer Oquendo RNA  Safety Promotion/Fall Prevention:   safety round/check completed   fall prevention program maintained  Taken 7/14/2023 0100 by Jenifer Oquendo RNA  Safety Promotion/Fall Prevention:   safety round/check completed   fall prevention program maintained  Taken 7/14/2023 0000 by Jenifer Oquendo RNA  Safety Promotion/Fall Prevention:   safety round/check completed   fall prevention program maintained  Taken 7/13/2023 2300 by Jenifer Oquendo RNA  Safety Promotion/Fall Prevention: safety round/check completed  Intervention: Prevent Skin Injury  Recent Flowsheet Documentation  Taken 7/14/2023 0400 by Jenifer Oquendo RNA  Body Position:   turned   left   supine  Taken 7/14/2023 0200 by Jenifer Oquendo RNA  Body Position: weight shifting  Taken 7/14/2023 0000 by Jenifer Oquendo RNA  Body Position: weight shifting  Intervention: Prevent and Manage VTE (Venous Thromboembolism) Risk  Recent Flowsheet Documentation  Taken 7/14/2023 0400 by Jenifer Oquendo RNA  VTE Prevention/Management: sequential compression devices on  Taken 7/14/2023 0000 by Jenifer Oquendo RNA  VTE Prevention/Management: sequential compression devices on  Intervention: Prevent Infection  Recent Flowsheet  Documentation  Taken 7/14/2023 0400 by Jenifer Oquendo RNA  Infection Prevention:   single patient room provided   rest/sleep promoted   personal protective equipment utilized   hand hygiene promoted  Goal: Optimal Comfort and Wellbeing  Outcome: Ongoing, Progressing  Goal: Readiness for Transition of Care  Outcome: Ongoing, Progressing     Problem: Fall Injury Risk  Goal: Absence of Fall and Fall-Related Injury  Outcome: Ongoing, Progressing  Intervention: Identify and Manage Contributors  Recent Flowsheet Documentation  Taken 7/14/2023 0400 by Jenifer Oquendo RNA  Medication Review/Management:   medications reviewed   infusion titrated  Intervention: Promote Injury-Free Environment  Recent Flowsheet Documentation  Taken 7/14/2023 0400 by Jenifer Oquendo RNA  Safety Promotion/Fall Prevention:   safety round/check completed   fall prevention program maintained  Taken 7/14/2023 0300 by Jenifer Oquendo RNA  Safety Promotion/Fall Prevention:   safety round/check completed   fall prevention program maintained  Taken 7/14/2023 0200 by Jenifer Oquendo RNA  Safety Promotion/Fall Prevention:   safety round/check completed   fall prevention program maintained  Taken 7/14/2023 0100 by Jenifer Oquendo RNA  Safety Promotion/Fall Prevention:   safety round/check completed   fall prevention program maintained  Taken 7/14/2023 0000 by Jenifer Oquendo RNA  Safety Promotion/Fall Prevention:   safety round/check completed   fall prevention program maintained  Taken 7/13/2023 2300 by Jenifer Oquendo RNA  Safety Promotion/Fall Prevention: safety round/check completed     Problem: Skin Injury Risk Increased  Goal: Skin Health and Integrity  Outcome: Ongoing, Progressing  Intervention: Optimize Skin Protection  Recent Flowsheet Documentation  Taken 7/14/2023 0400 by Jenifer Oquendo RNA  Head of Bed (HOB) Positioning: HOB at 30-45 degrees  Taken 7/14/2023 0200 by Jenifer Oquendo RNA  Head  of Bed (HOB) Positioning: HOB at 30-45 degrees  Taken 7/14/2023 0000 by Jenifer Oquendo RNA  Pressure Reduction Devices: positioning supports utilized            Pt had low urine output and CI throughout the night. Pt has received a total of 6 albumins since surgery and is currently on Levo, Vaso, Epi, and primacore. Dr. Galindo ordered to keep pt intubated overnight. 20 of Lasix was given with improvement of urine output however Cardiac Index dropped to 1.4. Dr. DOVER was made aware and ordered an echo for this morning.

## 2023-07-14 NOTE — CONSULTS
NEPHROLOGY CONSULTATION-----[facility]    [facility]  [phone]  Laci Gutierrez MD    Patient Care Team:  Provider, No Known as PCP - Laci Sharp MD as Consulting Physician (Nephrology)    CC/REASON FOR CONSULTATION: Hypernatremia    PHYSICIAN REQUESTING CONSULTATION: Dr Galindo    History of Present Illness  87 female with past medical history of coronary disease, hypertension presented to hospital on 7/11/2023 with complaint of chest pain.  She underwent cardiac cath.  Now has undergone CABG.  Her sodium is 148.  Her creatinine is 0.9.  No vomiting or diarrhea.  She just got extubated earlier.  No dysuria gross hematuria.  She was diuresed to -2 L yesterday.    Review of Systems   As noted above otherwise systems reviewed and were negative.    Past Medical History:   Diagnosis Date    Coronary artery disease     Elevated cholesterol     Hypertension        Past Surgical History:   Procedure Laterality Date    CARDIAC CATHETERIZATION N/A 07/11/2023    Procedure: Left Heart Cath and coronary angiogram;  Surgeon: Yoana Vizcarra MD;  Location: [facility] CATH INVASIVE LOCATION;  Service: Cardiovascular;  Laterality: N/A;    HYSTERECTOMY         History reviewed. No pertinent family history.    Social History     Tobacco Use    Smoking status: Never    Smokeless tobacco: Never   Vaping Use    Vaping Use: Never used   Substance Use Topics    Alcohol use: Never    Drug use: Never       Home Meds:   Medications Prior to Admission   Medication Sig Dispense Refill Last Dose    acetaminophen (TYLENOL) 325 MG tablet Take 2 tablets by mouth Every 6 (Six) Hours As Needed for Mild Pain.          Scheduled Meds:  albumin human, 250 mL, Intravenous, Once  aspirin, 81 mg, Oral, Daily  atorvastatin, 40 mg, Oral, Nightly  ceFAZolin, 2 g, Intravenous, Q8H  chlorhexidine, 15 mL, Mouth/Throat, Q12H  enoxaparin, 40 mg, Subcutaneous, Daily  magnesium  sulfate, 1 g, Intravenous, Q8H  mupirocin, , Each Nare, BID  pantoprazole, 40 mg, Oral, Q AM  senna-docusate sodium, 2 tablet, Oral, Nightly        Continuous Infusions:  EPINEPHrine, 0.02-0.3 mcg/kg/min, Last Rate: 0.07 mcg/kg/min (07/14/23 1248)  insulin, 0-100 Units/hr, Last Rate: 2 Units/hr (07/14/23 1226)  milrinone, 0.25 mcg/kg/min, Last Rate: 0.375 mcg/kg/min (07/14/23 0951)  norepinephrine, 0.02-0.3 mcg/kg/min, Last Rate: 0.02 mcg/kg/min (07/14/23 1226)  sodium chloride, 75 mL/hr, Last Rate: 125 mL/hr (07/14/23 0701)  vasopressin, 0.04 Units/min, Last Rate: 0.03 Units/min (07/14/23 0845)        PRN Meds:    acetaminophen **OR** acetaminophen **OR** acetaminophen    bisacodyl    bisacodyl    Calcium Replacement - Follow Nurse / BPA Driven Protocol    dextrose    dextrose    glucagon (human recombinant)    ipratropium-albuterol    Magnesium Cardiology Dose Replacement - Follow Nurse / BPA Driven Protocol    magnesium hydroxide    nitroglycerin    norepinephrine    ondansetron    Phosphorus Replacement - Follow Nurse / BPA Driven Protocol    polyethylene glycol    Potassium Replacement - Follow Nurse / BPA Driven Protocol    Allergies:  Patient has no known allergies.    OBJECTIVE    Vital Signs  Temp:  [96.3 °F (35.7 °C)-99 °F (37.2 °C)] 98.8 °F (37.1 °C)  Heart Rate:  [88-89] 89  Resp:  [12-25] 21  BP: ()/(40-70) 97/49  FiO2 (%):  [40 %] 40 %    I/O this shift:  In: 250 [IV Piggyback:250]  Out: 398 [Urine:188; Chest Tube:210]  I/O last 3 completed shifts:  In: 50247 [P.O.:240; I.V.:8431; Blood:1749; IV Piggyback:2750]  Out: 3670 [Urine:2485; Blood:500; Chest Tube:685]    Physical Exam:  General Appearance: alert, appears stated age and cooperative  Head: normocephalic, without obvious abnormality and atraumatic  Eyes: conjunctivae and sclerae normal and no icterus  Neck: supple and no JVD  Lungs: Diminished breath sounds   heart: regular rhythm & normal rate and normal S1, S2  Chest Wall: no  abnormalities observed  Abdomen: normal bowel sounds and soft, nontender  Extremities: moves extremities well, no edema, no cyanosis  Skin: no bleeding, bruising or rash  Neurologic: Alert, and oriented. No focal deficits    Results Review:    I reviewed the patient's new clinical results.    WBC WBC   Date Value Ref Range Status   07/14/2023 21.70 (H) 3.40 - 10.80 10*3/mm3 Final   07/14/2023 20.80 (H) 3.40 - 10.80 10*3/mm3 Final   07/14/2023 18.40 (H) 3.40 - 10.80 10*3/mm3 Final   07/13/2023 13.90 (H) 3.40 - 10.80 10*3/mm3 Final   07/13/2023 12.80 (H) 3.40 - 10.80 10*3/mm3 Final   07/13/2023 13.30 (H) 3.40 - 10.80 10*3/mm3 Final   07/13/2023 12.40 (H) 3.40 - 10.80 10*3/mm3 Final   07/13/2023 7.30 3.40 - 10.80 10*3/mm3 Final   07/12/2023 8.30 3.40 - 10.80 10*3/mm3 Final   07/12/2023 8.50 3.40 - 10.80 10*3/mm3 Final      HGB Hemoglobin   Date Value Ref Range Status   07/14/2023 10.8 (L) 12.0 - 15.9 g/dL Final   07/14/2023 11.2 (L) 12.0 - 15.9 g/dL Final   07/14/2023 11.7 (L) 12.0 - 15.9 g/dL Final   07/14/2023 11.5 (L) 12.0 - 17.0 g/dL Final   07/13/2023 10.2 (L) 12.0 - 15.9 g/dL Final   07/13/2023 10.1 (L) 12.0 - 17.0 g/dL Final   07/13/2023 10.2 (L) 12.0 - 17.0 g/dL Final   07/13/2023 9.9 (L) 12.0 - 17.0 g/dL Final   07/13/2023 10.2 (L) 12.0 - 15.9 g/dL Final   07/13/2023 10.3 (L) 12.0 - 17.0 g/dL Final   07/13/2023 10.5 (L) 12.0 - 17.0 g/dL Final   07/13/2023 10.1 (L) 12.0 - 17.0 g/dL Final   07/13/2023 10.6 (L) 12.0 - 15.9 g/dL Final     Comment:     Result checked     07/13/2023 8.6 (L) 12.0 - 15.9 g/dL Final   07/13/2023 8.2 (L) 12.0 - 17.0 g/dL Final   07/13/2023 8.8 (L) 12.0 - 17.0 g/dL Final   07/13/2023 9.2 (L) 12.0 - 17.0 g/dL Final   07/13/2023 8.2 (L) 12.0 - 17.0 g/dL Final   07/13/2023 10.2 (L) 12.0 - 17.0 g/dL Final   07/13/2023 12.3 12.0 - 15.9 g/dL Final   07/12/2023 13.2 12.0 - 15.9 g/dL Final   07/12/2023 13.5 12.0 - 15.9 g/dL Final      HCT Hematocrit   Date Value Ref Range Status   07/14/2023  32.7 (L) 34.0 - 46.6 % Final   07/14/2023 34.7 34.0 - 46.6 % Final   07/14/2023 36.1 34.0 - 46.6 % Final   07/14/2023 34 (L) 38 - 51 % Final   07/13/2023 31.9 (L) 34.0 - 46.6 % Final   07/13/2023 30 (L) 38 - 51 % Final   07/13/2023 30 (L) 38 - 51 % Final   07/13/2023 29 (L) 38 - 51 % Final   07/13/2023 31.2 (L) 34.0 - 46.6 % Final   07/13/2023 30 (L) 38 - 51 % Final   07/13/2023 31 (L) 38 - 51 % Final   07/13/2023 30 (L) 38 - 51 % Final   07/13/2023 32.1 (L) 34.0 - 46.6 % Final   07/13/2023 27.1 (L) 34.0 - 46.6 % Final   07/13/2023 24 (L) 38 - 51 % Final   07/13/2023 26 (L) 38 - 51 % Final   07/13/2023 27 (L) 38 - 51 % Final   07/13/2023 24 (L) 38 - 51 % Final   07/13/2023 30 (L) 38 - 51 % Final   07/13/2023 37.8 34.0 - 46.6 % Final   07/12/2023 39.7 34.0 - 46.6 % Final   07/12/2023 40.9 34.0 - 46.6 % Final      Platelets No results found for: LABPLAT   MCV MCV   Date Value Ref Range Status   07/14/2023 88.5 79.0 - 97.0 fL Final   07/14/2023 89.1 79.0 - 97.0 fL Final   07/14/2023 90.2 79.0 - 97.0 fL Final   07/13/2023 88.5 79.0 - 97.0 fL Final   07/13/2023 90.1 79.0 - 97.0 fL Final   07/13/2023 88.5 79.0 - 97.0 fL Final   07/13/2023 90.3 79.0 - 97.0 fL Final   07/13/2023 91.9 79.0 - 97.0 fL Final   07/12/2023 90.7 79.0 - 97.0 fL Final   07/12/2023 91.2 79.0 - 97.0 fL Final          Sodium Sodium   Date Value Ref Range Status   07/14/2023 147 (H) 136 - 145 mmol/L Final   07/14/2023 146 (H) 136 - 145 mmol/L Final   07/14/2023 148 (H) 136 - 145 mmol/L Final   07/13/2023 148 (H) 136 - 145 mmol/L Final   07/13/2023 148 (H) 136 - 145 mmol/L Final   07/13/2023 148 (H) 136 - 145 mmol/L Final   07/13/2023 144 136 - 145 mmol/L Final   07/13/2023 140 136 - 145 mmol/L Final   07/12/2023 137 136 - 145 mmol/L Final      Potassium Potassium   Date Value Ref Range Status   07/14/2023 4.2 3.5 - 5.2 mmol/L Final   07/14/2023 3.9 3.5 - 5.2 mmol/L Final   07/14/2023 4.0 3.5 - 5.2 mmol/L Final     Comment:     Slight hemolysis detected  by analyzer. Results may be affected.   07/13/2023 3.2 (L) 3.5 - 5.2 mmol/L Final   07/13/2023 3.9 3.5 - 5.2 mmol/L Final   07/13/2023 4.4 3.5 - 5.2 mmol/L Final   07/13/2023 3.2 (L) 3.5 - 5.2 mmol/L Final   07/13/2023 4.0 3.5 - 5.2 mmol/L Final   07/12/2023 3.6 3.5 - 5.2 mmol/L Final      Chloride Chloride   Date Value Ref Range Status   07/14/2023 117 (H) 98 - 107 mmol/L Final   07/14/2023 116 (H) 98 - 107 mmol/L Final   07/14/2023 117 (H) 98 - 107 mmol/L Final   07/13/2023 115 (H) 98 - 107 mmol/L Final   07/13/2023 115 (H) 98 - 107 mmol/L Final   07/13/2023 116 (H) 98 - 107 mmol/L Final   07/13/2023 106 98 - 107 mmol/L Final   07/12/2023 103 98 - 107 mmol/L Final      CO2 CO2   Date Value Ref Range Status   07/14/2023 19.0 (L) 22.0 - 29.0 mmol/L Final   07/14/2023 18.0 (L) 22.0 - 29.0 mmol/L Final   07/14/2023 17.0 (L) 22.0 - 29.0 mmol/L Final   07/13/2023 17.0 (L) 22.0 - 29.0 mmol/L Final   07/13/2023 19.0 (L) 22.0 - 29.0 mmol/L Final   07/13/2023 17.0 (L) 22.0 - 29.0 mmol/L Final   07/13/2023 25.0 22.0 - 29.0 mmol/L Final   07/12/2023 23.0 22.0 - 29.0 mmol/L Final      BUN BUN   Date Value Ref Range Status   07/14/2023 19 8 - 23 mg/dL Final   07/14/2023 19 8 - 23 mg/dL Final   07/14/2023 18 8 - 23 mg/dL Final   07/13/2023 19 8 - 23 mg/dL Final   07/13/2023 17 8 - 23 mg/dL Final   07/13/2023 17 8 - 23 mg/dL Final   07/13/2023 19 8 - 23 mg/dL Final   07/12/2023 15 8 - 23 mg/dL Final      Creatinine Creatinine   Date Value Ref Range Status   07/14/2023 0.85 0.57 - 1.00 mg/dL Final   07/14/2023 0.94 0.57 - 1.00 mg/dL Final   07/14/2023 0.91 0.57 - 1.00 mg/dL Final   07/13/2023 0.73 0.57 - 1.00 mg/dL Final   07/13/2023 0.71 0.57 - 1.00 mg/dL Final   07/13/2023 0.76 0.57 - 1.00 mg/dL Final   07/13/2023 0.79 0.57 - 1.00 mg/dL Final   07/12/2023 0.69 0.57 - 1.00 mg/dL Final      Calcium Calcium   Date Value Ref Range Status   07/14/2023 8.8 8.6 - 10.5 mg/dL Final   07/14/2023 8.8 8.6 - 10.5 mg/dL Final   07/14/2023 8.5  (L) 8.6 - 10.5 mg/dL Final   07/13/2023 8.8 8.6 - 10.5 mg/dL Final   07/13/2023 9.1 8.6 - 10.5 mg/dL Final   07/13/2023 8.8 8.6 - 10.5 mg/dL Final   07/13/2023 8.8 8.6 - 10.5 mg/dL Final   07/13/2023 8.3 (L) 8.6 - 10.5 mg/dL Final   07/12/2023 8.7 8.6 - 10.5 mg/dL Final      PO4 No results found for: CAPO4   Albumin Albumin   Date Value Ref Range Status   07/14/2023 3.9 3.5 - 5.2 g/dL Final   07/14/2023 4.0 3.5 - 5.2 g/dL Final   07/13/2023 4.0 3.5 - 5.2 g/dL Final   07/13/2023 3.3 (L) 3.5 - 5.2 g/dL Final      Magnesium Magnesium   Date Value Ref Range Status   07/14/2023 2.3 1.6 - 2.4 mg/dL Final   07/14/2023 2.3 1.6 - 2.4 mg/dL Final   07/13/2023 2.2 1.6 - 2.4 mg/dL Final   07/13/2023 3.0 (H) 1.6 - 2.4 mg/dL Final   07/13/2023 1.4 (L) 1.6 - 2.4 mg/dL Final   07/12/2023 1.8 1.6 - 2.4 mg/dL Final   07/12/2023 2.0 1.6 - 2.4 mg/dL Final   07/11/2023 1.9 1.6 - 2.4 mg/dL Final      Uric Acid No results found for: URICACID       Imaging Results (Last 72 Hours)       Procedure Component Value Units Date/Time    XR Chest 1 View [[id number]] Collected: 07/14/23 0658     Updated: 07/14/23 0701    Narrative:      XR CHEST 1 VW    Date of Exam: 7/14/2023 2:20 AM EDT    Indication: Post-Op Heart Surgery    Comparison: 7/13/2023.    Findings:  There is a right internal jugular [place]-Anthony catheter with the tip in the main pulmonary outflow. Median sternotomy wires are present. Bibasilar chest tubes present. There are no airspace consolidations. Trace amount of pleural fluid present on the left..   No pneumothorax. The pulmonary vasculature appears within normal limits. The cardiac and mediastinal silhouette appear unremarkable. No acute osseous abnormality identified.      Impression:      Impression:  Trace left-sided pleural effusion. Otherwise, no acute process.      Electronically Signed: Sandra Hayes    7/14/2023 6:59 AM EDT    Workstation ID: [id number]    XR Chest 1 View [[id number]] Collected: 07/13/23 1346     Updated:  07/13/23 1356    Narrative:      XR CHEST 1 VW    Date of Exam: 7/13/2023 1:09 PM EDT    Indication: Post-Op Check Line & Tube Placement    Comparison: Preoperative AP chest x-ray 7/11/2023    Findings:  There is been interval postoperative changes from CABG. Tip of the endotracheal tube terminates in the mid to lower thoracic trachea approximately 3 cm above the brian. Tip of the enteric tube is not included, but courses below the level of the   diaphragm. Tip of the [place]-Anthony catheter terminates in the pulmonary outflow tract. There are bilateral chest tubes in place.    Oblique line over the right upper chest is likely associated with the external pad. No definite pneumothorax is seen on this semierect exam. There are mild bibasilar airspace opacities.      Impression:      Impression:  1.Interval postoperative changes with positioning of support tubes and lines, as above.  2.No definite pneumothorax on this semierect exam.  3.Mild bibasilar airspace opacities, likely due to atelectasis.      Electronically Signed: Vy Sellers    7/13/2023 1:54 PM EDT    Workstation ID: [id number]              Results for orders placed during the hospital encounter of 07/11/23    XR Chest 1 View    Narrative  XR CHEST 1 VW    Date of Exam: 7/14/2023 2:20 AM EDT    Indication: Post-Op Heart Surgery    Comparison: 7/13/2023.    Findings:  There is a right internal jugular [place]-Anthony catheter with the tip in the main pulmonary outflow. Median sternotomy wires are present. Bibasilar chest tubes present. There are no airspace consolidations. Trace amount of pleural fluid present on the left..  No pneumothorax. The pulmonary vasculature appears within normal limits. The cardiac and mediastinal silhouette appear unremarkable. No acute osseous abnormality identified.    Impression  Impression:  Trace left-sided pleural effusion. Otherwise, no acute process.      Electronically Signed: Sandra Hayes  7/14/2023 6:59 AM EDT  Workstation ID:  [id number]      XR Chest 1 View    Narrative  XR CHEST 1 VW    Date of Exam: 7/13/2023 1:09 PM EDT    Indication: Post-Op Check Line & Tube Placement    Comparison: Preoperative AP chest x-ray 7/11/2023    Findings:  There is been interval postoperative changes from CABG. Tip of the endotracheal tube terminates in the mid to lower thoracic trachea approximately 3 cm above the brian. Tip of the enteric tube is not included, but courses below the level of the  diaphragm. Tip of the [place]-Anthony catheter terminates in the pulmonary outflow tract. There are bilateral chest tubes in place.    Oblique line over the right upper chest is likely associated with the external pad. No definite pneumothorax is seen on this semierect exam. There are mild bibasilar airspace opacities.    Impression  Impression:  1.Interval postoperative changes with positioning of support tubes and lines, as above.  2.No definite pneumothorax on this semierect exam.  3.Mild bibasilar airspace opacities, likely due to atelectasis.      Electronically Signed: Vy Sellers  7/13/2023 1:54 PM EDT  Workstation ID: [id number]      XR Chest 1 View    Narrative  XR CHEST 1 VW    Date of Exam: 7/11/2023 1:32 AM EDT    Indication: cp    Comparison: 12/28/2010  Findings:  Heart and pulmonary vessels appear within normal limits. Lung fields are somewhat hyperinflated. There are no acute lung infiltrates or effusions.    Impression  Impression:  Mild hyperinflation.      Electronically Signed: Alyssa Beard MD  7/11/2023 7:08 AM EDT  Workstation ID: [id number]        Results for orders placed during the hospital encounter of 07/11/23    Duplex Carotid Ultrasound CAR    Interpretation Summary    Right internal carotid artery demonstrates a less than 50% stenosis.    Left internal carotid artery demonstrates a less than 50% stenosis.      ASSESSMENT / PLAN      Myocardial infarction    Unstable angina    Status post angioplasty with stent    Coronary artery disease  involving native coronary artery of native heart with unstable angina pectoris      Hyponatremia-likely due to decreased p.o. intake and free water deficit.  Negative fluid balance.  CAD status post CABG  Hypertension  Ischemic cardiomyopathy-EF 30 to 35%  Mild to moderate MR    Patient is nonoliguric  Gently hydrate with hypotonic IV fluid  Change normal saline to half-normal saline.  Check urine electrolytes  Hold off additional diuresis at this time.  Avoid hypotension/ACE inhibitor's or ARB's  Closely monitor renal function fluid status electrolytes      I discussed the patient's findings and my recommendations with patient, family, and consulting provider    Will follow along closely. Thank you for allowing us to see this patient in renal consultation.    [facility]  [phone]  MD Laci Moncada MD  07/14/23  13:51 EDT

## 2023-07-14 NOTE — CONSULTS
"Critical Care Consult Note   Dary Fabian : 1935 MRN:[id number] LOS:3 ROOM: 220/1     Reason for admission: NSTEMI (non-ST elevated myocardial infarction)     Assessment / Plan     NSTEMI / Multivessel CAD / Hx of stent placement in   S/P CABG x 3 off pump, EVH of right leg  CTS managing     Difficulty Ventilator wean  Lasix 20 given last night with improvement in u/o, Net IO Since Admission: 9,545 mL [23 0757]  CXR shows small left pleural effusion  No history of tobacco use  Echo pending this AM, Intra-op anesthesia RAMYA 23 showed \"post bypass EF 40%. Septal wall hypokinetic. No changes in valves.\" Pre-op Echo 23 EF 31-35%, RVSP mildly elevated  Decreasing sedation this AM, PS when more awake  Recommend continued diuresis       Code Status (Patient has no pulse and is not breathing): CPR (Attempt to Resuscitate)  Medical Interventions (Patient has pulse or is breathing): Full Support       Nutrition: NPO Diet NPO Type: Strict NPO Patient isn't on Tube Feeding     DVT prophylaxis:  Medical DVT prophylaxis orders are present.     History of Present illness     A 87 y.o. old female patient with PMH of CAD, Elevated cholesterol, HTN, presents to the hospital with complaints of episodes  at rest of chest pain without radiation associated with nausea. Troponin elevated 179, 285. Patient underwent a heart catheterization 23 which showed severe and critical triple-vessel CAD with recommendations of CABG. Patient was started on heparin after heart cath and evaluated by CTS. Patient was taken to OR for a CABG x 3 off pump, EVH of right leg on 23. Patient had difficulty weaning from ventilator overnight. Critical care was consulted to assist with extubation.     ACP: CPR, Full Intervention. Millie Jolley, the patient's daughter is listed as the patient's decision maker when she is unable.     Patient was seen and examined on 23 at 07:32 EDT .    Subjective / Review of " systems     Review of Systems   Reason unable to perform ROS: Intubated and sedated.      Past Medical/Surgical/Social/Family History & Allergies     Past Medical History:   Diagnosis Date    Coronary artery disease     Elevated cholesterol     Hypertension       Past Surgical History:   Procedure Laterality Date    CARDIAC CATHETERIZATION N/A 07/11/2023    Procedure: Left Heart Cath and coronary angiogram;  Surgeon: Yoana Vizcarra MD;  Location: [facility] CATH INVASIVE LOCATION;  Service: Cardiovascular;  Laterality: N/A;    HYSTERECTOMY        Social History     Socioeconomic History    Marital status:    Tobacco Use    Smoking status: Never    Smokeless tobacco: Never   Vaping Use    Vaping Use: Never used   Substance and Sexual Activity    Alcohol use: Never    Drug use: Never    Sexual activity: Not Currently      History reviewed. No pertinent family history.   No Known Allergies     Home Medications     Prior to Admission medications    Medication Sig Start Date End Date Taking? Authorizing Provider   acetaminophen (TYLENOL) 325 MG tablet Take 2 tablets by mouth Every 6 (Six) Hours As Needed for Mild Pain.   Yes Provider, MD Rain        Objective / Physical Exam     Vital signs:  Temp: 98.2 °F (36.8 °C)  BP: 116/64  Heart Rate: 89  Resp: 25  SpO2: 100 %  Weight: 67.6 kg (149 lb)    Admission Weight: Weight: 56.7 kg (125 lb)    Physical Exam  Vitals and nursing note reviewed.   Constitutional:       General: She is not in acute distress.     Interventions: She is sedated and intubated.   HENT:      Head: Normocephalic.      Mouth/Throat:      Mouth: Mucous membranes are moist.      Pharynx: Oropharynx is clear.      Comments: Oral ETT in place  Eyes:      Extraocular Movements: Extraocular movements intact.      Conjunctiva/sclera: Conjunctivae normal.      Pupils: Pupils are equal, round, and reactive to light.   Cardiovascular:      Pulses: Normal pulses.      Heart sounds: Normal heart  sounds.      Comments: Paced  Pulmonary:      Effort: She is intubated.      Breath sounds: Examination of the right-upper field reveals rhonchi. Examination of the left-upper field reveals rhonchi. Rhonchi present.   Abdominal:      General: Bowel sounds are normal.      Palpations: Abdomen is soft.   Musculoskeletal:      Right lower leg: No edema.      Left lower leg: No edema.   Skin:     General: Skin is warm and dry.          Neurological:      Comments: Intubated and sedated        Labs     Results from last 7 days   Lab Units 07/14/23  0327 07/14/23  0318 07/13/23 2006 07/13/23  1745 07/13/23  1712 07/13/23  1613 07/13/23  1559 07/13/23  1309 07/13/23  1228 07/13/23  1121   WBC 10*3/mm3 18.40*  --  13.90*  --   --   --  12.80*  --  13.30* 12.40*   HEMATOCRIT % 36.1  --  31.9*  --   --   --  31.2*  --  32.1* 27.1*   HEMATOCRIT POC %  --  34*  --  30* 30*   < >  --    < >  --   --    PLATELETS 10*3/mm3 177  --  148  --   --   --  138*  --  138* 67*    < > = values in this interval not displayed.      Results from last 7 days   Lab Units 07/14/23  0327 07/13/23  2342 07/13/23 2006 07/13/23  1559 07/13/23  1228 07/13/23  0020   SODIUM mmol/L 148* 148* 148* 148* 144 140   POTASSIUM mmol/L 4.0 3.2* 3.9 4.4 3.2* 4.0   CHLORIDE mmol/L 117*  --  115* 115* 116* 106   CO2 mmol/L 17.0*  --  17.0* 19.0* 17.0* 25.0   BUN mg/dL 18  --  19 17 17 19   CREATININE mg/dL 0.91  --  0.73 0.71 0.76 0.79        Imaging     XR Chest 1 View    Result Date: 7/14/2023  Impression: Trace left-sided pleural effusion. Otherwise, no acute process. Electronically Signed: Sandra Hayes  7/14/2023 6:59 AM EDT  Workstation ID: [id number]    XR Chest 1 View    Result Date: 7/13/2023  Impression: 1.Interval postoperative changes with positioning of support tubes and lines, as above. 2.No definite pneumothorax on this semierect exam. 3.Mild bibasilar airspace opacities, likely due to atelectasis. Electronically Signed: Vy Sellers  7/13/2023 1:54  PM EDT  Workstation ID: [id number]      Chest X ray: My independent assessment showed no infiltrates small left-sided effusions    EKG: My independent evaluation showed normal sinus rhythm, no ST -T changes    Current Medications     Scheduled Meds:  aspirin, 81 mg, Oral, Daily  atorvastatin, 40 mg, Oral, Nightly  ceFAZolin, 2 g, Intravenous, Q8H  chlorhexidine, 15 mL, Mouth/Throat, Q12H  enoxaparin, 40 mg, Subcutaneous, Daily  magnesium sulfate, 1 g, Intravenous, Q8H  metoprolol tartrate, 12.5 mg, Oral, Q12H  mupirocin, , Each Nare, BID  pantoprazole, 40 mg, Oral, Q AM  senna-docusate sodium, 2 tablet, Oral, Nightly         Continuous Infusions:  dexmedetomidine, 0.2-1.5 mcg/kg/hr, Last Rate: 1 mcg/kg/hr (07/14/23 0627)  EPINEPHrine, 0.02-0.3 mcg/kg/min, Last Rate: 0.06 mcg/kg/min (07/14/23 0721)  insulin, 0-100 Units/hr, Last Rate: 2 Units/hr (07/14/23 0041)  milrinone, 0.25 mcg/kg/min, Last Rate: 0.375 mcg/kg/min (07/14/23 0721)  niCARdipine, 5-15 mg/hr  nitroglycerin, 10-50 mcg/min, Last Rate: Stopped (07/13/23 1309)  norepinephrine, 0.02-0.3 mcg/kg/min, Last Rate: 0.05 mcg/kg/min (07/14/23 0500)  sodium chloride, 125 mL/hr, Last Rate: 125 mL/hr (07/14/23 0701)  vasopressin, 0.04 Units/min, Last Rate: 0.04 Units/min (07/14/23 0544)         ARIES Salazar   Critical Care  07/14/23   07:32 EDT

## 2023-07-14 NOTE — CASE MANAGEMENT/SOCIAL WORK
Continued Stay Note  MAHOGANY Jr     Patient Name: Dary Fabian  MRN: [id number]  Today's Date: 7/14/2023    Admit Date: 7/11/2023    Plan: DC Plan: Pending Clinical Course and PT/OT evaluations. From home with daughter. CABG 7/13/23.   Discharge Plan       Row Name 07/14/23 1036       Plan    Plan DC Plan: Pending Clinical Course and PT/OT evaluations. From home with daughter. CABG 7/13/23.    Provided Post Acute Provider List? N/A    Provided Post Acute Provider Quality & Resource List? N/A    Plan Comments CM spoke with patient's nurse and [facility] NP Mary Walton to obtain clinical updates. Additionally,CM spoke with patient's nurse, intensivist, and NP to obtain clinical updates. Intensivist recommending more diuresis. NP reports patient extubated at 10:30 this morning and will continue to watch very closely. CM will continue to follow for any further needs and adjust discharge plan accordingly. DC Barriers: POD 1 OHS, Recent extubation,Central Line, Arterial Line, Pacer Wires, Chest Tubes x3, Insulin gtt, IVF's, IV pressors x3, and IV abx/Tylenol.               Expected Discharge Date and Time       Expected Discharge Date Expected Discharge Time    Jul 19, 2023        Tiffanie Teresa RN    Office Phone: [phone]  Office Cell:     [phone]

## 2023-07-14 NOTE — THERAPY EVALUATION
Patient Name: Dary Fabian  : 1935    MRN: [id number]                              Today's Date: 2023       Admit Date: 2023    Visit Dx:     ICD-10-CM ICD-9-CM   1. NSTEMI (non-ST elevated myocardial infarction)  I21.4 410.70   2. Unstable angina  I20.0 411.1   3. Status post angioplasty with stent  Z95.820 V45.89   4. Coronary artery disease involving native coronary artery of native heart with unstable angina pectoris  I25.110 414.01     411.1     Patient Active Problem List   Diagnosis    Myocardial infarction    Unstable angina    Status post angioplasty with stent    Coronary artery disease involving native coronary artery of native heart with unstable angina pectoris    Hyperlipidemia    Hypertension    S/P cardiac cath     Past Medical History:   Diagnosis Date    Coronary artery disease     Elevated cholesterol     Hypertension      Past Surgical History:   Procedure Laterality Date    CARDIAC CATHETERIZATION N/A 2023    Procedure: Left Heart Cath and coronary angiogram;  Surgeon: Yoana Vizcarra MD;  Location: [facility] CATH INVASIVE LOCATION;  Service: Cardiovascular;  Laterality: N/A;    HYSTERECTOMY        General Information       Row Name 23 1455          OT Time and Intention    Document Type re-evaluation  -MS     Mode of Treatment occupational therapy  -MS       Row Name 23 1455          General Information    Patient Profile Reviewed yes  -MS     Prior Level of Function independent:;ADL's;min assist:;transfer  pt independent ADLs, assist with bathing  -MS     Existing Precautions/Restrictions cardiac;fall;oxygen therapy device and L/min;sternal  -MS     Barriers to Rehab medically complex  -MS       Row Name 23 1455          Occupational Profile    Reason for Services/Referral (Occupational Profile) Pt is an 86 y/o F admitted to [facility] 23 with NSTEMI. Pt is being re-evaluation this date as pt is POD1 CABGx3 by Dr. Galindo. PMHx significant for  CAD and HTN. At baseline pt resides with daughter in ground floor apartment with 2 BRONSON. Pt typically independent with most ADLs, daughter assist with bathing, use of rollator for mobility. Pt reports 5-6 falls within last 6 mos.  -MS     Environmental Supports and Barriers (Occupational Profile) supportive family, lives with daughter  -MS       Row Name 07/14/23 1459          Living Environment    People in Home child(tevin), adult  -MS       Row Name 07/14/23 145          Home Main Entrance    Number of Stairs, Main Entrance two  -MS     Stair Railings, Main Entrance railings safe and in good condition  -MS       Row Name 07/14/23 1454          Stairs Within Home, Primary    Number of Stairs, Within Home, Primary none  -MS       Row Name 07/14/23 145          Cognition    Orientation Status (Cognition) oriented x 4  pt A&Ox4, however at times appears confused with conversation, pt kept eyes closed most of time  -MS       Row Name 07/14/23 1459          Safety Issues, Functional Mobility    Impairments Affecting Function (Mobility) balance;endurance/activity tolerance;pain;postural/trunk control;range of motion (ROM);strength;shortness of breath  -MS               User Key  (r) = Recorded By, (t) = Taken By, (c) = Cosigned By      Initials Name Provider Type    MS Ashli Denton, OT Occupational Therapist                     Mobility/ADL's       Row Name 07/14/23 0846          Bed Mobility    Bed Mobility bed mobility (all) activities;supine-sit;sit-supine  -MS     Supine-Sit [place] (Bed Mobility) dependent (less than 25% patient effort);2 person assist  -MS     Sit-Supine [place] (Bed Mobility) dependent (less than 25% patient effort);2 person assist  -MS     Bed Mobility, Safety Issues decreased use of arms for pushing/pulling;impaired trunk control for bed mobility;decreased use of legs for bridging/pushing  -MS     Assistive Device (Bed Mobility) head of bed elevated  -MS       Row Name 07/14/23 7286           Transfers    Comment, (Transfers) not safe for transfers this date, pt lethargic, weak and increased pain  -MS       Row Name 07/14/23 1459          Sit-Stand Transfer    Sit-Stand [place] (Transfers) not tested  -MS       [facility] Name 07/14/23 1459          Mobility    Extremity Weight-bearing Status left upper extremity;right upper extremity  -MS     Left Upper Extremity (Weight-bearing Status) touch down weight-bearing (TDWB)  -MS     Right Upper Extremity (Weight-bearing Status) touch down weight-bearing (TDWB)  -MS               User Key  (r) = Recorded By, (t) = Taken By, (c) = Cosigned By      Initials Name Provider Type    Ashli Mariee OT Occupational Therapist                   Obj/Interventions       [facility] Name 07/14/23 1501          Sensory Assessment (Somatosensory)    Sensory Assessment (Somatosensory) unable/difficult to assess  -MS       Row Name 07/14/23 1501          Vision Assessment/Intervention    Visual Impairment/Limitations unable/difficult to assess  -MS       Row Name 07/14/23 1501          Range of Motion Comprehensive    Comment, General Range of Motion BUE within sternal precautions  -MS       Row Name 07/14/23 1501          Strength Comprehensive (MMT)    Comment, General Manual Muscle Testing (MMT) Assessment BUE not assessed d/t sternal precautions  -MS       [facility] Name 07/14/23 1501          Balance    Balance Assessment sitting static balance  -MS     Static Sitting Balance maximum assist;standby assist  -MS     Position, Sitting Balance supported;sitting edge of bed  -MS     Comment, Balance poor trunk control, max A progressed to SBA  -MS               User Key  (r) = Recorded By, (t) = Taken By, (c) = Cosigned By      Initials Name Provider Type    Ashli Mariee OT Occupational Therapist                   Goals/Plan       [facility] Name 07/14/23 1513          Bed Mobility Goal 1 (OT)    Activity/Assistive Device (Bed Mobility Goal 1, OT) bed mobility activities, all   -MS     [place] Level/Cues Needed (Bed Mobility Goal 1, OT) modified independence  -MS     Time Frame (Bed Mobility Goal 1, OT) long term goal (LTG);2 weeks  -MS     Progress/Outcomes (Bed Mobility Goal 1, OT) goal ongoing  -MS       Row Name 07/14/23 1513          Transfer Goal 1 (OT)    Activity/Assistive Device (Transfer Goal 1, OT) transfers, all  -MS     [place] Level/Cues Needed (Transfer Goal 1, OT) modified independence  -MS     Time Frame (Transfer Goal 1, OT) long term goal (LTG);2 weeks  -MS     Progress/Outcome (Transfer Goal 1, OT) goal ongoing  -MS       Row Name 07/14/23 1513          Dressing Goal 1 (OT)    Activity/Device (Dressing Goal 1, OT) dressing skills, all  -MS     [place]/Cues Needed (Dressing Goal 1, OT) modified independence  -MS     Time Frame (Dressing Goal 1, OT) long term goal (LTG);2 weeks  -MS     Progress/Outcome (Dressing Goal 1, OT) goal ongoing  -MS       Row Name 07/14/23 1513          Toileting Goal 1 (OT)    Activity/Device (Toileting Goal 1, OT) toileting skills, all  -MS     [place] Level/Cues Needed (Toileting Goal 1, OT) modified independence  -MS     Time Frame (Toileting Goal 1, OT) long term goal (LTG);2 weeks  -MS     Progress/Outcome (Toileting Goal 1, OT) goal ongoing  -MS       Row Name 07/14/23 1513          Grooming Goal 1 (OT)    Activity/Device (Grooming Goal 1, OT) grooming skills, all  -MS     [place] (Grooming Goal 1, OT) modified independence  in standing  -MS     Time Frame (Grooming Goal 1, OT) long term goal (LTG);2 weeks  -MS     Progress/Outcome (Grooming Goal 1, OT) goal ongoing  -MS       Row Name 07/14/23 1513          Therapy Assessment/Plan (OT)    Planned Therapy Interventions (OT) activity tolerance training;adaptive equipment training;BADL retraining;functional balance retraining;IADL retraining;occupation/activity based interventions;passive ROM/stretching;patient/caregiver education/training;transfer/mobility  retraining;ROM/therapeutic exercise  -MS               User Key  (r) = Recorded By, (t) = Taken By, (c) = Cosigned By      Initials Name Provider Type    MS Ashli Denton, OT Occupational Therapist                   Clinical Impression       Row Name 07/14/23 1502          Pain Assessment    Pretreatment Pain Rating 8/10  -MS     Posttreatment Pain Rating 8/10  -MS     Pain Location incisional  -MS     Pain Location - chest  -MS     Pain Intervention(s) Repositioned;Emotional support;Ambulation/increased activity  -MS       Row Name 07/14/23 1502          Plan of Care Review    Plan of Care Reviewed With patient  -MS     Progress no change  -MS     Outcome Evaluation Pt is an 88 y/o F admitted to [facility] 7/11/23 with NSTEMI. Pt is being re-evaluation this date as pt is POD1 CABGx3 by Dr. Galindo. PMHx significant for CAD and HTN. At baseline pt resides with daughter in ground floor apartment with 2 BRONSON. Pt typically independent with most ADLs, daughter assist with bathing, use of rollator for mobility. Pt reports 5-6 falls within last 6 mos. This date, pt A&Ox4, however pt appears confused during conversation at times. Pt appears lethargic and c/o pain, however eager to participate with therapy. Pt educated and verbalized understanding of sternal precautions. Pt requires dependent x2 for supine<>sit and max A to tolerate sitting balanace. Pt progressed to SBA with sitting balance x5 minutes. Pt appears fatigued with sitting tolerance, requiring her to return to supine. Pt likely to progress well with continued participation with skilled OT intervention within acute setting. Dc recommendation pending as pt able to participate in further activity. OT to follow while admitted.  -MS       Row Name 07/14/23 1502          Therapy Assessment/Plan (OT)    Rehab Potential (OT) good, to achieve stated therapy goals  -MS     Criteria for Skilled Therapeutic Interventions Met (OT) yes;meets criteria;skilled treatment is  necessary  -MS     Therapy Frequency (OT) 5 times/wk  -MS     Predicted Duration of Therapy Intervention (OT) until d/c  -MS       Row Name 07/14/23 1502          Therapy Plan Review/Discharge Plan (OT)    Anticipated Discharge Disposition (OT) other (see comments)  d/c recommendation pending  -MS       Row Name 07/14/23 1502          Vital Signs    Pre Systolic BP Rehab 114  -MS     Pre Treatment Diastolic BP 62  -MS     Post Systolic BP Rehab 111  -MS     Post Treatment Diastolic BP 51  -MS     Pretreatment Heart Rate (beats/min) 90  -MS     Posttreatment Heart Rate (beats/min) 87  -MS     Pre SpO2 (%) 97  -MS     O2 Delivery Pre Treatment nasal cannula  3L  -MS     O2 Delivery Intra Treatment nasal cannula  3L  -MS     Post SpO2 (%) 98  -MS     O2 Delivery Post Treatment nasal cannula  3L  -MS     Pre Patient Position Supine  -MS     Intra Patient Position Sitting  -MS     Post Patient Position Supine  -MS     Recovery Time VSS  -MS       Row Name 07/14/23 1502          Positioning and Restraints    Pre-Treatment Position in bed  -MS     Post Treatment Position bed  -MS     In Bed notified nsg;supine;with nsg  -MS               User Key  (r) = Recorded By, (t) = Taken By, (c) = Cosigned By      Initials Name Provider Type    Ashli Mariee, OT Occupational Therapist                   Outcome Measures       Row Name 07/14/23 1514          How much help from another is currently needed...    Putting on and taking off regular lower body clothing? 1  -MS     Bathing (including washing, rinsing, and drying) 1  -MS     Toileting (which includes using toilet bed pan or urinal) 1  -MS     Putting on and taking off regular upper body clothing 1  -MS     Taking care of personal grooming (such as brushing teeth) 2  -MS     Eating meals 2  -MS     AM-PAC 6 Clicks Score (OT) 8  -MS       Row Name 07/14/23 1514          Functional Assessment    Outcome Measure Options AM-PAC 6 Clicks Daily Activity (OT)  -MS                User Key  (r) = Recorded By, (t) = Taken By, (c) = Cosigned By      Initials Name Provider Type    MS Ashli Denton OT Occupational Therapist                    Occupational Therapy Education       Title: PT OT SLP Therapies (In Progress)       Topic: Occupational Therapy (In Progress)       Point: ADL training (Done)       Description:   Instruct learner(s) on proper safety adaptation and remediation techniques during self care or transfers.   Instruct in proper use of assistive devices.                  Learning Progress Summary             Patient Acceptance, E,TB, VU,NR by MS at 7/14/2023 1514    Acceptance, E,TB, VU by MS at 7/12/2023 1602                         Point: Home exercise program (Not Started)       Description:   Instruct learner(s) on appropriate technique for monitoring, assisting and/or progressing therapeutic exercises/activities.                  Learner Progress:  Not documented in this visit.              Point: Precautions (Done)       Description:   Instruct learner(s) on prescribed precautions during self-care and functional transfers.                  Learning Progress Summary             Patient Acceptance, E,TB, VU,NR by MS at 7/14/2023 1514    Acceptance, E,TB, VU by MS at 7/12/2023 1602                         Point: Body mechanics (Done)       Description:   Instruct learner(s) on proper positioning and spine alignment during self-care, functional mobility activities and/or exercises.                  Learning Progress Summary             Patient Acceptance, E,TB, VU,NR by MS at 7/14/2023 1514    Acceptance, E,TB, VU by MS at 7/12/2023 1602                                         User Key       Initials Effective Dates Name Provider Type Discipline    MS 07/13/22 -  Ashli Denton OT Occupational Therapist OT                  OT Recommendation and Plan  Planned Therapy Interventions (OT): activity tolerance training, adaptive equipment training, BADL retraining, functional  balance retraining, IADL retraining, occupation/activity based interventions, passive ROM/stretching, patient/caregiver education/training, transfer/mobility retraining, ROM/therapeutic exercise  Therapy Frequency (OT): 5 times/wk  Plan of Care Review  Plan of Care Reviewed With: patient  Progress: no change  Outcome Evaluation: Pt is an 88 y/o F admitted to [facility] 7/11/23 with NSTEMI. Pt is being re-evaluation this date as pt is POD1 CABGx3 by Dr. Galindo. PMHx significant for CAD and HTN. At baseline pt resides with daughter in ground floor apartment with 2 BRONSON. Pt typically independent with most ADLs, daughter assist with bathing, use of rollator for mobility. Pt reports 5-6 falls within last 6 mos. This date, pt A&Ox4, however pt appears confused during conversation at times. Pt appears lethargic and c/o pain, however eager to participate with therapy. Pt educated and verbalized understanding of sternal precautions. Pt requires dependent x2 for supine<>sit and max A to tolerate sitting balanace. Pt progressed to SBA with sitting balance x5 minutes. Pt appears fatigued with sitting tolerance, requiring her to return to supine. Pt likely to progress well with continued participation with skilled OT intervention within acute setting. Dc recommendation pending as pt able to participate in further activity. OT to follow while admitted.     Time Calculation:              Ashli Denton OT  7/14/2023

## 2023-07-14 NOTE — PLAN OF CARE
Goal Outcome Evaluation:  Plan of Care Reviewed With: patient        Progress: no change  Outcome Evaluation: Pt is an 86 y/o F admitted to [facility] 7/11/23 with NSTEMI. Pt is being re-evaluation this date as pt is POD1 CABGx3 by Dr. Galindo. PMHx significant for CAD and HTN. At baseline pt resides with daughter in ground floor apartment with 2 [address]. Pt typically independent with most ADLs, daughter assist with bathing, use of rollator for mobility. Pt reports 5-6 falls within last 6 mos. This date, pt A&Ox4, however pt appears confused during conversation at times. Pt appears lethargic and c/o pain, however eager to participate with therapy. Pt educated and verbalized understanding of sternal precautions. Pt requires dependent x2 for supine<>sit and max A to tolerate sitting balanace. Pt progressed to SBA with sitting balance x5 minutes. Pt appears fatigued with sitting tolerance, requiring her to return to supine. Pt likely to progress well with continued participation with skilled OT intervention within acute setting. Dc recommendation pending as pt able to participate in further activity. OT to follow while admitted.      Anticipated Discharge Disposition (OT): other (see comments) (d/c recommendation pending)

## 2023-07-14 NOTE — PLAN OF CARE
Goal Outcome Evaluation:  Plan of Care Reviewed With: patient           Outcome Evaluation: Dary Fabian is an 87 y.o. female with multivessel CAD and NSTEMI.  She was initially evaluated by PT on 7/12 and is now reassessed following CABG x 3 with vein harvest from [facility] on 7/13 by Dr. Galindo.  At time of reassessment pt is A&Ox 4 but has some difficulty understanding sternal precautions.  Pt requires dependent assist of 2 to transfer to sit EOB.  PT able to tolerate EOB x 4-1/2 minutes with mod A for balance.  Pt is able to hold her balance briefly with CGA but is limited by fatigue.  PT will continue to see 5 days/week and progress as able. Pt may require SNF at discharge for continued therapy.      Anticipated Discharge Disposition (PT): skilled nursing facility

## 2023-07-14 NOTE — PROGRESS NOTES
Referring Provider: Elias Galindo, *    Reason for follow-up:  Non-STEMI  Severe and critical triple-vessel coronary artery disease.  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      ROS    Unable to-intubated and sedated.    History  Past Medical History:   Diagnosis Date    Coronary artery disease     Elevated cholesterol     Hypertension        Past Surgical History:   Procedure Laterality Date    CARDIAC CATHETERIZATION N/A 07/11/2023    Procedure: Left Heart Cath and coronary angiogram;  Surgeon: Yoana Vizcarra MD;  Location: [facility] CATH INVASIVE LOCATION;  Service: Cardiovascular;  Laterality: N/A;    HYSTERECTOMY         History reviewed. No pertinent family history.    Social History     Tobacco Use    Smoking status: Never    Smokeless tobacco: Never   Vaping Use    Vaping Use: Never used   Substance Use Topics    Alcohol use: Never    Drug use: Never        Medications Prior to Admission   Medication Sig Dispense Refill Last Dose    acetaminophen (TYLENOL) 325 MG tablet Take 2 tablets by mouth Every 6 (Six) Hours As Needed for Mild Pain.          Allergies  Patient has no known allergies.    Scheduled Meds:aspirin, 81 mg, Oral, Daily  atorvastatin, 40 mg, Oral, Nightly  ceFAZolin, 2 g, Intravenous, Q8H  chlorhexidine, 15 mL, Mouth/Throat, Q12H  enoxaparin, 40 mg, Subcutaneous, Daily  magnesium sulfate, 1 g, Intravenous, Q8H  metoprolol tartrate, 12.5 mg, Oral, Q12H  mupirocin, , Each Nare, BID  pantoprazole, 40 mg, Oral, Q AM  senna-docusate sodium, 2 tablet, Oral, Nightly      Continuous Infusions:dexmedetomidine, 0.2-1.5 mcg/kg/hr, Last Rate: 1 mcg/kg/hr (07/14/23 0627)  EPINEPHrine, 0.02-0.3 mcg/kg/min, Last Rate: 0.04 mcg/kg/min (07/14/23 0337)  insulin, 0-100 Units/hr, Last Rate: 2 Units/hr (07/14/23 0041)  milrinone, 0.25 mcg/kg/min, Last Rate: 0.25 mcg/kg/min (07/14/23  "0018)  niCARdipine, 5-15 mg/hr  nitroglycerin, 10-50 mcg/min, Last Rate: Stopped (07/13/23 1309)  norepinephrine, 0.02-0.3 mcg/kg/min, Last Rate: 0.05 mcg/kg/min (07/14/23 0500)  sodium chloride, 125 mL/hr, Last Rate: 125 mL/hr (07/14/23 0335)  vasopressin, 0.04 Units/min, Last Rate: 0.04 Units/min (07/14/23 0544)      PRN Meds:.  acetaminophen **OR** acetaminophen **OR** acetaminophen    bisacodyl    bisacodyl    Calcium Replacement - Follow Nurse / BPA Driven Protocol    cyclobenzaprine    dextrose    dextrose    glucagon (human recombinant)    HYDROcodone-acetaminophen    Magnesium Cardiology Dose Replacement - Follow Nurse / BPA Driven Protocol    magnesium hydroxide    Morphine **AND** naloxone    Morphine    niCARdipine    nitroglycerin    norepinephrine    ondansetron    oxyCODONE    Phosphorus Replacement - Follow Nurse / BPA Driven Protocol    polyethylene glycol    Potassium Replacement - Follow Nurse / BPA Driven Protocol    Objective     VITAL SIGNS  Vitals:    07/14/23 0506 07/14/23 0600 07/14/23 0622 07/14/23 0642   BP:  118/68  116/64   Pulse: 89 89 89    Resp:       Temp: 98.1 °F (36.7 °C) 98.2 °F (36.8 °C) 98.2 °F (36.8 °C)    TempSrc:       SpO2: 100% 100% 100%    Weight:  67.8 kg (149 lb 7.6 oz)  67.6 kg (149 lb)   Height:    152.4 cm (60\")       Flowsheet Rows      Flowsheet Row First Filed Value   Admission Height 152.4 cm (60\") Documented at 07/11/2023 0053   Admission Weight 56.7 kg (125 lb) Documented at 07/11/2023 0053              Intake/Output Summary (Last 24 hours) at 7/14/2023 0643  Last data filed at 7/14/2023 0600  Gross per 24 hour   Intake 25372 ml   Output 3030 ml   Net 8563 ml          TELEMETRY: Sinus rhythm    Physical Exam:  The patient is intubated and is on the respirator.  Vital signs as noted above.  Head and neck revealed no carotid bruits or jugular venous distention.  No thyromegaly or lymphadenopathy is present  Lungs clear.  No wheezing.  Breath sounds are normal " bilaterally.  Heart normal first and second heart sounds.  No murmur. No precordial rub is present.  No gallop is present.  Abdomen soft and nontender.  No organomegaly is present.  Extremities with good peripheral pulses without any pedal edema.  Cardiac catheterization site looks normal.  Skin warm and dry.  Visible incisions are looking well.  Musculoskeletal system is grossly normal  CNS-intubated and is on the respirator.    Reviewed and updated.  Results Review:   I reviewed the patient's new clinical results.  Lab Results (last 24 hours)       Procedure Component Value Units Date/Time    POC Glucose Once [[id number]]  (Abnormal) Collected: 07/14/23 0456    Specimen: Blood Updated: 07/14/23 0458     Glucose 160 mg/dL      Comment: Serial Number: [id number]:  [id number]       Blood Gas, Mixed [[id number]]  (Abnormal) Collected: 07/14/23 0323    Specimen: Blood Updated: 07/14/23 0452     pH, mixed 7.29 pH units      PCO2 MIXED 41.1 mmHg      PO2 MIXED 48.3 mmHg      HCO3 MIXED 19.8 mmol/L      CO2 Content 21.1 mmol/L      Base Excess, Arterial -6.3 mmol/L      Comment: Serial Number: [id number]:  [id number]        O2 SATURATION MIXED 79.2 %      Hemodilution No     Site Swanz Anthony     Arnold's Test N/A     Modality Adult Vent     FIO2 40 %      Set [facility] Resp Rate 12     Rate 25 Breaths/minute      PEEP 5     Base Deficit -6.7 mEq/liter      Ventilator Mode ;    Renal Function Panel [[id number]]  (Abnormal) Collected: 07/14/23 0327    Specimen: Blood Updated: 07/14/23 0414     Glucose 169 mg/dL      BUN 18 mg/dL      Creatinine 0.91 mg/dL      Sodium 148 mmol/L      Potassium 4.0 mmol/L      Comment: Slight hemolysis detected by analyzer. Results may be affected.        Chloride 117 mmol/L      CO2 17.0 mmol/L      Calcium 8.5 mg/dL      Albumin 4.0 g/dL      Phosphorus 3.2 mg/dL      Anion Gap 14.0 mmol/L      BUN/Creatinine Ratio 19.8     eGFR 61.2 mL/min/1.73     Narrative:      GFR Normal >60  Chronic  Kidney Disease <60  Kidney Failure <15    The GFR formula is only valid for adults with stable renal function between ages 18 and 70.    Lactic Acid, Plasma [[id number]]  (Abnormal) Collected: 07/14/23 0327    Specimen: Blood Updated: 07/14/23 0414     Lactate 3.0 mmol/L     Magnesium [[id number]]  (Normal) Collected: 07/14/23 0327    Specimen: Blood Updated: 07/14/23 0410     Magnesium 2.3 mg/dL     Calcium, Ionized [[id number]]  (Normal) Collected: 07/14/23 0327    Specimen: Blood Updated: 07/14/23 0359     Ionized Calcium 1.22 mmol/L     Protime-INR [[id number]]  (Abnormal) Collected: 07/14/23 0327    Specimen: Blood Updated: 07/14/23 0357     Protime 12.6 Seconds      INR 1.19    CBC & Differential [[id number]]  (Abnormal) Collected: 07/14/23 0327    Specimen: Blood Updated: 07/14/23 0344    Narrative:      The following orders were created for panel order CBC & Differential.  Procedure                               Abnormality         Status                     ---------                               -----------         ------                     CBC Auto Differential[[id number]]        Abnormal            Final result                 Please view results for these tests on the individual orders.    CBC Auto Differential [[id number]]  (Abnormal) Collected: 07/14/23 0327    Specimen: Blood Updated: 07/14/23 0344     WBC 18.40 10*3/mm3      RBC 4.00 10*6/mm3      Hemoglobin 11.7 g/dL      Hematocrit 36.1 %      MCV 90.2 fL      MCH 29.2 pg      MCHC 32.3 g/dL      RDW 15.0 %      RDW-SD 46.8 fl      MPV 8.5 fL      Platelets 177 10*3/mm3      Neutrophil % 88.0 %      Lymphocyte % 6.0 %      Monocyte % 6.0 %      Eosinophil % 0.0 %      Basophil % 0.0 %      Neutrophils, Absolute 16.20 10*3/mm3      Lymphocytes, Absolute 1.10 10*3/mm3      Monocytes, Absolute 1.10 10*3/mm3      Eosinophils, Absolute 0.00 10*3/mm3      Basophils, Absolute 0.00 10*3/mm3      nRBC 0.0 /100 WBC     POC Glucose Once [[id number]]  (Abnormal)  Collected: 07/14/23 0318    Specimen: Blood Updated: 07/14/23 0321     Glucose 167 mg/dL      Comment: Serial Number: [id number]:  [id number]       POCT Electrolytes +HGB +HCT [[id number]]  (Abnormal) Collected: 07/14/23 0318    Specimen: Blood Updated: 07/14/23 0321     Sodium 148 mmol/L      POC Potassium 3.9 mmol/L      Ionized Calcium 1.25 mmol/L      Comment: Serial Number: [id number]:  [id number]        Glucose 167 mg/dL      Hematocrit 34 %      Hemoglobin 11.5 g/dL     Blood Gas, Arterial - [[id number]]  (Abnormal) Collected: 07/14/23 0318    Specimen: Arterial Blood Updated: 07/14/23 0321     Site Arterial Line     Arnold's Test N/A     pH, Arterial 7.330 pH units      pCO2, Arterial 34.8 mm Hg      pO2, Arterial 118.6 mm Hg      HCO3, Arterial 18.3 mmol/L      Base Excess, Arterial -6.8 mmol/L      Comment: Serial Number: [id number]:  [id number]        O2 Saturation, Arterial 98.4 %      Barometric Pressure for Blood Gas --     Comment: N/A        Modality Adult Vent     FIO2 40 %      Ventilator Mode ;AC     Set Tidal Volume 400     Rate 25 Breaths/minute      PEEP 5     Hemodilution No     Respiratory Rate 12    POC Glucose Once [[id number]]  (Abnormal) Collected: 07/14/23 0251    Specimen: Blood Updated: 07/14/23 0252     Glucose 159 mg/dL      Comment: Serial Number: [id number]:  [id number]       POC Glucose Once [[id number]]  (Abnormal) Collected: 07/14/23 0041    Specimen: Blood Updated: 07/14/23 0042     Glucose 159 mg/dL      Comment: Serial Number: [id number]:  [id number]       Magnesium [[id number]]  (Normal) Collected: 07/13/23 2342    Specimen: Blood Updated: 07/14/23 0009     Magnesium 2.2 mg/dL     Sodium [[id number]]  (Abnormal) Collected: 07/13/23 2342    Specimen: Blood Updated: 07/14/23 0004     Sodium 148 mmol/L     Potassium [[id number]]  (Abnormal) Collected: 07/13/23 2342    Specimen: Blood Updated: 07/14/23 0004     Potassium 3.2 mmol/L     Calcium [[id number]]  (Normal) Collected:  07/13/23 2342    Specimen: Blood Updated: 07/14/23 0004     Calcium 8.8 mg/dL     Calcium, Ionized [[id number]]  (Normal) Collected: 07/13/23 2342    Specimen: Blood Updated: 07/13/23 2359     Ionized Calcium 1.25 mmol/L     POC Glucose Once [[id number]]  (Abnormal) Collected: 07/13/23 2341    Specimen: Blood Updated: 07/13/23 2342     Glucose 149 mg/dL      Comment: Serial Number: [id number]:  [id number]       POC Glucose Once [[id number]]  (Abnormal) Collected: 07/13/23 2230    Specimen: Blood Updated: 07/13/23 2231     Glucose 159 mg/dL      Comment: Serial Number: [id number]:  [id number]       POC Glucose Once [[id number]]  (Abnormal) Collected: 07/13/23 2110    Specimen: Blood Updated: 07/13/23 2111     Glucose 160 mg/dL      Comment: Serial Number: [id number]:  [id number]       Basic Metabolic Panel [[id number]]  (Abnormal) Collected: 07/13/23 2006    Specimen: Blood Updated: 07/13/23 2029     Glucose 167 mg/dL      BUN 19 mg/dL      Creatinine 0.73 mg/dL      Sodium 148 mmol/L      Potassium 3.9 mmol/L      Chloride 115 mmol/L      CO2 17.0 mmol/L      Calcium 9.1 mg/dL      BUN/Creatinine Ratio 26.0     Anion Gap 16.0 mmol/L      eGFR 79.7 mL/min/1.73     Narrative:      GFR Normal >60  Chronic Kidney Disease <60  Kidney Failure <15    The GFR formula is only valid for adults with stable renal function between ages 18 and 70.    Urine Culture - Urine, Urine, Clean Catch [[id number]]  (Normal) Collected: 07/12/23 1646    Specimen: Urine, Clean Catch Updated: 07/13/23 2023     Urine Culture No growth    CBC (No Diff) [[id number]]  (Abnormal) Collected: 07/13/23 2006    Specimen: Blood Updated: 07/13/23 2011     WBC 13.90 10*3/mm3      RBC 3.60 10*6/mm3      Hemoglobin 10.2 g/dL      Hematocrit 31.9 %      MCV 88.5 fL      MCH 28.4 pg      MCHC 32.1 g/dL      RDW 14.6 %      RDW-SD 47.7 fl      MPV 8.2 fL      Platelets 148 10*3/mm3     Blood Gas, Mixed [[id number]]  (Abnormal) Collected: 07/13/23  1944    Specimen: Blood Updated: 07/13/23 1952     pH, mixed 7.34 pH units      PCO2 MIXED 37.2 mmHg      PO2 MIXED 41.7 mmHg      HCO3 MIXED 20.1 mmol/L      CO2 Content 21.3 mmol/L      Base Excess, Arterial -5.1 mmol/L      Comment: Serial Number: [id number]:  [id number]        O2 SATURATION MIXED 74.6 %      Hemodilution No     Site Swanz Anthony     Arnold's Test N/A     Modality Adult Vent     FIO2 40 %      [facility] 12     Rate 30 Breaths/minute      PEEP 5     Base Deficit -5.6 mEq/liter      Ventilator Mode ;AC    Lactic Acid, Plasma [[id number]]  (Normal) Collected: 07/13/23 1929    Specimen: Blood Updated: 07/13/23 1950     Lactate 1.8 mmol/L     POC Glucose Once [[id number]]  (Abnormal) Collected: 07/13/23 1914    Specimen: Blood Updated: 07/13/23 1916     Glucose 146 mg/dL      Comment: Serial Number: [id number]:  [id number]       POC Glucose Once [[id number]]  (Abnormal) Collected: 07/13/23 1745    Specimen: Blood Updated: 07/13/23 1748     Glucose 150 mg/dL      Comment: Serial Number: [id number]:  [id number]       POCT Electrolytes +HGB +HCT [[id number]]  (Abnormal) Collected: 07/13/23 1745    Specimen: Blood Updated: 07/13/23 1748     Sodium 147 mmol/L      POC Potassium 4.0 mmol/L      Ionized Calcium 1.32 mmol/L      Comment: Serial Number: [id number]:  [id number]        Glucose 150 mg/dL      Hematocrit 30 %      Hemoglobin 10.1 g/dL     Blood Gas, Arterial - [[id number]]  (Abnormal) Collected: 07/13/23 1745    Specimen: Arterial Blood Updated: 07/13/23 1748     Site Arterial Line     Arnold's Test N/A     pH, Arterial 7.513 pH units      pCO2, Arterial 26.7 mm Hg      pO2, Arterial 122.1 mm Hg      HCO3, Arterial 21.4 mmol/L      Base Excess, Arterial -0.8 mmol/L      Comment: Serial Number: [id number]:  [id number]        O2 Saturation, Arterial 99.2 %      CO2 Content 22.2 mmol/L      Barometric Pressure for Blood Gas --     Comment: N/A        Modality Adult Vent     FIO2 40 %       Ventilator Mode ;AC     Set Tidal Volume 500     PEEP 5     Hemodilution No     Respiratory Rate 12    POC Activated Clotting Time [[id number]]  (Normal) Collected: 07/13/23 1102    Specimen: Arterial Blood Updated: 07/13/23 1730     Activated Clotting Time  125 Seconds      Comment: Serial Number: [id number]:  [id number]       POC Chem 8, arterial (ISTAT) [[id number]]  (Abnormal) Collected: 07/13/23 1103    Specimen: Arterial Blood Updated: 07/13/23 1729     Ionized Calcium 1.00 mmol/L      pH, Arterial 7.240 pH units      pCO2, Arterial 37.2 mm Hg      pO2, Arterial 262.0 mm Hg      HCO3, Arterial 15.8 mmol/L      Base Excess, Arterial <0.0 mmol/L      Base Deficit --     O2 Saturation, Arterial 100.0 %      Glucose 214 mg/dL      Comment: Serial Number: [id number]:  [id number]        Sodium 143 mmol/L      POC Potassium 3.4 mmol/L      Hematocrit 24 %      Hemoglobin 8.2 g/dL      CO2 Content 17 mmol/L     POC Chem 8, arterial (ISTAT) [[id number]]  (Abnormal) Collected: 07/13/23 1032    Specimen: Arterial Blood Updated: 07/13/23 1729     Ionized Calcium 1.01 mmol/L      pH, Arterial 7.260 pH units      pCO2, Arterial 41.8 mm Hg      pO2, Arterial 295.0 mm Hg      HCO3, Arterial 18.6 mmol/L      Base Excess, Arterial <0.0 mmol/L      Base Deficit --     O2 Saturation, Arterial 100.0 %      Glucose 185 mg/dL      Comment: Serial Number: [id number]:  [id number]        Sodium 142 mmol/L      POC Potassium 3.9 mmol/L      Hematocrit 26 %      Hemoglobin 8.8 g/dL      CO2 Content 20 mmol/L     POC Activated Clotting Time [[id number]]  (Abnormal) Collected: 07/13/23 1032    Specimen: Arterial Blood Updated: 07/13/23 1729     Activated Clotting Time  299 Seconds      Comment: Serial Number: [id number]:  [id number]       POC Activated Clotting Time [[id number]]  (Abnormal) Collected: 07/13/23 0954    Specimen: Arterial Blood Updated: 07/13/23 1728     Activated Clotting Time  347 Seconds      Comment: Serial Number:  [id number]:  [id number]       POC Activated Clotting Time [[id number]]  (Abnormal) Collected: 07/13/23 0920    Specimen: Arterial Blood Updated: 07/13/23 1728     Activated Clotting Time  377 Seconds      Comment: Serial Number: [id number]:  [id number]       POC Activated Clotting Time [[id number]]  (Normal) Collected: 07/13/23 0813    Specimen: Arterial Blood Updated: 07/13/23 1728     Activated Clotting Time  131 Seconds      Comment: Serial Number: [id number]:  [id number]       POC Chem 8, arterial (ISTAT) [[id number]]  (Abnormal) Collected: 07/13/23 0955    Specimen: Arterial Blood Updated: 07/13/23 1727     Ionized Calcium 1.05 mmol/L      pH, Arterial 7.240 pH units      pCO2, Arterial 45.2 mm Hg      pO2, Arterial 334.0 mm Hg      HCO3, Arterial 19.5 mmol/L      Base Excess, Arterial <0.0 mmol/L      Base Deficit --     O2 Saturation, Arterial 100.0 %      Glucose 164 mg/dL      Comment: Serial Number: [id number]:  [id number]        Sodium 142 mmol/L      POC Potassium 3.9 mmol/L      Hematocrit 27 %      Hemoglobin 9.2 g/dL      CO2 Content 21 mmol/L     POC Chem 8, arterial (ISTAT) [[id number]]  (Abnormal) Collected: 07/13/23 0920    Specimen: Arterial Blood Updated: 07/13/23 1727     Ionized Calcium 1.06 mmol/L      pH, Arterial 7.250 pH units      pCO2, Arterial 46.6 mm Hg      pO2, Arterial 327.0 mm Hg      HCO3, Arterial 20.5 mmol/L      Base Excess, Arterial <0.0 mmol/L      Base Deficit --     O2 Saturation, Arterial 100.0 %      Glucose 121 mg/dL      Comment: Serial Number: [id number]:  [id number]        Sodium 143 mmol/L      POC Potassium 3.2 mmol/L      Hematocrit 24 %      Hemoglobin 8.2 g/dL      CO2 Content 22 mmol/L     POC Chem 8, arterial (ISTAT) [[id number]]  (Abnormal) Collected: 07/13/23 0814    Specimen: Arterial Blood Updated: 07/13/23 1727     Ionized Calcium 1.16 mmol/L      pH, Arterial 7.350 pH units      pCO2, Arterial 44.5 mm Hg      pO2, Arterial 529.0 mm Hg      HCO3, Arterial 24.6  mmol/L      Base Excess, Arterial <0.0 mmol/L      Base Deficit --     O2 Saturation, Arterial 100.0 %      Glucose 117 mg/dL      Comment: Serial Number: [id number]:  [id number]        Sodium 141 mmol/L      POC Potassium 3.0 mmol/L      Hematocrit 30 %      Hemoglobin 10.2 g/dL      CO2 Content 26 mmol/L     POC Glucose Once [[id number]]  (Abnormal) Collected: 07/13/23 1712    Specimen: Blood Updated: 07/13/23 1716     Glucose 143 mg/dL      Comment: Serial Number: [id number]:  [id number]       POCT Electrolytes +HGB +HCT [[id number]]  (Abnormal) Collected: 07/13/23 1712    Specimen: Blood Updated: 07/13/23 1716     Sodium 146 mmol/L      POC Potassium 4.0 mmol/L      Ionized Calcium 1.41 mmol/L      Comment: Serial Number: [id number]:  [id number]        Glucose 143 mg/dL      Hematocrit 30 %      Hemoglobin 10.2 g/dL     Blood Gas, Arterial - [[id number]]  (Abnormal) Collected: 07/13/23 1712    Specimen: Arterial Blood Updated: 07/13/23 1716     Site Arterial Line     Arnold's Test N/A     pH, Arterial 7.400 pH units      pCO2, Arterial 31.2 mm Hg      pO2, Arterial 125.9 mm Hg      HCO3, Arterial 19.3 mmol/L      Base Excess, Arterial -4.7 mmol/L      Comment: Serial Number: [id number]:  [id number]        O2 Saturation, Arterial 98.9 %      CO2 Content 20.3 mmol/L      Barometric Pressure for Blood Gas --     Comment: N/A        Modality Adult Vent     FIO2 40 %      Ventilator Mode ;AC     Set Tidal Volume 500     PEEP 5     Hemodilution No     Respiratory Rate 12    Magnesium [[id number]]  (Abnormal) Collected: 07/13/23 1559    Specimen: Blood Updated: 07/13/23 1632     Magnesium 3.0 mg/dL     Renal Function Panel [[id number]]  (Abnormal) Collected: 07/13/23 1559    Specimen: Blood Updated: 07/13/23 1631     Glucose 144 mg/dL      BUN 17 mg/dL      Creatinine 0.71 mg/dL      Sodium 148 mmol/L      Potassium 4.4 mmol/L      Chloride 115 mmol/L      CO2 19.0 mmol/L      Calcium 8.8 mg/dL      Albumin 4.0 g/dL       Phosphorus 2.9 mg/dL      Anion Gap 14.0 mmol/L      BUN/Creatinine Ratio 23.9     eGFR 82.4 mL/min/1.73     Narrative:      GFR Normal >60  Chronic Kidney Disease <60  Kidney Failure <15    The GFR formula is only valid for adults with stable renal function between ages 18 and 70.    POC Glucose Once [[id number]]  (Abnormal) Collected: 07/13/23 1613    Specimen: Blood Updated: 07/13/23 1616     Glucose 137 mg/dL      Comment: Serial Number: [id number]:  [id number]       POCT Electrolytes +HGB +HCT [[id number]]  (Abnormal) Collected: 07/13/23 1613    Specimen: Blood Updated: 07/13/23 1616     Sodium 147 mmol/L      POC Potassium 4.1 mmol/L      Ionized Calcium 1.26 mmol/L      Comment: Serial Number: [id number]:  [id number]        Glucose 137 mg/dL      Hematocrit 29 %      Hemoglobin 9.9 g/dL     Blood Gas, Arterial - [[id number]]  (Abnormal) Collected: 07/13/23 1613    Specimen: Arterial Blood Updated: 07/13/23 1616     Site Arterial Line     Arnold's Test N/A     pH, Arterial 7.453 pH units      pCO2, Arterial 28.9 mm Hg      pO2, Arterial 120.7 mm Hg      HCO3, Arterial 20.2 mmol/L      Base Excess, Arterial -2.9 mmol/L      Comment: Serial Number: [id number]:  [id number]        O2 Saturation, Arterial 99.0 %      CO2 Content 21.1 mmol/L      Barometric Pressure for Blood Gas --     Comment: N/A        Modality Adult Vent     FIO2 40 %      Ventilator Mode ;AC     Set Tidal Volume 550     PEEP 5     Hemodilution No     Respiratory Rate 12    CBC (No Diff) [[id number]]  (Abnormal) Collected: 07/13/23 1559    Specimen: Blood Updated: 07/13/23 1610     WBC 12.80 10*3/mm3      RBC 3.47 10*6/mm3      Hemoglobin 10.2 g/dL      Hematocrit 31.2 %      MCV 90.1 fL      MCH 29.5 pg      MCHC 32.8 g/dL      RDW 14.4 %      RDW-SD 45.1 fl      MPV 8.0 fL      Platelets 138 10*3/mm3     POC Glucose Once [[id number]]  (Abnormal) Collected: 07/13/23 1515    Specimen: Blood Updated: 07/13/23 1518     Glucose 128 mg/dL      Comment:  Serial Number: [id number]:  [id number]       POCT Electrolytes +HGB +HCT [[id number]]  (Abnormal) Collected: 07/13/23 1515    Specimen: Blood Updated: 07/13/23 1518     Sodium 144 mmol/L      POC Potassium 4.6 mmol/L      Ionized Calcium 1.36 mmol/L      Comment: Serial Number: [id number]:  [id number]        Glucose 128 mg/dL      Hematocrit 30 %      Hemoglobin 10.3 g/dL     Blood Gas, Arterial - [[id number]]  (Abnormal) Collected: 07/13/23 1515    Specimen: Arterial Blood Updated: 07/13/23 1518     Site Arterial Line     Arnold's Test N/A     pH, Arterial 7.314 pH units      pCO2, Arterial 35.8 mm Hg      pO2, Arterial 118.6 mm Hg      HCO3, Arterial 18.2 mmol/L      Base Excess, Arterial -7.3 mmol/L      Comment: Serial Number: [id number]:  [id number]        O2 Saturation, Arterial 98.3 %      CO2 Content 19.3 mmol/L      Barometric Pressure for Blood Gas --     Comment: N/A        Modality Adult Vent     FIO2 40 %      Ventilator Mode ;AC     Set Tidal Volume 550     PEEP 5     Hemodilution No     Respiratory Rate 12    Lactic Acid, Plasma [[id number]]  (Abnormal) Collected: 07/13/23 1343    Specimen: Blood Updated: 07/13/23 1422     Lactate 2.4 mmol/L     POC Glucose Once [[id number]]  (Abnormal) Collected: 07/13/23 1416    Specimen: Blood Updated: 07/13/23 1421     Glucose 124 mg/dL      Comment: Serial Number: [id number]:  [id number]       POCT Electrolytes +HGB +HCT [[id number]]  (Abnormal) Collected: 07/13/23 1416    Specimen: Blood Updated: 07/13/23 1421     Sodium 144 mmol/L      POC Potassium 4.1 mmol/L      Ionized Calcium 1.29 mmol/L      Comment: Serial Number: [id number]:  [id number]        Glucose 124 mg/dL      Hematocrit 31 %      Hemoglobin 10.5 g/dL     Blood Gas, Arterial - [[id number]]  (Abnormal) Collected: 07/13/23 1416    Specimen: Arterial Blood Updated: 07/13/23 1421     Site Arterial Line     Arnold's Test N/A     pH, Arterial 7.407 pH units      pCO2, Arterial 29.8 mm Hg      pO2, Arterial 130.2 mm  Hg      HCO3, Arterial 18.7 mmol/L      Base Excess, Arterial -5.1 mmol/L      Comment: Serial Number: [id number]:  [id number]        O2 Saturation, Arterial 99.0 %      CO2 Content 19.6 mmol/L      Barometric Pressure for Blood Gas --     Comment: N/A        Modality Adult Vent     FIO2 40 %      Ventilator Mode ;AC     Set Tidal Volume 600     PEEP 5     Hemodilution No     Respiratory Rate 14    Blood Gas, Mixed [[id number]]  (Abnormal) Collected: 07/13/23 1343    Specimen: Blood Updated: 07/13/23 1346     pH, mixed 7.34 pH units      PCO2 MIXED 37.1 mmHg      PO2 MIXED 36.9 mmHg      HCO3 MIXED 20.1 mmol/L      CO2 Content 21.2 mmol/L      Base Excess, Arterial -5.2 mmol/L      Comment: Serial Number: [id number]  [id number]        O2 SATURATION MIXED 67.5 %      Hemodilution No     Site [facility]     Arnold's Test N/A     Modality Adult Vent     FIO2 40 %      Set Mech Resp Rate 14     PEEP 5     Base Deficit -5.7 mEq/liter      Ventilator Mode ;AC    Renal Function Panel [[id number]]  (Abnormal) Collected: 07/13/23 1228    Specimen: Blood Updated: 07/13/23 1317     Glucose 137 mg/dL      BUN 17 mg/dL      Creatinine 0.76 mg/dL      Sodium 144 mmol/L      Potassium 3.2 mmol/L      Chloride 116 mmol/L      CO2 17.0 mmol/L      Calcium 8.8 mg/dL      Albumin 3.3 g/dL      Phosphorus 3.3 mg/dL      Anion Gap 11.0 mmol/L      BUN/Creatinine Ratio 22.4     eGFR 75.9 mL/min/1.73     Narrative:      GFR Normal >60  Chronic Kidney Disease <60  Kidney Failure <15    The GFR formula is only valid for adults with stable renal function between ages 18 and 70.    Magnesium [[id number]]  (Abnormal) Collected: 07/13/23 1228    Specimen: Blood Updated: 07/13/23 1317     Magnesium 1.4 mg/dL     POC Glucose Once [[id number]]  (Abnormal) Collected: 07/13/23 1309    Specimen: Blood Updated: 07/13/23 1313     Glucose 125 mg/dL      Comment: Serial Number: [id number]:  [id number]       POCT Electrolytes +HGB +HCT [[id number]]  (Abnormal)  Collected: 07/13/23 1309    Specimen: Blood Updated: 07/13/23 1313     Sodium 145 mmol/L      POC Potassium 3.1 mmol/L      Ionized Calcium 1.32 mmol/L      Comment: Serial Number: [id number]  [id number]        Glucose 125 mg/dL      Hematocrit 30 %      Hemoglobin 10.1 g/dL     Blood Gas, Arterial - [[id number]]  (Abnormal) Collected: 07/13/23 1309    Specimen: Arterial Blood Updated: 07/13/23 1313     Site Arterial Line     Arnold's Test N/A     pH, Arterial 7.377 pH units      pCO2, Arterial 31.9 mm Hg      pO2, Arterial 272.5 mm Hg      HCO3, Arterial 18.7 mmol/L      Base Excess, Arterial -5.7 mmol/L      Comment: Serial Number: [id number]:  [id number]        O2 Saturation, Arterial 99.9 %      CO2 Content 19.7 mmol/L      Barometric Pressure for Blood Gas --     Comment: N/A        Modality Adult Vent     FIO2 70 %      Ventilator Mode ;AC     Set Tidal Volume 650     PEEP 8     Hemodilution No     Respiratory Rate 14    Calcium, Ionized [[id number]]  (Abnormal) Collected: 07/13/23 1228    Specimen: Blood Updated: 07/13/23 1306     Ionized Calcium 1.31 mmol/L     aPTT [[id number]]  (Abnormal) Collected: 07/13/23 1228    Specimen: Blood Updated: 07/13/23 1305     PTT 40.0 seconds     Protime-INR [[id number]]  (Abnormal) Collected: 07/13/23 1228    Specimen: Blood Updated: 07/13/23 1305     Protime 13.0 Seconds      INR 1.23    Fibrinogen [[id number]]  (Abnormal) Collected: 07/13/23 1228    Specimen: Blood Updated: 07/13/23 1305     Fibrinogen 188 mg/dL     CBC & Differential [[id number]]  (Abnormal) Collected: 07/13/23 1228    Specimen: Blood Updated: 07/13/23 1256    Narrative:      The following orders were created for panel order CBC & Differential.  Procedure                               Abnormality         Status                     ---------                               -----------         ------                     CBC Auto Differential[[id number]]        Abnormal            Final result                 Please  view results for these tests on the individual orders.    CBC Auto Differential [[id number]]  (Abnormal) Collected: 07/13/23 1228    Specimen: Blood Updated: 07/13/23 1256     WBC 13.30 10*3/mm3      RBC 3.63 10*6/mm3      Hemoglobin 10.6 g/dL      Comment: Result checked          Hematocrit 32.1 %      MCV 88.5 fL      MCH 29.2 pg      MCHC 33.0 g/dL      RDW 14.0 %      RDW-SD 45.9 fl      MPV 7.7 fL      Platelets 138 10*3/mm3      Neutrophil % 88.1 %      Lymphocyte % 6.3 %      Monocyte % 4.7 %      Eosinophil % 0.7 %      Basophil % 0.2 %      Neutrophils, Absolute 11.70 10*3/mm3      Lymphocytes, Absolute 0.80 10*3/mm3      Monocytes, Absolute 0.60 10*3/mm3      Eosinophils, Absolute 0.10 10*3/mm3      Basophils, Absolute 0.00 10*3/mm3      nRBC 0.2 /100 WBC     aPTT [[id number]]  (Abnormal) Collected: 07/13/23 1121    Specimen: Blood, Arterial Line Updated: 07/13/23 1155     PTT 34.6 seconds     Protime-INR [[id number]]  (Abnormal) Collected: 07/13/23 1121    Specimen: Blood, Arterial Line Updated: 07/13/23 1155     Protime 14.7 Seconds      INR 1.40    Fibrinogen [[id number]]  (Abnormal) Collected: 07/13/23 1121    Specimen: Blood, Arterial Line Updated: 07/13/23 1155     Fibrinogen 167 mg/dL     CBC (No Diff) [[id number]]  (Abnormal) Collected: 07/13/23 1121    Specimen: Blood, Arterial Line Updated: 07/13/23 1145     WBC 12.40 10*3/mm3      RBC 3.01 10*6/mm3      Hemoglobin 8.6 g/dL      Hematocrit 27.1 %      MCV 90.3 fL      MCH 28.5 pg      MCHC 31.5 g/dL      RDW 14.2 %      RDW-SD 47.3 fl      MPV 8.1 fL      Platelets 67 10*3/mm3     Platelet Function ADP [[id number]]  (Normal) Collected: 07/13/23 1121    Specimen: Blood, Arterial Line Updated: 07/13/23 1140     ADP Aggregation, % Platelet 96 %             Imaging Results (Last 24 Hours)       Procedure Component Value Units Date/Time    XR Chest 1 View [[id number]] Resulted: 07/14/23 0244     Updated: 07/14/23 0245    XR Chest 1 View [[id number]]  Collected: 07/13/23 1349     Updated: 07/13/23 1356    Narrative:      XR CHEST 1 VW    Date of Exam: 7/13/2023 1:09 PM EDT    Indication: Post-Op Check Line & Tube Placement    Comparison: Preoperative AP chest x-ray 7/11/2023    Findings:  There is been interval postoperative changes from CABG. Tip of the endotracheal tube terminates in the mid to lower thoracic trachea approximately 3 cm above the brian. Tip of the enteric tube is not included, but courses below the level of the   diaphragm. Tip of the Wiley Ford-Anthony catheter terminates in the pulmonary outflow tract. There are bilateral chest tubes in place.    Oblique line over the right upper chest is likely associated with the external pad. No definite pneumothorax is seen on this semierect exam. There are mild bibasilar airspace opacities.      Impression:      Impression:  1.Interval postoperative changes with positioning of support tubes and lines, as above.  2.No definite pneumothorax on this semierect exam.  3.Mild bibasilar airspace opacities, likely due to atelectasis.      Electronically Signed: Vy Sellers    7/13/2023 1:54 PM EDT    Workstation ID: [id number]        LAB RESULTS (LAST 7 DAYS)    CBC  Results from last 7 days   Lab Units 07/14/23  0327 07/14/23  0318 07/13/23 2006 07/13/23  1745 07/13/23  1712 07/13/23  1613 07/13/23  1559 07/13/23  1309 07/13/23  1228 07/13/23  1121 07/13/23  0814 07/13/23  0020 07/12/23  1031   WBC 10*3/mm3 18.40*  --  13.90*  --   --   --  12.80*  --  13.30* 12.40*  --  7.30 8.30   RBC 10*6/mm3 4.00  --  3.60*  --   --   --  3.47*  --  3.63* 3.01*  --  4.12 4.38   HEMOGLOBIN g/dL 11.7*  --  10.2*  --   --   --  10.2*  --  10.6* 8.6*  --  12.3 13.2   HEMOGLOBIN, POC g/dL  --  11.5*  --  10.1* 10.2* 9.9*  --    < >  --   --    < >  --   --    HEMATOCRIT % 36.1  --  31.9*  --   --   --  31.2*  --  32.1* 27.1*  --  37.8 39.7   HEMATOCRIT POC %  --  34*  --  30* 30* 29*  --    < >  --   --    < >  --   --    MCV fL 90.2   --  88.5  --   --   --  90.1  --  88.5 90.3  --  91.9 90.7   PLATELETS 10*3/mm3 177  --  148  --   --   --  138*  --  138* 67*  --  187 195    < > = values in this interval not displayed.         BMP  Results from last 7 days   Lab Units 07/14/23 0327 07/13/23 2342 07/13/23 2006 07/13/23 1559 07/13/23 1228 07/13/23 0020 07/12/23  1031 07/12/23  0417 07/11/23  1918 07/11/23  0422   SODIUM mmol/L 148* 148* 148* 148* 144 140 137  --   --  140   POTASSIUM mmol/L 4.0 3.2* 3.9 4.4 3.2* 4.0 3.6  --   --  4.3   CHLORIDE mmol/L 117*  --  115* 115* 116* 106 103  --   --  106   CO2 mmol/L 17.0*  --  17.0* 19.0* 17.0* 25.0 23.0  --   --  22.0   BUN mg/dL 18  --  19 17 17 19 15  --   --  32*   CREATININE mg/dL 0.91  --  0.73 0.71 0.76 0.79 0.69  --   --  0.80   GLUCOSE mg/dL 169*  --  167* 144* 137* 118* 230*  --   --  133*   MAGNESIUM mg/dL 2.3 2.2  --  3.0* 1.4*  --  1.8 2.0 1.9  --    PHOSPHORUS mg/dL 3.2  --   --  2.9 3.3  --   --   --   --   --          CMP   Results from last 7 days   Lab Units 07/14/23 0327 07/13/23 2342 07/13/23 2006 07/13/23 1559 07/13/23 1228 07/13/23 0020 07/12/23  1031 07/11/23  0422 07/11/23  0128   SODIUM mmol/L 148* 148* 148* 148* 144 140 137 140 140   POTASSIUM mmol/L 4.0 3.2* 3.9 4.4 3.2* 4.0 3.6 4.3 4.2   CHLORIDE mmol/L 117*  --  115* 115* 116* 106 103 106 106   CO2 mmol/L 17.0*  --  17.0* 19.0* 17.0* 25.0 23.0 22.0 22.0   BUN mg/dL 18  --  19 17 17 19 15 32* 36*   CREATININE mg/dL 0.91  --  0.73 0.71 0.76 0.79 0.69 0.80 0.86   GLUCOSE mg/dL 169*  --  167* 144* 137* 118* 230* 133* 152*   ALBUMIN g/dL 4.0  --   --  4.0 3.3*  --   --   --  3.4*   BILIRUBIN mg/dL  --   --   --   --   --   --   --   --  0.2   ALK PHOS U/L  --   --   --   --   --   --   --   --  66   AST (SGOT) U/L  --   --   --   --   --   --   --   --  23   ALT (SGPT) U/L  --   --   --   --   --   --   --   --  11           BNP        TROPONIN  Results from last 7 days   Lab Units 07/11/23  2899   HSTROP T ng/L 285*          CoAg  Results from last 7 days   Lab Units 07/14/23  0327 07/13/23  1228 07/13/23  1121 07/13/23  0020 07/12/23  1820 07/12/23  1031 07/12/23  0417 07/11/23  1918 07/11/23  0959 07/11/23  0128   INR  1.19* 1.23* 1.40*  --   --   --  1.00  --   --  0.97   APTT seconds  --  40.0* 34.6* 81.6* 69.3 54.7* 31.6* 27.9*   < > 24.2*    < > = values in this interval not displayed.         Creatinine Clearance  Estimated Creatinine Clearance: 37.3 mL/min (by C-G formula based on SCr of 0.91 mg/dL).    ABG  Results from last 7 days   Lab Units 07/14/23  0323 07/14/23  0318 07/13/23  1944 07/13/23  1745 07/13/23  1712 07/13/23  1613 07/13/23  1515 07/13/23  1416 07/13/23  1343 07/13/23  1309   PH, ARTERIAL pH units  --  7.330*  --  7.513* 7.400 7.453* 7.314* 7.407  --  7.377   PCO2, ARTERIAL mm Hg  --  34.8*  --  26.7* 31.2* 28.9* 35.8 29.8*  --  31.9*   PO2 ART mm Hg  --  118.6*  --  122.1* 125.9* 120.7* 118.6* 130.2*  --  272.5*   O2 SATURATION ART %  --  98.4*  --  99.2* 98.9* 99.0* 98.3* 99.0*  --  99.9*   BASE EXCESS ART mmol/L -6.3* -6.8* -5.1* -0.8* -4.7* -2.9* -7.3* -5.1*   < > -5.7*    < > = values in this interval not displayed.         Radiology  XR Chest 1 View    Result Date: 7/13/2023  Impression: 1.Interval postoperative changes with positioning of support tubes and lines, as above. 2.No definite pneumothorax on this semierect exam. 3.Mild bibasilar airspace opacities, likely due to atelectasis. Electronically Signed: Vy Sellers  7/13/2023 1:54 PM EDT  Workstation ID: [id number]         EKG      I personally viewed and interpreted the patient's EKG/Telemetry data:    ECHOCARDIOGRAM:    Results for orders placed during the hospital encounter of 07/11/23    Adult Transthoracic Echo Complete W/ Cont if Necessary Per Protocol    Interpretation Summary    Left ventricular ejection fraction appears to be 31 - 35%.    Estimated right ventricular systolic pressure from tricuspid regurgitation is mildly elevated  (35-45 mmHg).    Indications  Unstable angina  Non-STEMI    Technically satisfactory study.  Mitral valve is structurally normal.  Mild mitral regurgitation is present.  Tricuspid valve is structurally normal.  Aortic valve is thickened with adequate opening motion.  Pulmonic valve could not be well visualized.  No evidence for tricuspid or aortic regurgitation is seen by Doppler study.  Left atrium is enlarged.  Right atrium is normal in size.  Left ventricle is enlarged with apical and severe periapical hypokinesis with ejection fraction of 30 to 35%.  Right ventricle is normal in size.  Atrial septum is intact.  Aorta is normal.  No pericardial effusion or intracardiac thrombus is seen.    Impression  Mild mitral regurgitation.  Aortic valve is thickened with adequate opening motion.  Left ventricle is enlarged with apical and severe periapical hypokinesis with ejection fraction of 30 to 35%.          STRESS TEST        Cardiolite (Tc-99m sestamibi) stress test    CARDIAC CATHETERIZATION  Results for orders placed during the hospital encounter of 07/11/23    Cardiac Catheterization/Vascular Study    Narrative  CARDIAC CATHETERIZATION REPORT    DATE OF PROCEDURE:  7/11/2023      INDICATION FOR PROCEDURE:  Non-STEMI  Unstable angina  Status post stent      PROCEDURE PERFORMED:    Left heart catheterization, coronary angiography, left ventriculography    @@  Moderate conscious sedation was utilized with intravenous Versed and fentanyl administered by registered nurse with continuous ECG, pulse oximetry and hemodynamic monitoring supervised by myself throughout the entire procedure.  Conscious sedation time   30 minutes    I was present with the patient for the duration of moderate sedation and supervised staff who had no other duties and monitored the patient for the entire procedure.    @@  PROCEDURE COMMENTS:  Under usual sterile precautions and 1% lidocaine infiltration right femoral artery was percutaneously  punctured and a 5 Slovak vascular sheath was introduced into the right femoral artery.  Left and right coronary arteriography was performed in varying degrees of obliquity followed by left ventricular angiogram.  Hemostasis was obtained and patient was returned to the room with intact pulses.  No complications were noted.      FINDINGS:    1. HEMODYNAMICS:  Left ventricle end-diastolic pressure is normal.  Gradient of 20 mmHg was noted.    2. LEFT VENTRICULOGRAPHY:  Left ventricular enlargement with apical and periapical severe hypokinesis with ejection fraction of 30 to 35%.  2+ mitral regurgitation is present.      3. CORONARY ANGIOGRAPHY:  Left main coronary artery has distal 50% disease.  Left anterior descending artery has ostial 99% disease.  Mid segment has 50 to 60% disease.  Diagonal branch is a small caliber vessel that has 50 to 60% disease.  Circumflex coronary artery is a large vessel but nondominant and has proximal 95% disease.  Right coronary artery is a large and dominant vessel and has ostial 99% disease and diffuse 50% disease in the mid and distal segments.  Left ventricular branch and PDA has 70 to 80% disease.      SUMMARY:    Left main coronary artery has distal 50% disease.  Left anterior descending artery has ostial 99% disease.  Mid segment has 50 to 60% disease.  Diagonal branch is a small caliber vessel that has 50 to 60% disease.  Circumflex coronary artery is a large vessel but nondominant and has proximal 95% disease.  Right coronary artery is a large and dominant vessel and has ostial 99% disease and diffuse 50% disease in the mid and distal segments.  Left ventricular branch and PDA has 70 to 80% disease.    RECOMMENDATIONS:  Patient has severe and critical triple-vessel coronary artery disease.  Patient has left ventricular dysfunction likely due to recent stenting effect from probably transient total occlusion of left anterior descending artery.  Patient would benefit from  CABG.  Cardiovascular surgery consultation initiated.  Have discussed with Dr. Hylton.  Restart intravenous heparin after sheaths are pulled and patient is stable without any bleeding.  Have discussed with patient's family daughter and granddaughter.                OTHER:         Assessment & Plan     Principal Problem:    NSTEMI (non-ST elevated myocardial infarction)  Active Problems:    Unstable angina    Status post angioplasty with stent    Coronary artery disease involving native coronary artery of native heart with unstable angina pectoris    ]]]]]]]]]]]]]]]]]]]]]]  Impression  =============  Status post CABG x 3 off pump (LIMA to LAD, SVG to OM, SVG to PDA)-Dr. Galindo 7/13/2023    -Unstable angina/non-STEMI-prior to surgery    - Severe and critical coronary artery disease.  Cardiac cath 7/11/2023 revealed    Cardiac catheterization 7/11/2023 revealed     Left main coronary artery has distal 50% disease.  Left anterior descending artery has ostial 99% disease.  Mid segment has 50 to 60% disease.  Diagonal branch is a small caliber vessel that has 50 to 60% disease.  Circumflex coronary artery is a large vessel but nondominant and has proximal 95% disease.  Right coronary artery is a large and dominant vessel and has ostial 99% disease and diffuse 50% disease in the mid and distal segments.  Left ventricular branch and PDA has 70 to 80% disease.    - Ischemic cardiomyopathy with ejection fraction of 30 to 35%-likely due to stunning effect.    - Status post stent to LAD 2011 at [facility]-Dr. Moreno.    - Hypertension    - No known allergies  ==============  Plan  ===========  Status post CABG x 3 off pump (LIMA to LAD, SVG to OM, SVG to PDA)-Dr. Galindo-7/13/2023.  2. EVH of the right legs    Patient presented with unstable angina pectoris.  Non-STEMI  High-sensitivity troponin 179, repeat 285    Patient had cardiac catheterization 7/11/2023 that revealed severe and critical triple-vessel  coronary artery disease.  Patient has severe and critical triple-vessel coronary artery disease.    Patient has left ventricular dysfunction likely due to recent stenting effect from probably transient total occlusion of left anterior descending artery.  Hopefully left ventricle function would improve with revascularization.    Echocardiogram 7/14/2023 revealed significant diffuse hypocontractility with ejection fraction of 15%.    Patient is on multiple pressors.    Rhythm-patient is atrial paced.  90/min (Hemodynamics)    Respiratory status.  Patient is intubated and is on the respirator.  Intensivist is following the patient.    Renal function  BUNs/creatinine 18/0.91-7/14/2023.    Hopefully left ventricular function would improve with recent revascularization.    Patient's overall condition is critical but stable at this time although concerned about overall condition.    Further plan will depend on patient's progress.  ]]]]]]]]]]]]]]]]]]]]             Yoana Vizcarra MD  07/14/23  06:43 EDT

## 2023-07-15 NOTE — PROCEDURES
Intubation    Date/Time: 7/14/2023 11:20 PM  Performed by: Elsi Hernandez APRN  Authorized by: Elsi Hernandez APRN   Consent: The procedure was performed in an emergent situation.  Pretreatment medications: none  Laryngoscope size: Mac 3  Tube size: 7.0 mm  Tube type: cuffed  Number of attempts: 1  Ventilation between attempts: BVM  Cricoid pressure: no  Cords visualized: yes  Post-procedure assessment: chest rise and ETCO2 monitor  Breath sounds: equal  Cuff inflated: yes  ETT to lip: 22 cm  Tube secured with: ETT samayoa  Chest x-ray interpreted by: CXR pending at time of note.  Comments: Patient emergently intubated   Patient intubated using glide scope with 7.0 ETT, measuring 22 at the lip.  Correct color change on EtCO2 detector.  Bilateral breath sounds and chest rise.  No immediate complications noted.  No trauma to lips, tongue, or teeth noted.

## 2023-07-15 NOTE — DISCHARGE SUMMARY
Date of Admission:   Date of Discharge:  7/15/2023    Discharge Diagnosis: Multivessel Coronary Artery Disease - Cardiomyopathy     Presenting Problem/History of Present Illness  NSTEMI (non-ST elevated myocardial infarction) [I21.4]     Hospital Course  Ms. Fabian was a 87 year-old female with PMH of HTN and CAD with previous PCI in  who presented to the ED on 23 with a chief complaint of chest pain. She had a NSTEMI. Cardiac cath was performed revealing EF 30-35, and severe 3 vessel CAD with 50% left main disease. Transthoracic echocardiogram also revealed EF 31-35% and mild mitral regurgitation. She underwent CABG x3 on 23. On POD #1 she was extubated and on inotropic support due to LV dysfunction. Patient with significant decline overnight.  Required emergent reintubation with increasing vasopressor and inotropic support. Patient prognosis was poor, family request transition to comfort care and withdraw care. Patient  on 7/15/23 at 01:17am.    Procedures Performed  Procedure(s):  CORONARY ARTERY BYPASS WITH INTERNAL MAMMARY ARTERY GRAFT OFF PUMP  TRANSESOPHAGEAL ECHOCARDIOGRAM   9717 Intubation      Condition on Discharge:      Discharge Disposition       Elias Galindo MD  07/15/23  08:02 EDT

## 2023-07-15 NOTE — PROGRESS NOTES
Patient on PO of CABG. She was extubated this morning. Transthoracic echo this morning showed a LV with an EF of 15%. She was doing well, and she started to be hypotensive. She needed to be re intubated. We increase the inotropic support, we transfuse PRBC and resuscitate the patient. She is unresponsive to increased dose of vasopressors. Family at bedside. After two hours of resuscitation and increased doses of inotrope's and vasopressors the family decided to withdrawal care.

## 2023-07-15 NOTE — CASE MANAGEMENT/SOCIAL WORK
Case Management Discharge Note      Final Note:            Final Discharge Disposition Code: 42 -  - place unknown

## 2023-07-15 NOTE — SIGNIFICANT NOTE
Patient with significant decline overnight.  Required emergent intubation prior to losing pulse for approximately 30 seconds (see code documentation).  Dr. Galindo called by primary RN and at bedside.  Requested that this APRN talk with family regarding comfort care as patient's prognosis is poor.  Spoke with patient's daughter about her prognosis and they request to transition to comfort care and withdraw care.  This APRN will place comfort measures orders per Dr. Rodriguez request.  Orders placed.    Electronically signed by ARIES Arce, 07/15/23, 12:26 AM EDT.

## 2023-07-19 NOTE — PROGRESS NOTES
"Enter Query Response Below      Query Response: - No, re-intubated for airway protection and oxygenation support.              If applicable, please update the problem list.   Patient: Dary Fabian        : 1935  Account: [id number]           Admit Date: 2023        How to Respond to this query:       a. Click New Note     b. Answer query within the yellow box.                c. Update the Problem List, if applicable.      If you have any questions about this query contact me at: [phone].     :     Patient with a RA O2 sat of 98% on admission. S/p Cabg x 3 off pump on . Patient extubated on POD #1 to 3LNC. Oxygen sats ranged from 93-99% with RR of 15-21. She was re-intubated on  at 2230. ABG prior to re-intubation showed a PH of 7.08, PCO2 62, PO2 101, HCO3 18, sat 94% on 3LNC. There was no accessory muscle use, labored breathing, tachypnea, cyanosis or acute distress note.  \"Acute postoperative respiratory failure\" is documented on the  intubation procedure note. Per APRN note on 7/15 \"Required emergent intubation prior to losing pulse for approximately 30 seconds.\"     After study, was acute respiratory failure clinically supported this admission?    - Yes, please include additional clinical indicators to support____________.  - No, re-intubated for airway protection only.  - Other__________.  - Unable to determine.    By submitting this query, we are merely seeking further clarification of documentation to accurately reflect all conditions that you are monitoring, evaluating, treating or that extend the hospitalization or utilize additional resources of care. Please utilize your independent clinical judgment when addressing the question(s) above.     This query and your response, once completed, will be entered into the legal medical record.    Sincerely,  Mami Bojorquez RN  Clinical Documentation Integrity Program     "

## 2023-07-31 LAB — QT INTERVAL: 426 MS

## (undated) DEVICE — GW PTFE EMERALD HEPCOAT FC J TIP STD .035 3MM 150CM

## (undated) DEVICE — PK TRY HEART CATH 50

## (undated) DEVICE — ELECTRD DEFIB M/FUNC PROPADZ RADIOL 2PK

## (undated) DEVICE — PINNACLE INTRODUCER SHEATH: Brand: PINNACLE

## (undated) DEVICE — RADIFOCUS OBTURATOR: Brand: RADIFOCUS

## (undated) DEVICE — CATH DIAG IMPULSE PIG 5F 100CM

## (undated) DEVICE — CATH DIAG IMPULSE FR4 5F 100CM

## (undated) DEVICE — CATH DIAG IMPULSE FL4 5F 100CM